# Patient Record
Sex: FEMALE | Race: ASIAN | NOT HISPANIC OR LATINO | URBAN - METROPOLITAN AREA
[De-identification: names, ages, dates, MRNs, and addresses within clinical notes are randomized per-mention and may not be internally consistent; named-entity substitution may affect disease eponyms.]

---

## 2019-05-02 ENCOUNTER — INPATIENT (INPATIENT)
Facility: HOSPITAL | Age: 69
LOS: 1 days | Discharge: HOME | End: 2019-05-04
Attending: INTERNAL MEDICINE | Admitting: INTERNAL MEDICINE
Payer: MEDICAID

## 2019-05-02 VITALS
OXYGEN SATURATION: 98 % | RESPIRATION RATE: 18 BRPM | HEART RATE: 94 BPM | DIASTOLIC BLOOD PRESSURE: 84 MMHG | SYSTOLIC BLOOD PRESSURE: 163 MMHG | TEMPERATURE: 98 F

## 2019-05-02 DIAGNOSIS — I10 ESSENTIAL (PRIMARY) HYPERTENSION: ICD-10-CM

## 2019-05-02 DIAGNOSIS — E11.9 TYPE 2 DIABETES MELLITUS WITHOUT COMPLICATIONS: ICD-10-CM

## 2019-05-02 DIAGNOSIS — R11.2 NAUSEA WITH VOMITING, UNSPECIFIED: ICD-10-CM

## 2019-05-02 DIAGNOSIS — E87.2 ACIDOSIS: ICD-10-CM

## 2019-05-02 DIAGNOSIS — Z29.9 ENCOUNTER FOR PROPHYLACTIC MEASURES, UNSPECIFIED: ICD-10-CM

## 2019-05-02 DIAGNOSIS — K76.9 LIVER DISEASE, UNSPECIFIED: ICD-10-CM

## 2019-05-02 DIAGNOSIS — R74.0 NONSPECIFIC ELEVATION OF LEVELS OF TRANSAMINASE AND LACTIC ACID DEHYDROGENASE [LDH]: ICD-10-CM

## 2019-05-02 DIAGNOSIS — R10.9 UNSPECIFIED ABDOMINAL PAIN: ICD-10-CM

## 2019-05-02 LAB
ALBUMIN SERPL ELPH-MCNC: 4.5 G/DL — SIGNIFICANT CHANGE UP (ref 3.3–5)
ALP SERPL-CCNC: 50 U/L — SIGNIFICANT CHANGE UP (ref 40–120)
ALT FLD-CCNC: 42 U/L — HIGH (ref 4–33)
ANION GAP SERPL CALC-SCNC: 15 MMO/L — HIGH (ref 7–14)
APPEARANCE UR: CLEAR — SIGNIFICANT CHANGE UP
AST SERPL-CCNC: 51 U/L — HIGH (ref 4–32)
BASE EXCESS BLDV CALC-SCNC: 3.7 MMOL/L — SIGNIFICANT CHANGE UP
BASE EXCESS BLDV CALC-SCNC: 7.6 MMOL/L — SIGNIFICANT CHANGE UP
BASOPHILS # BLD AUTO: 0.05 K/UL — SIGNIFICANT CHANGE UP (ref 0–0.2)
BASOPHILS NFR BLD AUTO: 0.5 % — SIGNIFICANT CHANGE UP (ref 0–2)
BILIRUB SERPL-MCNC: 0.6 MG/DL — SIGNIFICANT CHANGE UP (ref 0.2–1.2)
BILIRUB UR-MCNC: NEGATIVE — SIGNIFICANT CHANGE UP
BLOOD GAS VENOUS - CREATININE: 0.45 MG/DL — LOW (ref 0.5–1.3)
BLOOD GAS VENOUS - CREATININE: 0.49 MG/DL — LOW (ref 0.5–1.3)
BLOOD GAS VENOUS - FIO2: 21 — SIGNIFICANT CHANGE UP
BLOOD GAS VENOUS - FIO2: 21 — SIGNIFICANT CHANGE UP
BLOOD UR QL VISUAL: NEGATIVE — SIGNIFICANT CHANGE UP
BUN SERPL-MCNC: 11 MG/DL — SIGNIFICANT CHANGE UP (ref 7–23)
CALCIUM SERPL-MCNC: 9.9 MG/DL — SIGNIFICANT CHANGE UP (ref 8.4–10.5)
CHLORIDE BLDV-SCNC: 102 MMOL/L — SIGNIFICANT CHANGE UP (ref 96–108)
CHLORIDE BLDV-SCNC: 103 MMOL/L — SIGNIFICANT CHANGE UP (ref 96–108)
CHLORIDE SERPL-SCNC: 100 MMOL/L — SIGNIFICANT CHANGE UP (ref 98–107)
CO2 SERPL-SCNC: 26 MMOL/L — SIGNIFICANT CHANGE UP (ref 22–31)
COLOR SPEC: SIGNIFICANT CHANGE UP
CREAT SERPL-MCNC: 0.53 MG/DL — SIGNIFICANT CHANGE UP (ref 0.5–1.3)
EOSINOPHIL # BLD AUTO: 0.1 K/UL — SIGNIFICANT CHANGE UP (ref 0–0.5)
EOSINOPHIL NFR BLD AUTO: 0.9 % — SIGNIFICANT CHANGE UP (ref 0–6)
GAS PNL BLDV: 134 MMOL/L — LOW (ref 136–146)
GAS PNL BLDV: 138 MMOL/L — SIGNIFICANT CHANGE UP (ref 136–146)
GLUCOSE BLDV-MCNC: 135 MG/DL — HIGH (ref 70–99)
GLUCOSE BLDV-MCNC: 99 MG/DL — SIGNIFICANT CHANGE UP (ref 70–99)
GLUCOSE SERPL-MCNC: 143 MG/DL — HIGH (ref 70–99)
GLUCOSE UR-MCNC: NEGATIVE — SIGNIFICANT CHANGE UP
HCO3 BLDV-SCNC: 27 MMOL/L — SIGNIFICANT CHANGE UP (ref 20–27)
HCO3 BLDV-SCNC: 29 MMOL/L — HIGH (ref 20–27)
HCT VFR BLD CALC: 39 % — SIGNIFICANT CHANGE UP (ref 34.5–45)
HCT VFR BLDV CALC: 37.8 % — SIGNIFICANT CHANGE UP (ref 34.5–45)
HCT VFR BLDV CALC: 38.6 % — SIGNIFICANT CHANGE UP (ref 34.5–45)
HGB BLD-MCNC: 12.4 G/DL — SIGNIFICANT CHANGE UP (ref 11.5–15.5)
HGB BLDV-MCNC: 12.3 G/DL — SIGNIFICANT CHANGE UP (ref 11.5–15.5)
HGB BLDV-MCNC: 12.5 G/DL — SIGNIFICANT CHANGE UP (ref 11.5–15.5)
IMM GRANULOCYTES NFR BLD AUTO: 0.4 % — SIGNIFICANT CHANGE UP (ref 0–1.5)
KETONES UR-MCNC: NEGATIVE — SIGNIFICANT CHANGE UP
LACTATE BLDV-MCNC: 3.7 MMOL/L — HIGH (ref 0.5–2)
LACTATE BLDV-MCNC: 5.5 MMOL/L — CRITICAL HIGH (ref 0.5–2)
LEUKOCYTE ESTERASE UR-ACNC: NEGATIVE — SIGNIFICANT CHANGE UP
LIDOCAIN IGE QN: 98.9 U/L — HIGH (ref 7–60)
LYMPHOCYTES # BLD AUTO: 2.49 K/UL — SIGNIFICANT CHANGE UP (ref 1–3.3)
LYMPHOCYTES # BLD AUTO: 23.3 % — SIGNIFICANT CHANGE UP (ref 13–44)
MAGNESIUM SERPL-MCNC: 1.5 MG/DL — LOW (ref 1.6–2.6)
MCHC RBC-ENTMCNC: 24.7 PG — LOW (ref 27–34)
MCHC RBC-ENTMCNC: 31.8 % — LOW (ref 32–36)
MCV RBC AUTO: 77.5 FL — LOW (ref 80–100)
MONOCYTES # BLD AUTO: 0.58 K/UL — SIGNIFICANT CHANGE UP (ref 0–0.9)
MONOCYTES NFR BLD AUTO: 5.4 % — SIGNIFICANT CHANGE UP (ref 2–14)
NEUTROPHILS # BLD AUTO: 7.44 K/UL — HIGH (ref 1.8–7.4)
NEUTROPHILS NFR BLD AUTO: 69.5 % — SIGNIFICANT CHANGE UP (ref 43–77)
NITRITE UR-MCNC: NEGATIVE — SIGNIFICANT CHANGE UP
NRBC # FLD: 0.02 K/UL — SIGNIFICANT CHANGE UP (ref 0–0)
PCO2 BLDV: 45 MMHG — SIGNIFICANT CHANGE UP (ref 41–51)
PCO2 BLDV: 55 MMHG — HIGH (ref 41–51)
PH BLDV: 7.39 PH — SIGNIFICANT CHANGE UP (ref 7.32–7.43)
PH BLDV: 7.41 PH — SIGNIFICANT CHANGE UP (ref 7.32–7.43)
PH UR: 7 — SIGNIFICANT CHANGE UP (ref 5–8)
PLATELET # BLD AUTO: 275 K/UL — SIGNIFICANT CHANGE UP (ref 150–400)
PMV BLD: 10.2 FL — SIGNIFICANT CHANGE UP (ref 7–13)
PO2 BLDV: 31 MMHG — LOW (ref 35–40)
PO2 BLDV: 90 MMHG — HIGH (ref 35–40)
POTASSIUM BLDV-SCNC: 4.6 MMOL/L — HIGH (ref 3.4–4.5)
POTASSIUM BLDV-SCNC: 5.9 MMOL/L — HIGH (ref 3.4–4.5)
POTASSIUM SERPL-MCNC: 4.2 MMOL/L — SIGNIFICANT CHANGE UP (ref 3.5–5.3)
POTASSIUM SERPL-SCNC: 4.2 MMOL/L — SIGNIFICANT CHANGE UP (ref 3.5–5.3)
PROT SERPL-MCNC: 7.2 G/DL — SIGNIFICANT CHANGE UP (ref 6–8.3)
PROT UR-MCNC: NEGATIVE — SIGNIFICANT CHANGE UP
RBC # BLD: 5.03 M/UL — SIGNIFICANT CHANGE UP (ref 3.8–5.2)
RBC # FLD: 15.1 % — HIGH (ref 10.3–14.5)
SAO2 % BLDV: 52.9 % — LOW (ref 60–85)
SAO2 % BLDV: 96.9 % — HIGH (ref 60–85)
SODIUM SERPL-SCNC: 141 MMOL/L — SIGNIFICANT CHANGE UP (ref 135–145)
SP GR SPEC: 1.01 — SIGNIFICANT CHANGE UP (ref 1–1.04)
TROPONIN T, HIGH SENSITIVITY: < 6 NG/L — SIGNIFICANT CHANGE UP (ref ?–14)
UROBILINOGEN FLD QL: NORMAL — SIGNIFICANT CHANGE UP
WBC # BLD: 10.7 K/UL — HIGH (ref 3.8–10.5)
WBC # FLD AUTO: 10.7 K/UL — HIGH (ref 3.8–10.5)

## 2019-05-02 PROCEDURE — 99223 1ST HOSP IP/OBS HIGH 75: CPT | Mod: GC

## 2019-05-02 PROCEDURE — 74178 CT ABD&PLV WO CNTR FLWD CNTR: CPT | Mod: 26

## 2019-05-02 PROCEDURE — 71045 X-RAY EXAM CHEST 1 VIEW: CPT | Mod: 26

## 2019-05-02 RX ORDER — GLUCAGON INJECTION, SOLUTION 0.5 MG/.1ML
1 INJECTION, SOLUTION SUBCUTANEOUS ONCE
Qty: 0 | Refills: 0 | Status: DISCONTINUED | OUTPATIENT
Start: 2019-05-02 | End: 2019-05-04

## 2019-05-02 RX ORDER — INSULIN LISPRO 100/ML
VIAL (ML) SUBCUTANEOUS
Qty: 0 | Refills: 0 | Status: DISCONTINUED | OUTPATIENT
Start: 2019-05-02 | End: 2019-05-04

## 2019-05-02 RX ORDER — METOPROLOL TARTRATE 50 MG
50 TABLET ORAL THREE TIMES A DAY
Qty: 0 | Refills: 0 | Status: DISCONTINUED | OUTPATIENT
Start: 2019-05-02 | End: 2019-05-04

## 2019-05-02 RX ORDER — MAGNESIUM SULFATE 500 MG/ML
1 VIAL (ML) INJECTION ONCE
Qty: 0 | Refills: 0 | Status: COMPLETED | OUTPATIENT
Start: 2019-05-02 | End: 2019-05-02

## 2019-05-02 RX ORDER — DEXTROSE 50 % IN WATER 50 %
25 SYRINGE (ML) INTRAVENOUS ONCE
Qty: 0 | Refills: 0 | Status: DISCONTINUED | OUTPATIENT
Start: 2019-05-02 | End: 2019-05-04

## 2019-05-02 RX ORDER — SODIUM CHLORIDE 9 MG/ML
1000 INJECTION, SOLUTION INTRAVENOUS
Qty: 0 | Refills: 0 | Status: DISCONTINUED | OUTPATIENT
Start: 2019-05-02 | End: 2019-05-04

## 2019-05-02 RX ORDER — ASPIRIN/CALCIUM CARB/MAGNESIUM 324 MG
81 TABLET ORAL DAILY
Qty: 0 | Refills: 0 | Status: DISCONTINUED | OUTPATIENT
Start: 2019-05-02 | End: 2019-05-04

## 2019-05-02 RX ORDER — FAMOTIDINE 10 MG/ML
20 INJECTION INTRAVENOUS ONCE
Qty: 0 | Refills: 0 | Status: COMPLETED | OUTPATIENT
Start: 2019-05-02 | End: 2019-05-02

## 2019-05-02 RX ORDER — DEXTROSE 50 % IN WATER 50 %
12.5 SYRINGE (ML) INTRAVENOUS ONCE
Qty: 0 | Refills: 0 | Status: DISCONTINUED | OUTPATIENT
Start: 2019-05-02 | End: 2019-05-04

## 2019-05-02 RX ORDER — INSULIN LISPRO 100/ML
VIAL (ML) SUBCUTANEOUS AT BEDTIME
Qty: 0 | Refills: 0 | Status: DISCONTINUED | OUTPATIENT
Start: 2019-05-02 | End: 2019-05-04

## 2019-05-02 RX ORDER — SODIUM CHLORIDE 9 MG/ML
1000 INJECTION, SOLUTION INTRAVENOUS ONCE
Qty: 0 | Refills: 0 | Status: COMPLETED | OUTPATIENT
Start: 2019-05-02 | End: 2019-05-02

## 2019-05-02 RX ORDER — METOPROLOL TARTRATE 50 MG
50 TABLET ORAL THREE TIMES A DAY
Qty: 0 | Refills: 0 | Status: DISCONTINUED | OUTPATIENT
Start: 2019-05-02 | End: 2019-05-02

## 2019-05-02 RX ORDER — FAMOTIDINE 10 MG/ML
40 INJECTION INTRAVENOUS AT BEDTIME
Qty: 0 | Refills: 0 | Status: DISCONTINUED | OUTPATIENT
Start: 2019-05-02 | End: 2019-05-04

## 2019-05-02 RX ORDER — METOCLOPRAMIDE HCL 10 MG
5 TABLET ORAL THREE TIMES A DAY
Qty: 0 | Refills: 0 | Status: DISCONTINUED | OUTPATIENT
Start: 2019-05-02 | End: 2019-05-02

## 2019-05-02 RX ORDER — DEXTROSE 50 % IN WATER 50 %
15 SYRINGE (ML) INTRAVENOUS ONCE
Qty: 0 | Refills: 0 | Status: DISCONTINUED | OUTPATIENT
Start: 2019-05-02 | End: 2019-05-04

## 2019-05-02 RX ORDER — DILTIAZEM HCL 120 MG
30 CAPSULE, EXT RELEASE 24 HR ORAL DAILY
Qty: 0 | Refills: 0 | Status: DISCONTINUED | OUTPATIENT
Start: 2019-05-03 | End: 2019-05-04

## 2019-05-02 RX ORDER — LIDOCAINE 4 G/100G
10 CREAM TOPICAL ONCE
Qty: 0 | Refills: 0 | Status: COMPLETED | OUTPATIENT
Start: 2019-05-02 | End: 2019-05-02

## 2019-05-02 RX ORDER — SIMVASTATIN 20 MG/1
40 TABLET, FILM COATED ORAL AT BEDTIME
Qty: 0 | Refills: 0 | Status: DISCONTINUED | OUTPATIENT
Start: 2019-05-02 | End: 2019-05-04

## 2019-05-02 RX ORDER — SODIUM CHLORIDE 9 MG/ML
1000 INJECTION INTRAMUSCULAR; INTRAVENOUS; SUBCUTANEOUS
Qty: 0 | Refills: 0 | Status: DISCONTINUED | OUTPATIENT
Start: 2019-05-02 | End: 2019-05-04

## 2019-05-02 RX ORDER — PANTOPRAZOLE SODIUM 20 MG/1
40 TABLET, DELAYED RELEASE ORAL
Qty: 0 | Refills: 0 | Status: DISCONTINUED | OUTPATIENT
Start: 2019-05-02 | End: 2019-05-04

## 2019-05-02 RX ADMIN — SODIUM CHLORIDE 1000 MILLILITER(S): 9 INJECTION, SOLUTION INTRAVENOUS at 15:39

## 2019-05-02 RX ADMIN — Medication 30 MILLILITER(S): at 13:54

## 2019-05-02 RX ADMIN — SODIUM CHLORIDE 1000 MILLILITER(S): 9 INJECTION, SOLUTION INTRAVENOUS at 13:54

## 2019-05-02 RX ADMIN — SODIUM CHLORIDE 100 MILLILITER(S): 9 INJECTION INTRAMUSCULAR; INTRAVENOUS; SUBCUTANEOUS at 22:37

## 2019-05-02 RX ADMIN — Medication 100 GRAM(S): at 23:54

## 2019-05-02 RX ADMIN — LIDOCAINE 10 MILLILITER(S): 4 CREAM TOPICAL at 14:08

## 2019-05-02 RX ADMIN — FAMOTIDINE 40 MILLIGRAM(S): 10 INJECTION INTRAVENOUS at 22:28

## 2019-05-02 RX ADMIN — FAMOTIDINE 20 MILLIGRAM(S): 10 INJECTION INTRAVENOUS at 13:54

## 2019-05-02 NOTE — H&P ADULT - HISTORY OF PRESENT ILLNESS
69 yo F, accompanied by son, w/ PMH of DM, HTN, GERD, HLD, presenting from home w/ chief complaint of abdominal pain, nausea, vomiting, decreased appetite, decreased PO intake x 8 days. Patient was apparently in normal state of health until ~10 days ago, when she had mechanical trip and fall. Went to OSH for assessment after fall, and although sustained no injuries, found to incidentally have UTI and prescribed nitrofurantoin. Two days later, began experiencing symptoms of nausea, vomiting, decreased appetite, and abdominal pain, which is worse with meals. Patient also endorses intermittent chest pain. Denies other complaints. No HA, no further falls.  Has been dealing with chronic abd pain x 12 years, but the assoc sx are new.  Has seen a GI in past and had a colonoscopy 1 yr ago. 69 yo F, accompanied by son, w/ PMH of DM, HTN, GERD, HLD, presenting from home w/ acute on chronic abn pain. Reports worsening diffuse abdominal pain, nausea, NBNB vomiting, (unable to keep food down) decreased appetite, decreased PO intake x 8 days. Patient was apparently in normal state of health until ~10 days ago, when she had fall. Reported feeling dizzy before fall. Went to OSH for assessment after fall, BP was elevated, sustained no injuries, found to incidentally have UTI and prescribed nitrofurantoin. Two days after fall, began experiencing symptoms of nausea, vomiting, decreased appetite, and abdominal pain. Started nitrofurantoin today (several days after symptoms started). No fever, dizziness, CP, SOB, diarrhea, constipation, dysphagia, odynophagia, weight loss. No recent travel or sick contacts.     Has been dealing with abn pain for past 12 years per son. Was previously on am med that helped with pain but son not sure why it was stopped. Never had colonoscopy. Had EGD 3/21/18 with mild esophagitis, gastritis, gastric nodule at fundus which was biopsied. Son believes she has allergy to nausea med (unsure which one). Reaction was hives, no anaphlyaxis, SOB, tongue swelling. Patient has meloxicam listed on meds but not taking it currently. Patient also took her evening home meds (enalapril, diabetic meds). 67 yo F, accompanied by son, w/ PMH of DM, HTN, GERD, HLD, presenting from home w/ acute on chronic abn pain. Reports worsening diffuse abdominal pain, nausea, NBNB vomiting, (unable to keep food down) decreased appetite, decreased PO intake x 8 days. Patient was apparently in normal state of health until ~10 days ago, when she had fall. Reported feeling dizzy before fall. Went to OSH for assessment after fall, BP was elevated, sustained no injuries, found to incidentally have UTI and prescribed nitrofurantoin. Two days after fall, began experiencing symptoms of nausea, vomiting, decreased appetite, and abdominal pain. Started nitrofurantoin today (several days after symptoms started). No fever, dizziness, CP, SOB, diarrhea, constipation, dysphagia, odynophagia, weight loss. No recent travel or sick contacts.     Has been dealing with abn pain for past 12 years per son. Was previously on am med that helped with pain but son not sure why it was stopped. Never had colonoscopy. Had EGD 3/21/18 with mild esophagitis, gastritis, gastric nodule at fundus which was biopsied. Son believes she has allergy to nausea med (unsure which one). Reaction was hives, no anaphlyaxis, SOB, tongue swelling. Patient has meloxicam listed on meds but not taking it currently. Patient also took her evening home meds (enalapril, diabetic meds).

## 2019-05-02 NOTE — H&P ADULT - PROBLEM SELECTOR PLAN 5
-SSI with FS qac and hs   -c/w statin -SSI with FS qac and hs   -c/w statin  -check a1c -c/w home meds: metoprolol, enalapril, cardizem

## 2019-05-02 NOTE — ED ADULT NURSE NOTE - OBJECTIVE STATEMENT
break coverage RN: 68yof A&ox4 amb primarily Italian speaking, requesting son at the bedside to translate c/o generalized abdominal pain w/ n/v for a "long time." pt son reports this has been ongoing issue, no new/worsening symptoms today, reports going to several other hospitals for similar issue w/ negative workup. pt reports going to GI dr but was told she was "too weak" at the time for endoscopy. pt reports she vomits only after she eats. pt also c/o chronic leg pain. no trauma/injury reported by patient. no obvious deformity noted. pt breathing even/.unlabored. abdomen soft, nontender, nondistended. skin warm, dry, and intact. 18g iv lock placed to R ac. labs sent. medicated per orders. will continue to monitor.

## 2019-05-02 NOTE — ED ADULT TRIAGE NOTE - CHIEF COMPLAINT QUOTE
As per son, for 1 week patient has not felt like eating and has been vomiting with c/o abdominal pain.

## 2019-05-02 NOTE — H&P ADULT - NSHPREVIEWOFSYSTEMS_GEN_ALL_CORE
REVIEW OF SYSTEMS:    CONSTITUTIONAL: No weakness, fevers or chills  EYES/ENT: No visual changes;  no throat pain   NECK: No pain or stiffness  RESPIRATORY: No cough, wheezing, hemoptysis; No shortness of breath  CARDIOVASCULAR: (+) CP  GASTROINTESTINAL: (+)abn pain, N/V. No constipation. No melena or hematochezia.  GENITOURINARY: No dysuria, change in frequency or hematuria  NEUROLOGICAL: No numbness or weakness  SKIN: No itching, burning, rashes, or lesions   All other review of systems is negative unless indicated above. REVIEW OF SYSTEMS:    CONSTITUTIONAL: No weakness, fevers or chills  EYES/ENT: No visual changes;  no throat pain   NECK: No pain or stiffness  RESPIRATORY: No cough, wheezing, hemoptysis; No shortness of breath  CARDIOVASCULAR: no CP, palpitations  GASTROINTESTINAL: (+)abn pain, N/V. No constipation. No melena or hematochezia.  GENITOURINARY: No dysuria, change in frequency or hematuria  NEUROLOGICAL: No numbness or weakness  SKIN: No itching, burning, rashes, or lesions   All other review of systems is negative unless indicated above. Constitutional Symptoms: No weakness, fevers, chills, weight loss  Eyes: No visual changes, headache, eye pain, double vision  Ears, Nose, Mouth, Throat: No runny nose, sinus pain, ear pain, tinnitus, sore throat, dysphagia, odynophagia  Cardiovascular: No chest pain, palpitations, edema  Respiratory: No cough, wheezing, hemoptysis, shortness of breath  Gastrointestinal: +abdominal pain, nausea/vomiting, no diarrhea/constipation, hematemesis, BRBPR, melena  Genitourinary: No dysuria, frequency, hematuria  Musculoskeletal: No joint pain, joint swelling, decreased ROM  Skin: No pruritus, rashes, lesions, wounds  Neurologic:  No seizures, headache, paraesthesia, numbness, limb weakness    Positives and pertinent negatives noted and all other systems negative.

## 2019-05-02 NOTE — ED PROVIDER NOTE - ATTENDING CONTRIBUTION TO CARE
Attending Attestation: Dr. Swift  I have personally performed a history and physical examination of the patient and discussed management with the resident as well as the patient.  I reviewed the resident's note and agree with the documented findings and plan of care.  I have authored and modified critical sections of the Provider Note, including but not limited to HPI, Physical Exam and MDM. 67 yo F w/ PMH of HTN, DM, presenting w/ 8 days of abdominal pain worse with meals, n/v, decreased PO intake/appetite, as well as intermittent chest pain. Denies other complaints. Abdomen diffusely tender on exam, worst in the periumbilical area. Will obtain ekg, troponin, cxr, CTA ab/pelv, cbc, cmp, ua and culture, lipase, vbg. Treat pain, nausea, provide fluids. Reassess.

## 2019-05-02 NOTE — H&P ADULT - ASSESSMENT
67 yo F, accompanied by son, w/ PMH of DM, HTN, GERD, HLD, presenting from home w/ chief complaint of abdominal pain, nausea, vomiting, decreased appetite, decreased PO intake x 8 days. 67 yo F, accompanied by son, w/ PMH of DM, HTN, GERD, HLD, presenting from home w/ chief complaint of abdominal pain, nausea, vomiting, decreased appetite, decreased PO intake x 8 days, a/f diffuse abn pain w/u and dec PO intake, possibly 2/2 gastroparesis, vs gastric ulcer in setting of meloxicam use 69 yo F, accompanied by son, w/ PMH of DM, HTN, GERD, HLD, presenting from home w/ chief complaint of abdominal pain, nausea, vomiting, decreased appetite, decreased PO intake x 8 days, a/f diffuse abn pain w/u and dec PO intake, possibly 2/2 gastritis/GERD, gastroparesis:

## 2019-05-02 NOTE — H&P ADULT - PROBLEM SELECTOR PLAN 1
labs and imaging not c/w pancreatitis, mesenteric ischemia. no acute pathology on CT A/P   -will c/w zofran prn for nausea and advance diet as tolerated  -check RUQ given transaminitis  -c/w famotidine for GERD labs and imaging not c/w pancreatitis, mesenteric ischemia. no acute pathology on CT A/P   -will c/w zofran prn for nausea, monitor for and advance diet as tolerated  -check RUQ given transaminitis, low suspicion for cholecystitis given nl gallbladder on CT A/P  -c/w famotidine for GERD, start PPI  -trial of reglan for gastroparesis  -if improved and tolerating diet, can f/u with outpt GI  -advised to avoid NSAID given ulcer risk and can cause abn pain  -low suspicion for ACS given trop <6 and EKG NSR with no ischemic changes -labs and imaging not c/w pancreatitis, mesenteric ischemia. no acute pathology on CT A/P   -will c/w zofran prn for nausea, monitor for and advance diet as tolerated  -check RUQ given transaminitis, low suspicion for cholecystitis given nl gallbladder on CT A/P  -c/w famotidine for GERD, start PPI  -trial of reglan for gastroparesis if needed, but will attempt gastric emptying study   -if improved and tolerating diet, can f/u with outpt GI  -advised to avoid NSAID given ulcer risk and can cause abn pain  -low suspicion for ACS given trop <6 and EKG NSR with no ischemic changes

## 2019-05-02 NOTE — ED PROVIDER NOTE - CLINICAL SUMMARY MEDICAL DECISION MAKING FREE TEXT BOX
69 yo F w/ PMH of HTN, DM, presenting w/ 8 days of abdominal pain worse with meals, n/v, decreased PO intake/appetite, as well as intermittent chest pain. Denies other complaints. Abdomen diffusely tender on exam, worst in the periumbilical area. Will obtain ekg, troponin, cxr, CTA ab/pelv, cbc, cmp, ua and culture, lipase, vbg. Treat pain, nausea, provide fluids. Reassess.

## 2019-05-02 NOTE — ED PROVIDER NOTE - CARE PLAN
Principal Discharge DX:	Nausea and vomiting  Secondary Diagnosis:	Abdominal pain  Secondary Diagnosis:	Elevated lactic acid level

## 2019-05-02 NOTE — H&P ADULT - NSHPPHYSICALEXAM_GEN_ALL_CORE
ICU Vital Signs Last 24 Hrs  T(C): 36.7 (05-02-19 @ 20:43), Max: 36.9 (05-02-19 @ 14:11)  T(F): 98.1 (05-02-19 @ 20:43), Max: 98.5 (05-02-19 @ 14:11)  HR: 98 (05-02-19 @ 20:43) (77 - 98)  BP: 156/85 (05-02-19 @ 20:43) (156/85 - 163/84)  BP(mean): --  ABP: --  ABP(mean): --  RR: 18 (05-02-19 @ 20:43) (16 - 18)  SpO2: 100% (05-02-19 @ 20:43) (98% - 100%)    GENERAL: NAD  HEENT: EOMI, MMM, no oropharyngeal lesions or erythema appreciated  Pulm: normal work of breathing, CTABL  CV: RRR, S1&S2+, no m/r/g appreciated  ABDOMEN: soft, nt, nd, no hepatosplenomegaly  MSK: nl ROM  EXTREMITIES:  no appreciable edema in b/l LE  Neuro: A&Ox3, no focal deficits  SKIN: warm and dry, no visible rash ICU Vital Signs Last 24 Hrs  T(C): 36.7 (05-02-19 @ 20:43), Max: 36.9 (05-02-19 @ 14:11)  T(F): 98.1 (05-02-19 @ 20:43), Max: 98.5 (05-02-19 @ 14:11)  HR: 98 (05-02-19 @ 20:43) (77 - 98)  BP: 156/85 (05-02-19 @ 20:43) (156/85 - 163/84)  BP(mean): --  ABP: --  ABP(mean): --  RR: 18 (05-02-19 @ 20:43) (16 - 18)  SpO2: 100% (05-02-19 @ 20:43) (98% - 100%)    GENERAL: NAD  HEENT: EOMI, MMM, no oropharyngeal lesions or erythema appreciated  Pulm: normal work of breathing, CTABL  CV: RRR, S1&S2+, no m/r/g appreciated  ABDOMEN: soft, mild epigastric tenderness, nd,   MSK: nl ROM  EXTREMITIES:  no appreciable edema in b/l LE  Neuro: A&Ox3, no focal deficits  SKIN: warm and dry, no visible rash ICU Vital Signs Last 24 Hrs  T(C): 36.7 (05-02-19 @ 20:43), Max: 36.9 (05-02-19 @ 14:11)  T(F): 98.1 (05-02-19 @ 20:43), Max: 98.5 (05-02-19 @ 14:11)  HR: 98 (05-02-19 @ 20:43) (77 - 98)  BP: 156/85 (05-02-19 @ 20:43) (156/85 - 163/84)  BP(mean): --  ABP: --  ABP(mean): --  RR: 18 (05-02-19 @ 20:43) (16 - 18)  SpO2: 100% (05-02-19 @ 20:43) (98% - 100%)    Constitutional: NAD, well-developed, well-nourished  Ears, Nose, Mouth, and Throat: normal external ears and nose, normal hearing, moist oral mucosa  Eyes: normal conjunctiva, EOMI, PERRL  Neck: supple, no JVD  Respiratory: Clear to auscultation bilaterally. No wheezes, rales or rhonchi. Normal respiratory effort  Cardiovascular: RRR, no M/R/G, no edema, 2+ Peripheral Pulses  Gastrointestinal: +mild epigastric tenderness, soft, nondistended, +BS  Skin: warm, dry, no rash  Neurologic: sensation grossly intact, CN grossly intact, non-focal exam  Musculoskeletal: no clubbing, no cyanosis, no joint swelling  Psychiatric: AOX3, appropriate mood, affect

## 2019-05-02 NOTE — H&P ADULT - NSHPLABSRESULTS_GEN_ALL_CORE
12.4   1070 )-----------( 275      ( 02 May 2019 13:43 )             39.0     05-    141  |  100  |  11  ----------------------------<  143<H>  4.2   |  26  |  0.53    Ca    9.9      02 May 2019 13:43  Mg     1.5     -    TPro  7.2  /  Alb  4.5  /  TBili  0.6  /  DBili  x   /  AST  51<H>  /  ALT  42<H>  /  AlkPhos  50  05-        Urinalysis Basic - ( 02 May 2019 14:30 )    Color: LIGHT YELLOW / Appearance: CLEAR / S.008 / pH: 7.0  Gluc: NEGATIVE / Ketone: NEGATIVE  / Bili: NEGATIVE / Urobili: NORMAL   Blood: NEGATIVE / Protein: NEGATIVE / Nitrite: NEGATIVE   Leuk Esterase: NEGATIVE / RBC: x / WBC x   Sq Epi: x / Non Sq Epi: x / Bacteria: x    CAPILLARY BLOOD GLUCOSE  POCT Blood Glucose.: 144 mg/dL (02 May 2019 12:33)    Cultures:  Radiology:  EKG: 12.4   10.70 )-----------( 275      ( 02 May 2019 13:43 )             39.0         141  |  100  |  11  ----------------------------<  143<H>  4.2   |  26  |  0.53    Ca    9.9      02 May 2019 13:43  Mg     1.5         TPro  7.2  /  Alb  4.5  /  TBili  0.6  /  DBili  x   /  AST  51<H>  /  ALT  42<H>  /  AlkPhos  50          Urinalysis Basic - ( 02 May 2019 14:30 )    Color: LIGHT YELLOW / Appearance: CLEAR / S.008 / pH: 7.0  Gluc: NEGATIVE / Ketone: NEGATIVE  / Bili: NEGATIVE / Urobili: NORMAL   Blood: NEGATIVE / Protein: NEGATIVE / Nitrite: NEGATIVE   Leuk Esterase: NEGATIVE / RBC: x / WBC x   Sq Epi: x / Non Sq Epi: x / Bacteria: x    CAPILLARY BLOOD GLUCOSE  POCT Blood Glucose.: 144 mg/dL (02 May 2019 12:33)    Cultures: UA wnl   Radiology: CXR wnl   < from: Xray Chest 1 View- PORTABLE-Urgent (19 @ 14:48) >    INTERPRETATION:  CLINICAL INDICATION: chest pain    EXAM:  Portable frontal chest from 2019 at 1448. No prior chest x-ray   available at this institution for comparison.    IMPRESSION:  Clear lungs. No pleural effusions or pneumothorax.    Cardiac and mediastinal silhouettes within normal limits.    Trachea midline.    Unremarkable osseous structures.    < end of copied text >    < from: CT Abdomen and Pelvis w/ Oral Cont and w/wo IV Cont (19 @ 17:58) >      FINDINGS:    LOWER CHEST: Minimal right basilar atelectasis.    LIVER: There is a 1.9 cm indeterminant hypodense lesion within the left   hepatic lobe.  BILE DUCTS: Normal caliber.  GALLBLADDER: Within normal limits.  SPLEEN: Within normal limits.  PANCREAS: Within normal limits.  ADRENALS: Within normal limits.  KIDNEYS/URETERS: Within normal limits.    BLADDER: Markedly distended.  REPRODUCTIVE ORGANS: Uterus and adnexa within normal limits.    BOWEL: No bowel obstruction. No evidence of bowel wall thickening or   inflammation. Appendix is normal.  PERITONEUM: No ascites.  VESSELS:  The abdominal aorta is normal in caliber with no evidence of   aneurysm. The celiac artery, SMA, renal arteries, and WAI are patent. The   common iliac and internal and external iliac arteries are patent and   normal in caliber. There is mild atherosclerosis of the abdominal aorta   and bilateral iliac arteries. The portal, splenic and the mesenteric   veins are patent.  RETROPERITONEUM: No lymphadenopathy.    ABDOMINAL WALL: Tiny fat-containing umbilical hernia.  BONES: Within normal limits.    IMPRESSION:     No evidence of mesenteric ischemia. Normal appearance of the bowel.  Indeterminate left hepatic lesion for which further evaluation with   contrast-enhanced MRI is advised on outpatient basis.    < end of copied text >      EKG: 12.4   10.70 )-----------( 275      ( 02 May 2019 13:43 )             39.0         141  |  100  |  11  ----------------------------<  143<H>  4.2   |  26  |  0.53    Ca    9.9      02 May 2019 13:43  Mg     1.5         TPro  7.2  /  Alb  4.5  /  TBili  0.6  /  DBili  x   /  AST  51<H>  /  ALT  42<H>  /  AlkPhos  50          Urinalysis Basic - ( 02 May 2019 14:30 )    Color: LIGHT YELLOW / Appearance: CLEAR / S.008 / pH: 7.0  Gluc: NEGATIVE / Ketone: NEGATIVE  / Bili: NEGATIVE / Urobili: NORMAL   Blood: NEGATIVE / Protein: NEGATIVE / Nitrite: NEGATIVE   Leuk Esterase: NEGATIVE / RBC: x / WBC x   Sq Epi: x / Non Sq Epi: x / Bacteria: x    CAPILLARY BLOOD GLUCOSE  POCT Blood Glucose.: 144 mg/dL (02 May 2019 12:33)    Cultures: UA wnl   Radiology: CXR wnl   < from: Xray Chest 1 View- PORTABLE-Urgent (19 @ 14:48) >    INTERPRETATION:  CLINICAL INDICATION: chest pain    EXAM:  Portable frontal chest from 2019 at 1448. No prior chest x-ray   available at this institution for comparison.    IMPRESSION:  Clear lungs. No pleural effusions or pneumothorax.    Cardiac and mediastinal silhouettes within normal limits.    Trachea midline.    Unremarkable osseous structures.    < end of copied text >    < from: CT Abdomen and Pelvis w/ Oral Cont and w/wo IV Cont (19 @ 17:58) >      FINDINGS:    LOWER CHEST: Minimal right basilar atelectasis.    LIVER: There is a 1.9 cm indeterminant hypodense lesion within the left   hepatic lobe.  BILE DUCTS: Normal caliber.  GALLBLADDER: Within normal limits.  SPLEEN: Within normal limits.  PANCREAS: Within normal limits.  ADRENALS: Within normal limits.  KIDNEYS/URETERS: Within normal limits.    BLADDER: Markedly distended.  REPRODUCTIVE ORGANS: Uterus and adnexa within normal limits.    BOWEL: No bowel obstruction. No evidence of bowel wall thickening or   inflammation. Appendix is normal.  PERITONEUM: No ascites.  VESSELS:  The abdominal aorta is normal in caliber with no evidence of   aneurysm. The celiac artery, SMA, renal arteries, and WAI are patent. The   common iliac and internal and external iliac arteries are patent and   normal in caliber. There is mild atherosclerosis of the abdominal aorta   and bilateral iliac arteries. The portal, splenic and the mesenteric   veins are patent.  RETROPERITONEUM: No lymphadenopathy.    ABDOMINAL WALL: Tiny fat-containing umbilical hernia.  BONES: Within normal limits.    IMPRESSION:     No evidence of mesenteric ischemia. Normal appearance of the bowel.  Indeterminate left hepatic lesion for which further evaluation with   contrast-enhanced MRI is advised on outpatient basis.    < end of copied text >      EKG: NSR, no ischemic changes, QTc 443 12.4   10.70 )-----------( 275      ( 02 May 2019 13:43 )             39.0         141  |  100  |  11  ----------------------------<  143<H>  4.2   |  26  |  0.53    Ca    9.9      02 May 2019 13:43  Mg     1.5         TPro  7.2  /  Alb  4.5  /  TBili  0.6  /  DBili  x   /  AST  51<H>  /  ALT  42<H>  /  AlkPhos  50          Urinalysis Basic - ( 02 May 2019 14:30 )    Color: LIGHT YELLOW / Appearance: CLEAR / S.008 / pH: 7.0  Gluc: NEGATIVE / Ketone: NEGATIVE  / Bili: NEGATIVE / Urobili: NORMAL   Blood: NEGATIVE / Protein: NEGATIVE / Nitrite: NEGATIVE   Leuk Esterase: NEGATIVE / RBC: x / WBC x   Sq Epi: x / Non Sq Epi: x / Bacteria: x    CAPILLARY BLOOD GLUCOSE  POCT Blood Glucose.: 144 mg/dL (02 May 2019 12:33)    Cultures: UA wnl   Radiology: CXR wnl   < from: Xray Chest 1 View- PORTABLE-Urgent (19 @ 14:48) >    INTERPRETATION:  CLINICAL INDICATION: chest pain    EXAM:  Portable frontal chest from 2019 at 1448. No prior chest x-ray   available at this institution for comparison.    IMPRESSION:  Clear lungs. No pleural effusions or pneumothorax.    Cardiac and mediastinal silhouettes within normal limits.    Trachea midline.    Unremarkable osseous structures.    < end of copied text >    < from: CT Abdomen and Pelvis w/ Oral Cont and w/wo IV Cont (19 @ 17:58) >      FINDINGS:    LOWER CHEST: Minimal right basilar atelectasis.    LIVER: There is a 1.9 cm indeterminant hypodense lesion within the left   hepatic lobe.  BILE DUCTS: Normal caliber.  GALLBLADDER: Within normal limits.  SPLEEN: Within normal limits.  PANCREAS: Within normal limits.  ADRENALS: Within normal limits.  KIDNEYS/URETERS: Within normal limits.    BLADDER: Markedly distended.  REPRODUCTIVE ORGANS: Uterus and adnexa within normal limits.    BOWEL: No bowel obstruction. No evidence of bowel wall thickening or   inflammation. Appendix is normal.  PERITONEUM: No ascites.  VESSELS:  The abdominal aorta is normal in caliber with no evidence of   aneurysm. The celiac artery, SMA, renal arteries, and WAI are patent. The   common iliac and internal and external iliac arteries are patent and   normal in caliber. There is mild atherosclerosis of the abdominal aorta   and bilateral iliac arteries. The portal, splenic and the mesenteric   veins are patent.  RETROPERITONEUM: No lymphadenopathy.    ABDOMINAL WALL: Tiny fat-containing umbilical hernia.  BONES: Within normal limits.    IMPRESSION:     No evidence of mesenteric ischemia. Normal appearance of the bowel.  Indeterminate left hepatic lesion for which further evaluation with   contrast-enhanced MRI is advised on outpatient basis.    < end of copied text >      EKG personally reviewed, NSR, no ischemic changes, QTc 443

## 2019-05-02 NOTE — H&P ADULT - PROBLEM SELECTOR PLAN 3
-also with inc AG, 2/2 dec PO intake, lactate trend down after IVF  -c/w IVF -also with inc AG, 2/2 dec PO intake, lactate trend down after IVF. vessels patent on CT A/P and doubt mesenteric ischemia   -c/w IVF -also with inc AG, 2/2 dec PO intake, on metformin, received contrast w/ CT, lactate trend down after IVF. vessels patent on CT A/P and doubt mesenteric ischemia   -c/w IVF

## 2019-05-02 NOTE — ED ADULT NURSE REASSESSMENT NOTE - NS ED NURSE REASSESS COMMENT FT1
Pt. is alert and oriented x 4, no c/o pain no nausea or vomiting. Admitted to hospital report given to [facility]

## 2019-05-02 NOTE — ED PROVIDER NOTE - OBJECTIVE STATEMENT
69 yo F, accompanied by son, w/ PMH of DM, HTN, GERD, HLD, presenting from home w/ chief complaint of abdominal pain, nausea, vomiting, decreased appetite, decreased PO intake x 8 days. Patient was apparently in normal state of health until ~10 days ago, when she had mechanical trip and fall. Went to OSH for assessment after fall, and although sustained no injuries, found to incidentally have UTI and prescribed nitrofurantoin. Two days later, began experiencing symptoms of nausea, vomiting, decreased appetite, and abdominal pain, which is worse with meals. Patient also endorses intermittent chest pain. Denies other complaints.     PMD: Dr. Kolby Hernandez  Card: Dr. Echevarria 69 yo F, accompanied by son, w/ PMH of DM, HTN, GERD, HLD, presenting from home w/ chief complaint of abdominal pain, nausea, vomiting, decreased appetite, decreased PO intake x 8 days. Patient was apparently in normal state of health until ~10 days ago, when she had mechanical trip and fall. Went to OSH for assessment after fall, and although sustained no injuries, found to incidentally have UTI and prescribed nitrofurantoin. Two days later, began experiencing symptoms of nausea, vomiting, decreased appetite, and abdominal pain, which is worse with meals. Patient also endorses intermittent chest pain. Denies other complaints. No HA, no further falls.  Has been dealing with chronic abd pain x 12 years, but the assoc sx are new.  Has seen a GI in past and had a colonoscopy 1 yr ago.    PMD: Dr. Kolby Hernandez  Card: Dr. Echevarria

## 2019-05-03 DIAGNOSIS — E11.9 TYPE 2 DIABETES MELLITUS WITHOUT COMPLICATIONS: ICD-10-CM

## 2019-05-03 DIAGNOSIS — R10.13 EPIGASTRIC PAIN: ICD-10-CM

## 2019-05-03 LAB
BACTERIA UR CULT: SIGNIFICANT CHANGE UP
HBA1C BLD-MCNC: 7.9 % — HIGH (ref 4–5.6)
HCV AB S/CO SERPL IA: 0.04 S/CO — SIGNIFICANT CHANGE UP (ref 0–0.99)
HCV AB SERPL-IMP: SIGNIFICANT CHANGE UP
SPECIMEN SOURCE: SIGNIFICANT CHANGE UP

## 2019-05-03 PROCEDURE — 99233 SBSQ HOSP IP/OBS HIGH 50: CPT

## 2019-05-03 PROCEDURE — 78264 GASTRIC EMPTYING IMG STUDY: CPT | Mod: 26

## 2019-05-03 RX ADMIN — Medication 10 MILLIGRAM(S): at 21:28

## 2019-05-03 RX ADMIN — FAMOTIDINE 40 MILLIGRAM(S): 10 INJECTION INTRAVENOUS at 21:28

## 2019-05-03 RX ADMIN — Medication 50 MILLIGRAM(S): at 21:28

## 2019-05-03 RX ADMIN — Medication 81 MILLIGRAM(S): at 18:07

## 2019-05-03 RX ADMIN — PANTOPRAZOLE SODIUM 40 MILLIGRAM(S): 20 TABLET, DELAYED RELEASE ORAL at 05:26

## 2019-05-03 RX ADMIN — Medication 30 MILLIGRAM(S): at 05:26

## 2019-05-03 RX ADMIN — SIMVASTATIN 40 MILLIGRAM(S): 20 TABLET, FILM COATED ORAL at 21:28

## 2019-05-03 RX ADMIN — Medication 50 MILLIGRAM(S): at 05:25

## 2019-05-03 NOTE — PROGRESS NOTE ADULT - PROBLEM SELECTOR PLAN 3
-also with inc AG, 2/2 dec PO intake, on metformin, received contrast w/ CT, lactate trend down after IVF. vessels patent on CT A/P and doubt mesenteric ischemia   -c/w IVF  -f/u repeat lactate today

## 2019-05-03 NOTE — PROGRESS NOTE ADULT - PROBLEM SELECTOR PLAN 1
-labs and imaging not c/w pancreatitis, mesenteric ischemia. no acute pathology on CT A/P   -will c/w zofran prn for nausea, monitor for and advance diet as tolerated  -check RUQ given transaminitis, low suspicion for cholecystitis given nl gallbladder on CT A/P  -c/w famotidine for GERD, start PPI  -trial of reglan for gastroparesis if needed, but will attempt gastric emptying study   -if improved and tolerating diet, can f/u with outpt GI  -advised to avoid NSAID given ulcer risk and can cause abn pain  -low suspicion for ACS given trop <6 and EKG NSR with no ischemic changes

## 2019-05-04 ENCOUNTER — TRANSCRIPTION ENCOUNTER (OUTPATIENT)
Age: 69
End: 2019-05-04

## 2019-05-04 VITALS
HEART RATE: 77 BPM | OXYGEN SATURATION: 100 % | SYSTOLIC BLOOD PRESSURE: 141 MMHG | DIASTOLIC BLOOD PRESSURE: 76 MMHG | TEMPERATURE: 98 F | RESPIRATION RATE: 17 BRPM

## 2019-05-04 LAB
ALBUMIN SERPL ELPH-MCNC: 4.2 G/DL — SIGNIFICANT CHANGE UP (ref 3.3–5)
ALP SERPL-CCNC: 47 U/L — SIGNIFICANT CHANGE UP (ref 40–120)
ALT FLD-CCNC: 35 U/L — HIGH (ref 4–33)
ANION GAP SERPL CALC-SCNC: 13 MMO/L — SIGNIFICANT CHANGE UP (ref 7–14)
AST SERPL-CCNC: 41 U/L — HIGH (ref 4–32)
BASOPHILS # BLD AUTO: 0.04 K/UL — SIGNIFICANT CHANGE UP (ref 0–0.2)
BASOPHILS NFR BLD AUTO: 0.5 % — SIGNIFICANT CHANGE UP (ref 0–2)
BILIRUB SERPL-MCNC: 0.8 MG/DL — SIGNIFICANT CHANGE UP (ref 0.2–1.2)
BUN SERPL-MCNC: 10 MG/DL — SIGNIFICANT CHANGE UP (ref 7–23)
CALCIUM SERPL-MCNC: 9.5 MG/DL — SIGNIFICANT CHANGE UP (ref 8.4–10.5)
CHLORIDE SERPL-SCNC: 104 MMOL/L — SIGNIFICANT CHANGE UP (ref 98–107)
CO2 SERPL-SCNC: 26 MMOL/L — SIGNIFICANT CHANGE UP (ref 22–31)
CREAT SERPL-MCNC: 0.54 MG/DL — SIGNIFICANT CHANGE UP (ref 0.5–1.3)
EOSINOPHIL # BLD AUTO: 0.22 K/UL — SIGNIFICANT CHANGE UP (ref 0–0.5)
EOSINOPHIL NFR BLD AUTO: 2.8 % — SIGNIFICANT CHANGE UP (ref 0–6)
GLUCOSE SERPL-MCNC: 173 MG/DL — HIGH (ref 70–99)
HCT VFR BLD CALC: 39.5 % — SIGNIFICANT CHANGE UP (ref 34.5–45)
HGB BLD-MCNC: 12.5 G/DL — SIGNIFICANT CHANGE UP (ref 11.5–15.5)
IMM GRANULOCYTES NFR BLD AUTO: 0.4 % — SIGNIFICANT CHANGE UP (ref 0–1.5)
LYMPHOCYTES # BLD AUTO: 2.56 K/UL — SIGNIFICANT CHANGE UP (ref 1–3.3)
LYMPHOCYTES # BLD AUTO: 32.9 % — SIGNIFICANT CHANGE UP (ref 13–44)
MAGNESIUM SERPL-MCNC: 1.7 MG/DL — SIGNIFICANT CHANGE UP (ref 1.6–2.6)
MCHC RBC-ENTMCNC: 25 PG — LOW (ref 27–34)
MCHC RBC-ENTMCNC: 31.6 % — LOW (ref 32–36)
MCV RBC AUTO: 78.8 FL — LOW (ref 80–100)
MONOCYTES # BLD AUTO: 0.51 K/UL — SIGNIFICANT CHANGE UP (ref 0–0.9)
MONOCYTES NFR BLD AUTO: 6.6 % — SIGNIFICANT CHANGE UP (ref 2–14)
NEUTROPHILS # BLD AUTO: 4.41 K/UL — SIGNIFICANT CHANGE UP (ref 1.8–7.4)
NEUTROPHILS NFR BLD AUTO: 56.8 % — SIGNIFICANT CHANGE UP (ref 43–77)
NRBC # FLD: 0 K/UL — SIGNIFICANT CHANGE UP (ref 0–0)
PHOSPHATE SERPL-MCNC: 3.5 MG/DL — SIGNIFICANT CHANGE UP (ref 2.5–4.5)
PLATELET # BLD AUTO: 284 K/UL — SIGNIFICANT CHANGE UP (ref 150–400)
PMV BLD: 10.7 FL — SIGNIFICANT CHANGE UP (ref 7–13)
POTASSIUM SERPL-MCNC: 4.1 MMOL/L — SIGNIFICANT CHANGE UP (ref 3.5–5.3)
POTASSIUM SERPL-SCNC: 4.1 MMOL/L — SIGNIFICANT CHANGE UP (ref 3.5–5.3)
PROT SERPL-MCNC: 6.7 G/DL — SIGNIFICANT CHANGE UP (ref 6–8.3)
RBC # BLD: 5.01 M/UL — SIGNIFICANT CHANGE UP (ref 3.8–5.2)
RBC # FLD: 15 % — HIGH (ref 10.3–14.5)
SODIUM SERPL-SCNC: 143 MMOL/L — SIGNIFICANT CHANGE UP (ref 135–145)
WBC # BLD: 7.77 K/UL — SIGNIFICANT CHANGE UP (ref 3.8–10.5)
WBC # FLD AUTO: 7.77 K/UL — SIGNIFICANT CHANGE UP (ref 3.8–10.5)

## 2019-05-04 PROCEDURE — 99239 HOSP IP/OBS DSCHRG MGMT >30: CPT

## 2019-05-04 RX ORDER — MELOXICAM 15 MG/1
1 TABLET ORAL
Qty: 0 | Refills: 0 | COMMUNITY

## 2019-05-04 RX ORDER — METFORMIN HYDROCHLORIDE 850 MG/1
1 TABLET ORAL
Qty: 0 | Refills: 0 | COMMUNITY

## 2019-05-04 RX ORDER — DILTIAZEM HCL 120 MG
1 CAPSULE, EXT RELEASE 24 HR ORAL
Qty: 0 | Refills: 0 | COMMUNITY

## 2019-05-04 RX ORDER — METOPROLOL TARTRATE 50 MG
1 TABLET ORAL
Qty: 0 | Refills: 0 | COMMUNITY

## 2019-05-04 RX ORDER — METOPROLOL TARTRATE 50 MG
3 TABLET ORAL
Qty: 90 | Refills: 0
Start: 2019-05-04

## 2019-05-04 RX ORDER — ACARBOSE 25 MG/1
1 TABLET ORAL
Qty: 0 | Refills: 0 | COMMUNITY

## 2019-05-04 RX ORDER — AMLODIPINE BESYLATE 2.5 MG/1
5 TABLET ORAL DAILY
Qty: 0 | Refills: 0 | Status: DISCONTINUED | OUTPATIENT
Start: 2019-05-04 | End: 2019-05-04

## 2019-05-04 RX ORDER — FENOFIBRATE,MICRONIZED 130 MG
1 CAPSULE ORAL
Qty: 0 | Refills: 0 | COMMUNITY

## 2019-05-04 RX ORDER — METFORMIN HYDROCHLORIDE 850 MG/1
1 TABLET ORAL
Qty: 60 | Refills: 0
Start: 2019-05-04

## 2019-05-04 RX ORDER — FERROUS SULFATE 325(65) MG
1 TABLET ORAL
Qty: 0 | Refills: 0 | COMMUNITY

## 2019-05-04 RX ORDER — AMLODIPINE BESYLATE 2.5 MG/1
1 TABLET ORAL
Qty: 30 | Refills: 0
Start: 2019-05-04

## 2019-05-04 RX ORDER — PANTOPRAZOLE SODIUM 20 MG/1
1 TABLET, DELAYED RELEASE ORAL
Qty: 30 | Refills: 0
Start: 2019-05-04

## 2019-05-04 RX ADMIN — Medication 3: at 12:42

## 2019-05-04 RX ADMIN — Medication 81 MILLIGRAM(S): at 11:13

## 2019-05-04 RX ADMIN — Medication 50 MILLIGRAM(S): at 06:35

## 2019-05-04 RX ADMIN — Medication 50 MILLIGRAM(S): at 13:34

## 2019-05-04 RX ADMIN — Medication 30 MILLIGRAM(S): at 06:35

## 2019-05-04 RX ADMIN — AMLODIPINE BESYLATE 5 MILLIGRAM(S): 2.5 TABLET ORAL at 11:13

## 2019-05-04 RX ADMIN — PANTOPRAZOLE SODIUM 40 MILLIGRAM(S): 20 TABLET, DELAYED RELEASE ORAL at 06:35

## 2019-05-04 RX ADMIN — Medication 2: at 17:32

## 2019-05-04 RX ADMIN — Medication 2: at 08:58

## 2019-05-04 NOTE — DISCHARGE NOTE PROVIDER - PROVIDER TOKENS
FREE:[LAST:[Mray],FIRST:[],PHONE:[(   )    -],FAX:[(   )    -],ADDRESS:[PCP]] FREE:[LAST:[Mary],FIRST:[],PHONE:[(   )    -],FAX:[(   )    -],ADDRESS:[PCP]],FREE:[LAST:[[facility]],PHONE:[phone]],FAX:[(   )    -]]

## 2019-05-04 NOTE — DISCHARGE NOTE NURSING/CASE MANAGEMENT/SOCIAL WORK - NSDCFUADDAPPT_GEN_ALL_CORE_FT
Follow-up as outpatient for MRI for liver lesion  Follow-up with gastroenterology as outpatient for colonoscopy within 1 month

## 2019-05-04 NOTE — PROGRESS NOTE ADULT - ASSESSMENT
67 yo F, accompanied by son, w/ PMH of DM, HTN, GERD, HLD, presenting from home w/ chief complaint of abdominal pain, nausea, vomiting, decreased appetite, decreased PO intake x 8 days, a/f diffuse abn pain w/u and dec PO intake, possibly 2/2 gastritis/GERD.
69 yo F, accompanied by son, w/ PMH of DM, HTN, GERD, HLD, presenting from home w/ chief complaint of abdominal pain, nausea, vomiting, decreased appetite, decreased PO intake x 8 days, a/f diffuse abn pain w/u and dec PO intake, possibly 2/2 gastritis/GERD.     Problem/Plan - 1:  ·  Problem: Epigastric pain.  Plan:  - suspect medication induced - patient has been taking meloxicam (NSAID), nitrofurantoin, ferrous sulfate  - avoid NSAIDs, nitrofurantoin stopped as urine culture negative  - advised to stop ferrous sulfate for 1 month as hemoglobin is normal, and this medication can cause N/V, abdominal discomfort  - advance to solid food today - if can tolerate, can be discharged  - needs outpatient MRI liver, outpatient colonoscopy - refer to [facility]    Problem/Plan - 2:  ·  Problem: Transaminitis.  Plan: -Mild  - 1.9 cm indeterminate lesions on CT - contrast-enhanced MRI is advised on outpatient basis    Problem/Plan - 3:  ·  Problem: Acidosis, lactic.  Plan: -also with inc AG, 2/2 dec PO intake, on metformin, received contrast w/ CT, lactate trend down after IVF    Problem/Plan - 4:  ·  Problem: Diabetes mellitus type 2, noninsulin dependent.  Plan: -SSI with FS qac and hs   - c/w statin  - A1c 7.9  - continue glipizide and Januvia on discharge  - change metformin to 1000 mg BID    Problem/Plan - 5:  ·  Problem: Essential hypertension  - BP above goal  - stop diltiazem, start amlodipine 5 mg daily today - prescribe on DC  - continue losartan 100 mg daily  - change metoprolol from 50 mg TID to Toprol  mg daily      Problem/Plan - 7:  ·  Problem: Need for prophylactic measure.  Plan: -improve score 1, no need for DVT ppx.     If able to tolerate solid food without vomiting, medically stable for DC home today  - outpatient f/u with PCP for management of diabetes, hypertension  - needs outpatient liver MRI as noted above  - refer to [facility] for outpatient colonoscopy    Planned explained to patient with son and  at bedside    50 min discharge time

## 2019-05-04 NOTE — DISCHARGE NOTE PROVIDER - CARE PROVIDER_API CALL
Dr. Mary  PCP  Phone: (   )    -  Fax: (   )    -  Follow Up Time: Dr. Mary  PCP  Phone: (   )    -  Fax: (   )    -  Follow Up Time:     [facility],   Phone: [phone]  Fax: (   )    -  Follow Up Time:

## 2019-05-04 NOTE — DISCHARGE NOTE NURSING/CASE MANAGEMENT/SOCIAL WORK - NSDCDPATPORTLINK_GEN_ALL_CORE
You can access the [facility] Patient Portal, offered by [facility], by registering with the following website: [email or web address]

## 2019-05-04 NOTE — DISCHARGE NOTE PROVIDER - NSDCCPCAREPLAN_GEN_ALL_CORE_FT
PRINCIPAL DISCHARGE DIAGNOSIS  Diagnosis: Nausea and vomiting  Assessment and Plan of Treatment: Improved and you may advance your diet as tolerated.  Gastric emptying study reveals ______ ***  Continue your medications as directed and please follow-up as an outpatient with your primary care provider for further care and recommendations.      SECONDARY DISCHARGE DIAGNOSES  Diagnosis: Elevated lactic acid level  Assessment and Plan of Treatment: Improved. Continue with oral hydration.  Metformin ______ **    Diagnosis: Abdominal pain  Assessment and Plan of Treatment: If persistent pain and not able to tolerate an oral diet return to the emergency room. No disease noted on CT scan PRINCIPAL DISCHARGE DIAGNOSIS  Diagnosis: Nausea and vomiting  Assessment and Plan of Treatment: Improved and you may advance your diet as tolerated.  Gastric emptying study did not reveal cause.  Continue your medications as directed and please follow-up as an outpatient with your primary care provider for further care and recommendations.  Call a gastroenterologist with [facility] according to your insurance.      SECONDARY DISCHARGE DIAGNOSES  Diagnosis: Elevated lactic acid level  Assessment and Plan of Treatment: Improved. Continue with oral hydration.    Diagnosis: Abdominal pain  Assessment and Plan of Treatment: If persistent pain and not able to tolerate an oral diet return to the emergency room. No disease noted on CT scan  Avoid medications like Meloxicam, Motrin, Advil and take Tylenol as needed for pain

## 2019-05-04 NOTE — DISCHARGE NOTE PROVIDER - NSDCFUADDAPPT_GEN_ALL_CORE_FT
Follow-up as outpatient for MRI for liver lesion Follow-up as outpatient for MRI for liver lesion  Follow-up with gastroenterology as outpatient for colonoscopy within 1 month

## 2019-05-04 NOTE — PROGRESS NOTE ADULT - SUBJECTIVE AND OBJECTIVE BOX
CHIEF COMPLAINT: Patient is a 68y old  female who presents with a chief complaint of abn pain, N/V (02 May 2019 20:44)      SUBJECTIVE / OVERNIGHT EVENTS:    Abdominal pain improved today. Mild central abdominal pain. Denies N/V. Reports being hungry.    MEDICATIONS  (STANDING):  aspirin  chewable 81 milliGRAM(s) Oral daily  dextrose 5%. 1000 milliLiter(s) (50 mL/Hr) IV Continuous <Continuous>  dextrose 50% Injectable 12.5 Gram(s) IV Push once  dextrose 50% Injectable 25 Gram(s) IV Push once  dextrose 50% Injectable 25 Gram(s) IV Push once  diltiazem    Tablet 30 milliGRAM(s) Oral daily  enalapril 10 milliGRAM(s) Oral at bedtime  famotidine    Tablet 40 milliGRAM(s) Oral at bedtime  insulin lispro (HumaLOG) corrective regimen sliding scale   SubCutaneous three times a day before meals  insulin lispro (HumaLOG) corrective regimen sliding scale   SubCutaneous at bedtime  metoprolol tartrate 50 milliGRAM(s) Oral three times a day  pantoprazole    Tablet 40 milliGRAM(s) Oral before breakfast  simvastatin 40 milliGRAM(s) Oral at bedtime  sodium chloride 0.9%. 1000 milliLiter(s) (100 mL/Hr) IV Continuous <Continuous>    MEDICATIONS  (PRN):  dextrose 40% Gel 15 Gram(s) Oral once PRN Blood Glucose LESS THAN 70 milliGRAM(s)/deciliter  glucagon  Injectable 1 milliGRAM(s) IntraMuscular once PRN Glucose LESS THAN 70 milligrams/deciliter      VITALS:  T(F): 97.7 (19 @ 05:23), Max: 98.5 (19 @ 14:11)  HR: 85 (19 @ 05:23) (77 - 98)  BP: 149/76 (19 @ 05:23) (148/65 - 163/84)  RR: 19 (19 @ 05:23) (16 - 19)  SpO2: 99% (19 @ 05:23)  Height (cm): 154.94 (04:36)  Weight (kg): 52.3 (04:36)  BMI (kg/m2): 21.8 (04:36)    CAPILLARY BLOOD GLUCOSE    Output   Height (cm): 154.94 (04:36)  Weight (kg): 52.3 (04:36)  BMI (kg/m2): 21.8 (04:36)  I&O's Summary  T(F): 97.7 (19 @ 05:23), Max: 98.5 (19 @ 14:11)  HR: 85 (19 @ 05:23) (77 - 98)  BP: 149/76 (19 @ 05:23) (148/65 - 163/84)  RR: 19 (19 @ 05:23) (16 - 19)  SpO2: 99% (19 @ 05:23)    PHYSICAL EXAM:  GENERAL: NAD, well-developed  HEAD:  Atraumatic, Normocephalic  EYES: EOMI  NECK: Supple, No JVD  CHEST/LUNG: nonlabored breathing  HEART: nl S1/S2  ABDOMEN: nondistended, soft, minimal tenderness to palpation in mid-abdomen  EXTREMITIES:  no LE edema  PSYCH: alert, answering questions appropriately  NEUROLOGY: non-focal  SKIN: No rashes noted    LABS:              12.4                 141  | 26   | 11           10.70 >-----------< 275     ------------------------< 143                   39.0                 4.2  | 100  | 0.53                                         Ca 9.9   Mg 1.5   Ph x           TPro  7.2  /  Alb  4.5      TBili  0.6  /  DBili  x         AST  51  /  ALT  42            AlkPhos  50            Urinalysis Basic - ( 02 May 2019 14:30 )    Color: LIGHT YELLOW / Appearance: CLEAR / S.008 / pH: 7.0  Gluc: NEGATIVE / Ketone: NEGATIVE  / Bili: NEGATIVE / Urobili: NORMAL   Blood: NEGATIVE / Protein: NEGATIVE / Nitrite: NEGATIVE   Leuk Esterase: NEGATIVE / RBC: x / WBC x   Sq Epi: x / Non Sq Epi: x / Bacteria: x        VB-02 @ 18:06  7.39/55/31/29/52.9/7.6    VB-02 @ 13:43  7.41/45/90/27/96.9/3.7        MICROBIOLOGY:    Culture - Urine (collected 02 May 2019 15:01)  Source: URINE MIDSTREAM  Preliminary Report (03 May 2019 08:21):    NO GROWTH TO DATE          RADIOLOGY & ADDITIONAL TESTS:    Imaging Personally Reviewed:    < from: CT Abdomen and Pelvis w/ Oral Cont and w/wo IV Cont (19 @ 17:58) >  IMPRESSION:     No evidence of mesenteric ischemia. Normal appearance of the bowel.  Indeterminate left hepatic lesion for which further evaluation with   contrast-enhanced MRI is advised on outpatient basis.    < end of copied text >        [x] Care Discussed with Consultants/Other Providers:      Alton Van M.D.  Hospitalist  Pager [phone]
CHIEF COMPLAINT: Patient is a 68y old  female who presents with a chief complaint of abn pain, N/V (04 May 2019 11:17)      SUBJECTIVE / OVERNIGHT EVENTS:    Abdominal pain improved. Denies N/V - tolerating full liquids. Discussed will advance diet to full.    Per son, patient had colonoscopy last 10 years ago. Advised patient to repeat colonoscopy after DC.    Discussed nonspecific liver lesion with son, patient, . Discussed need for outpatient liver MRI. Patient in agreement.    Patient was taking nitrofurantoin, ferrous sulfate tablets. Advised to stop taking these, because they can cause nausea.    MEDICATIONS  (STANDING):  amLODIPine   Tablet 5 milliGRAM(s) Oral daily  aspirin  chewable 81 milliGRAM(s) Oral daily  dextrose 5%. 1000 milliLiter(s) (50 mL/Hr) IV Continuous <Continuous>  dextrose 50% Injectable 12.5 Gram(s) IV Push once  dextrose 50% Injectable 25 Gram(s) IV Push once  dextrose 50% Injectable 25 Gram(s) IV Push once  enalapril 10 milliGRAM(s) Oral at bedtime  famotidine    Tablet 40 milliGRAM(s) Oral at bedtime  insulin lispro (HumaLOG) corrective regimen sliding scale   SubCutaneous three times a day before meals  insulin lispro (HumaLOG) corrective regimen sliding scale   SubCutaneous at bedtime  metoprolol tartrate 50 milliGRAM(s) Oral three times a day  pantoprazole    Tablet 40 milliGRAM(s) Oral before breakfast  simvastatin 40 milliGRAM(s) Oral at bedtime  sodium chloride 0.9%. 1000 milliLiter(s) (100 mL/Hr) IV Continuous <Continuous>    MEDICATIONS  (PRN):  dextrose 40% Gel 15 Gram(s) Oral once PRN Blood Glucose LESS THAN 70 milliGRAM(s)/deciliter  glucagon  Injectable 1 milliGRAM(s) IntraMuscular once PRN Glucose LESS THAN 70 milligrams/deciliter      VITALS:  T(F): 97.8 (19 @ 06:32), Max: 98 (19 @ 21:23)  HR: 63 (19 @ 11:12) (63 - 82)  BP: 155/87 (19 @ 11:12) (146/80 - 174/84)  RR: 16 (19 @ 06:32) (16 - 18)  SpO2: 100% (19 @ 06:32)      CAPILLARY BLOOD GLUCOSE    Output     I&O's Summary  T(F): 97.8 (19 @ 06:32), Max: 98 (19 @ 21:23)  HR: 63 (19 @ 11:12) (63 - 82)  BP: 155/87 (19 @ 11:12) (146/80 - 174/84)  RR: 16 (19 @ 06:32) (16 - 18)  SpO2: 100% (19 @ 06:32)    PHYSICAL EXAM:  GENERAL: NAD, well-developed  HEAD:  Atraumatic, Normocephalic  EYES: EOMI  NECK: Supple, No JVD  CHEST/LUNG: nonlabored breathing  HEART: nl S1/S2  ABDOMEN: nondistended, soft, no RUQ tenderness, minimal tenderness to palpation in mid-lower abdomen  EXTREMITIES:  no LE edema  PSYCH: alert, answering questions appropriately  NEUROLOGY: non-focal  SKIN: No rashes noted    LABS:              12.5                 143  | 26   | 10           7.77  >-----------< 284     ------------------------< 173                   39.5                 4.1  | 104  | 0.54                                         Ca 9.5   Mg 1.7   Ph 3.5         TPro  6.7  /  Alb  4.2      TBili  0.8  /  DBili  x         AST  41  /  ALT  35            AlkPhos  47            Urinalysis Basic - ( 02 May 2019 14:30 )    Color: LIGHT YELLOW / Appearance: CLEAR / S.008 / pH: 7.0  Gluc: NEGATIVE / Ketone: NEGATIVE  / Bili: NEGATIVE / Urobili: NORMAL   Blood: NEGATIVE / Protein: NEGATIVE / Nitrite: NEGATIVE   Leuk Esterase: NEGATIVE / RBC: x / WBC x   Sq Epi: x / Non Sq Epi: x / Bacteria: x        VB-02 @ 18:06  7.39/55/31/29/52.9/7.6    VB-02 @ 13:43  7.41/45/90/27/96.9/3.7        MICROBIOLOGY:    Culture - Urine (collected 02 May 2019 15:01)  Source: URINE MIDSTREAM  Final Report (03 May 2019 14:58):    NO GROWTH AT 24 HOURS          RADIOLOGY & ADDITIONAL TESTS:    Imaging Personally Reviewed:      < from: CT Abdomen and Pelvis w/ Oral Cont and w/wo IV Cont (19 @ 17:58) >      < end of copied text >      [x] Care Discussed with Consultants/Other Providers:      Alton Van M.D.  Hospitalist  Pager [phone]

## 2019-05-04 NOTE — DISCHARGE NOTE PROVIDER - HOSPITAL COURSE
68 F PMH of DM, HTN, GERD, HLD p/w acute on chronic abdominal pain, nausea, NBNB vomiting, decreased appetite, decreased PO intake x 8 days possibly 2/2 gastritis/GERD, gastroparesis. Of note, pt had fall ~10 days ago, reports dizziness prior; went to OSH and noted with elevated BP and incidentally found with UTI and prescribed Nitrofurantoin.         Epigastric pain: CT A/P w/o acute pathology; Lipase mildly elevated; EKG w/o ischemic changes, HST <6; Zofran PRN; Famotidine, PPI; Avoid NSAIDs; UA negative, CXR negative; Gastric empty study performed         Transaminitis: Mild; Monitor levels        Elevated lactate: Likely multifactorial in setting of decreased PO intake, Metformin, contrast with CT scan; Downtrending w/ IVF; CT A/P with patent vessels, unlikely mesenteric ischemia         Diabetes mellitus type 2: On Metformin at home being held while hospitalized (Note patient with elevated lactate and transaminitis); A1c 7.9%; Sugars stable on ISS        Essential hypertension: BB, Enalapril, Cardizem         Hyperlipidemia: Statin        Liver lesion: 1.9 cm indeterminant hypodense L hepatic lesion noted on CT A/P; F/U outpatient MRI needed.        Dispo - Home         EXAM:  CT ABDOMEN AND PELVIS [facility]      PROCEDURE DATE:  May  2 2019         FINDINGS:    LOWER CHEST: Minimal right basilar atelectasis.    LIVER: There is a 1.9 cm indeterminant hypodense lesion within the left hepatic lobe.    BILE DUCTS: Normal caliber.    GALLBLADDER: Within normal limits.    SPLEEN: Within normal limits.    PANCREAS: Within normal limits.    ADRENALS: Within normal limits.    KIDNEYS/URETERS: Within normal limits.    BLADDER: Markedly distended.    REPRODUCTIVE ORGANS: Uterus and adnexa within normal limits.    BOWEL: No bowel obstruction. No evidence of bowel wall thickening or inflammation. Appendix is normal.    PERITONEUM: No ascites.    VESSELS:  The abdominal aorta is normal in caliber with no evidence of aneurysm. The celiac artery, SMA, renal arteries, and WAI are patent. The common iliac and internal and external iliac arteries are patent and normal in caliber. There is mild atherosclerosis of the abdominal aorta and bilateral iliac arteries. The portal, splenic and the mesenteric veins are patent.    RETROPERITONEUM: No lymphadenopathy.      ABDOMINAL WALL: Tiny fat-containing umbilical hernia.    BONES: Within normal limits.        IMPRESSION: No evidence of mesenteric ischemia. Normal appearance of the bowel. Indeterminate left hepatic lesion for which further evaluation with contrast-enhanced MRI is advised on outpatient basis.

## 2019-08-12 ENCOUNTER — EMERGENCY (EMERGENCY)
Facility: HOSPITAL | Age: 69
LOS: 1 days | Discharge: HOME | End: 2019-08-12
Attending: EMERGENCY MEDICINE | Admitting: EMERGENCY MEDICINE
Payer: MEDICAID

## 2019-08-12 VITALS
HEART RATE: 78 BPM | SYSTOLIC BLOOD PRESSURE: 154 MMHG | DIASTOLIC BLOOD PRESSURE: 77 MMHG | TEMPERATURE: 98 F | OXYGEN SATURATION: 99 % | RESPIRATION RATE: 18 BRPM

## 2019-08-12 VITALS
DIASTOLIC BLOOD PRESSURE: 68 MMHG | HEART RATE: 83 BPM | SYSTOLIC BLOOD PRESSURE: 130 MMHG | TEMPERATURE: 98 F | OXYGEN SATURATION: 98 % | RESPIRATION RATE: 16 BRPM

## 2019-08-12 PROBLEM — I10 ESSENTIAL (PRIMARY) HYPERTENSION: Chronic | Status: ACTIVE | Noted: 2019-05-02

## 2019-08-12 PROBLEM — E11.9 TYPE 2 DIABETES MELLITUS WITHOUT COMPLICATIONS: Chronic | Status: ACTIVE | Noted: 2019-05-02

## 2019-08-12 LAB
ALBUMIN SERPL ELPH-MCNC: 4.4 G/DL — SIGNIFICANT CHANGE UP (ref 3.3–5)
ALP SERPL-CCNC: 59 U/L — SIGNIFICANT CHANGE UP (ref 40–120)
ALT FLD-CCNC: 36 U/L — HIGH (ref 4–33)
ANION GAP SERPL CALC-SCNC: 19 MMO/L — HIGH (ref 7–14)
AST SERPL-CCNC: 56 U/L — HIGH (ref 4–32)
BASE EXCESS BLDV CALC-SCNC: 3.7 MMOL/L — SIGNIFICANT CHANGE UP
BASE EXCESS BLDV CALC-SCNC: 5.5 MMOL/L — SIGNIFICANT CHANGE UP
BASOPHILS # BLD AUTO: 0.06 K/UL — SIGNIFICANT CHANGE UP (ref 0–0.2)
BASOPHILS NFR BLD AUTO: 0.6 % — SIGNIFICANT CHANGE UP (ref 0–2)
BILIRUB SERPL-MCNC: 0.5 MG/DL — SIGNIFICANT CHANGE UP (ref 0.2–1.2)
BLOOD GAS VENOUS - CREATININE: 0.67 MG/DL — SIGNIFICANT CHANGE UP (ref 0.5–1.3)
BLOOD GAS VENOUS - CREATININE: 0.67 MG/DL — SIGNIFICANT CHANGE UP (ref 0.5–1.3)
BLOOD GAS VENOUS - FIO2: 21 — SIGNIFICANT CHANGE UP
BUN SERPL-MCNC: 14 MG/DL — SIGNIFICANT CHANGE UP (ref 7–23)
CALCIUM SERPL-MCNC: 10.7 MG/DL — HIGH (ref 8.4–10.5)
CHLORIDE BLDV-SCNC: 107 MMOL/L — SIGNIFICANT CHANGE UP (ref 96–108)
CHLORIDE BLDV-SCNC: 108 MMOL/L — SIGNIFICANT CHANGE UP (ref 96–108)
CHLORIDE SERPL-SCNC: 97 MMOL/L — LOW (ref 98–107)
CO2 SERPL-SCNC: 25 MMOL/L — SIGNIFICANT CHANGE UP (ref 22–31)
CREAT SERPL-MCNC: 0.67 MG/DL — SIGNIFICANT CHANGE UP (ref 0.5–1.3)
EOSINOPHIL # BLD AUTO: 0.24 K/UL — SIGNIFICANT CHANGE UP (ref 0–0.5)
EOSINOPHIL NFR BLD AUTO: 2.5 % — SIGNIFICANT CHANGE UP (ref 0–6)
GAS PNL BLDV: 136 MMOL/L — SIGNIFICANT CHANGE UP (ref 136–146)
GAS PNL BLDV: 136 MMOL/L — SIGNIFICANT CHANGE UP (ref 136–146)
GLUCOSE BLDV-MCNC: 116 MG/DL — HIGH (ref 70–99)
GLUCOSE BLDV-MCNC: 117 MG/DL — HIGH (ref 70–99)
GLUCOSE SERPL-MCNC: 117 MG/DL — HIGH (ref 70–99)
HCO3 BLDV-SCNC: 26 MMOL/L — SIGNIFICANT CHANGE UP (ref 20–27)
HCO3 BLDV-SCNC: 28 MMOL/L — HIGH (ref 20–27)
HCT VFR BLD CALC: 38.1 % — SIGNIFICANT CHANGE UP (ref 34.5–45)
HCT VFR BLDV CALC: 35.3 % — SIGNIFICANT CHANGE UP (ref 34.5–45)
HCT VFR BLDV CALC: 40.2 % — SIGNIFICANT CHANGE UP (ref 34.5–45)
HGB BLD-MCNC: 12.2 G/DL — SIGNIFICANT CHANGE UP (ref 11.5–15.5)
HGB BLDV-MCNC: 11.4 G/DL — LOW (ref 11.5–15.5)
HGB BLDV-MCNC: 13.1 G/DL — SIGNIFICANT CHANGE UP (ref 11.5–15.5)
IMM GRANULOCYTES NFR BLD AUTO: 0.3 % — SIGNIFICANT CHANGE UP (ref 0–1.5)
LACTATE BLDV-MCNC: 3.7 MMOL/L — HIGH (ref 0.5–2)
LACTATE BLDV-MCNC: 4.8 MMOL/L — CRITICAL HIGH (ref 0.5–2)
LIDOCAIN IGE QN: 70.4 U/L — HIGH (ref 7–60)
LYMPHOCYTES # BLD AUTO: 2.67 K/UL — SIGNIFICANT CHANGE UP (ref 1–3.3)
LYMPHOCYTES # BLD AUTO: 28.2 % — SIGNIFICANT CHANGE UP (ref 13–44)
MCHC RBC-ENTMCNC: 24.8 PG — LOW (ref 27–34)
MCHC RBC-ENTMCNC: 32 % — SIGNIFICANT CHANGE UP (ref 32–36)
MCV RBC AUTO: 77.4 FL — LOW (ref 80–100)
MONOCYTES # BLD AUTO: 0.71 K/UL — SIGNIFICANT CHANGE UP (ref 0–0.9)
MONOCYTES NFR BLD AUTO: 7.5 % — SIGNIFICANT CHANGE UP (ref 2–14)
NEUTROPHILS # BLD AUTO: 5.76 K/UL — SIGNIFICANT CHANGE UP (ref 1.8–7.4)
NEUTROPHILS NFR BLD AUTO: 60.9 % — SIGNIFICANT CHANGE UP (ref 43–77)
NRBC # FLD: 0 K/UL — SIGNIFICANT CHANGE UP (ref 0–0)
PCO2 BLDV: 50 MMHG — SIGNIFICANT CHANGE UP (ref 41–51)
PCO2 BLDV: 53 MMHG — HIGH (ref 41–51)
PH BLDV: 7.36 PH — SIGNIFICANT CHANGE UP (ref 7.32–7.43)
PH BLDV: 7.4 PH — SIGNIFICANT CHANGE UP (ref 7.32–7.43)
PLATELET # BLD AUTO: 374 K/UL — SIGNIFICANT CHANGE UP (ref 150–400)
PMV BLD: 10.1 FL — SIGNIFICANT CHANGE UP (ref 7–13)
PO2 BLDV: 30 MMHG — LOW (ref 35–40)
PO2 BLDV: 41 MMHG — HIGH (ref 35–40)
POTASSIUM BLDV-SCNC: 3.8 MMOL/L — SIGNIFICANT CHANGE UP (ref 3.4–4.5)
POTASSIUM BLDV-SCNC: 3.8 MMOL/L — SIGNIFICANT CHANGE UP (ref 3.4–4.5)
POTASSIUM SERPL-MCNC: 4.2 MMOL/L — SIGNIFICANT CHANGE UP (ref 3.5–5.3)
POTASSIUM SERPL-SCNC: 4.2 MMOL/L — SIGNIFICANT CHANGE UP (ref 3.5–5.3)
PROT SERPL-MCNC: 7.5 G/DL — SIGNIFICANT CHANGE UP (ref 6–8.3)
RBC # BLD: 4.92 M/UL — SIGNIFICANT CHANGE UP (ref 3.8–5.2)
RBC # FLD: 13.9 % — SIGNIFICANT CHANGE UP (ref 10.3–14.5)
SAO2 % BLDV: 47.2 % — LOW (ref 60–85)
SAO2 % BLDV: 69.3 % — SIGNIFICANT CHANGE UP (ref 60–85)
SODIUM SERPL-SCNC: 141 MMOL/L — SIGNIFICANT CHANGE UP (ref 135–145)
TROPONIN T, HIGH SENSITIVITY: 10 NG/L — SIGNIFICANT CHANGE UP (ref ?–14)
TROPONIN T, HIGH SENSITIVITY: < 6 NG/L — SIGNIFICANT CHANGE UP (ref ?–14)
WBC # BLD: 9.47 K/UL — SIGNIFICANT CHANGE UP (ref 3.8–10.5)
WBC # FLD AUTO: 9.47 K/UL — SIGNIFICANT CHANGE UP (ref 3.8–10.5)

## 2019-08-12 PROCEDURE — 99284 EMERGENCY DEPT VISIT MOD MDM: CPT

## 2019-08-12 PROCEDURE — 71046 X-RAY EXAM CHEST 2 VIEWS: CPT | Mod: 26

## 2019-08-12 RX ORDER — SODIUM CHLORIDE 9 MG/ML
1000 INJECTION INTRAMUSCULAR; INTRAVENOUS; SUBCUTANEOUS ONCE
Refills: 0 | Status: COMPLETED | OUTPATIENT
Start: 2019-08-12 | End: 2019-08-12

## 2019-08-12 RX ADMIN — SODIUM CHLORIDE 1000 MILLILITER(S): 9 INJECTION INTRAMUSCULAR; INTRAVENOUS; SUBCUTANEOUS at 17:22

## 2019-08-12 NOTE — ED ADULT NURSE NOTE - CHIEF COMPLAINT QUOTE
Pt c/o cough and CP/abd pain with high blood pressure and elevated blood glucose x15 days.  Saw MD 1 week ago and was given phenergan and an antibiotic with no relief.  Went to [facility] 8/3/19.  denies fever

## 2019-08-12 NOTE — ED PROVIDER NOTE - CARE PLAN
Principal Discharge DX:	Cough  Goal:	Please follow up with your primary care doctor after discharge.

## 2019-08-12 NOTE — ED ADULT NURSE REASSESSMENT NOTE - NS ED NURSE REASSESS COMMENT FT1
pt back from xray. resting comfortably. respirations even and unlabored. repeat lactate drawn and sent. will monitor.

## 2019-08-12 NOTE — ED PROVIDER NOTE - PHYSICAL EXAMINATION
GENERAL: no acute distress, non-toxic appearing  HEAD: normocephalic, atraumatic  HEENT: normal conjunctiva, oral mucosa moist, neck supple without lymphadenopathy, no oropharyngeal erythema  CARDIAC: regular rate and rhythm, normal S1 and S2,  no appreciable murmurs  PULM: clear to ascultation bilaterally, no crackles, rales, rhonchi, or wheezing appreciated  GI: abdomen distended at baseline,     soft, nontender, no guarding or rebound tenderness  NEURO: alert and oriented x 3, normal speech, PERRLA, EOMI, no focal motor or sensory deficits, nonantalgic gait  MSK: no visible deformities, no peripheral edema, calf tenderness/redness/swelling  SKIN: no visible rashes, dry, well-perfused  PSYCH: appropriate mood and affect GENERAL: no acute distress, non-toxic appearing  HEAD: normocephalic, atraumatic  HEENT: normal conjunctiva, oral mucosa moist, neck supple without lymphadenopathy, no oropharyngeal erythema  CARDIAC: regular rate and rhythm, normal S1 and S2,  no appreciable murmurs  PULM: clear to ascultation bilaterally, no crackles, rales, rhonchi, or wheezing appreciated  GI: abdomen distended at baseline, soft, nontender, no guarding or rebound tenderness  NEURO: alert and oriented x 3, normal speech, EOMI, 5/5 strength in all four extremities, nonantalgic gait  MSK: no visible deformities, no peripheral edema, calf tenderness/redness/swelling  SKIN: no visible rashes, dry, well-perfused  PSYCH: appropriate mood and affect

## 2019-08-12 NOTE — ED ADULT NURSE NOTE - OBJECTIVE STATEMENT
pt received spot 7. pt A+Ox3, Amharic speaking, translation done by daughter at bedside as per pt request. pt c/o productive cough x 2 weeks. was treated with course of antibiotics with no improvement. also c/o lower abd pain. respirations even and unlabored. abd soft nondistended. denies fever/chills/n/v. labs sent IVSl in place.  awaiting MD mcneil. will monitor.

## 2019-08-12 NOTE — ED PROVIDER NOTE - OBJECTIVE STATEMENT
Patient is a 69 year old female with PMH of HLD, HTN, type 2 DM presenting to the ED with a cough that began 2 weeks ago. She states that the cough is productive of white mucus and anterior wall/sternal chest pain only when she coughs. She also states she's been coughing so much that her abdomen feels sore. Patient went to doctor 1 week ago and received a 10 day course of ciprofloxacin for which she is currently on the 7th day as well as promethizine which she states helps her cough a little. She states that she's had something like this before 2 years ago but this is worse than that. Patient does endorse seeing a cardiologist a year ago for some exertional chest pain, however she notes that this chest pain she is experiencing is not the same in character and only occurs when she coughs. No recent travel, no sick contacts, never smoked. Denies fevers, chills, pleuritic chest pain, exertional chest pain, palpitations, lightheadedness, diaphoresis, nausea, SOB, n/v/d, abdominal distention, leg swelling.

## 2019-08-12 NOTE — ED PROVIDER NOTE - NS ED ROS FT
GENERAL: no fever, chills  HEENT: + cough productive of white sputum,      congestion, odynophagia, dysphagia  CARDIAC: no chest pain, palpitations, lightheadedness  PULM: no dyspnea, wheezing   GI: no abdominal pain, nausea, vomiting, diarrhea, constipation, melena, hematochezia  : no urinary dysuria, frequency, incontinence, hematuria  NEURO: no headache, motor weakness, sensory changes  MSK: no joint or muscle pain  SKIN: no rashes, hives  HEME: no active bleeding, bruising GENERAL: no fever, chills  HEENT: no congestion, odynophagia, dysphagia  CARDIAC: + chest pain only when she coughs, no palpitations, lightheadedness  PULM: no dyspnea, wheezing, or hemoptysis   GI: + abdominal soreness after coughing, no nausea, vomiting, diarrhea, constipation, melena, hematochezia  : no urinary dysuria, frequency, incontinence, hematuria  NEURO: no headache, motor weakness, sensory changes  MSK: no joint or muscle pain or leg swelling  SKIN: no rashes, hives  HEME: no active bleeding, bruising

## 2019-08-12 NOTE — ED ADULT TRIAGE NOTE - CHIEF COMPLAINT QUOTE
Pt c/o cough and CP/abd pain with high blood pressure and elevated blood glucose x15 days.  Saw MD 1 week ago and was given phenergan and an antibiotic with no relief.  Went to [facility] 8/3/19.  denies fever

## 2019-08-12 NOTE — ED ADULT NURSE NOTE - NSIMPLEMENTINTERV_GEN_ALL_ED
Implemented All Universal Safety Interventions:  [place] to call system. Call bell, personal items and telephone within reach. Instruct patient to call for assistance. Room bathroom lighting operational. Non-slip footwear when patient is off stretcher. Physically safe environment: no spills, clutter or unnecessary equipment. Stretcher in lowest position, wheels locked, appropriate side rails in place.

## 2019-08-12 NOTE — ED PROVIDER NOTE - PROGRESS NOTE DETAILS
CIANO- labs showing elevated lactate but down trending on repeat after ivf. clinically well appearing and not septic and no abd pain. was admitted in past and had w/u for elevated lactate which also dowtrended and was d/c-ed with lactate >2

## 2019-08-12 NOTE — ED ADULT NURSE REASSESSMENT NOTE - NS ED NURSE REASSESS COMMENT FT1
report recd from RN Yumiko patient resting in stretcher placed on cardiac monitor reading NSR, states pain has resolved, resps even and unlabored will ctm

## 2019-08-12 NOTE — ED PROVIDER NOTE - NSFOLLOWUPINSTRUCTIONS_ED_ALL_ED_FT
Please follow up with your primary care doctor within a week. Please ask about possibly changing your enalapril medication, as this can possibly be the cause of your cough. Please also follow up regarding your elevated lactate, as this has been seen on prior hospital visits, and metformin medication can also cause this lab abnormality.     Please return to the emergency room if worsening symptoms, such as shortness of breath, fevers or chills.

## 2019-08-12 NOTE — ED PROVIDER NOTE - ATTENDING CONTRIBUTION TO CARE
68yo F with h/o HTN on ACEI, DM, HLD, p/w 1-2 weeks cough with phlegm and chest pain only with coughing, not exertional, no sob or rhinnorhea  or sore throat, no fevers. no sick contacts or recent travel outside [place].  Was placed on Cipro now on day 7/10 ?for cough, but no improvement so came to ED. No abd pain or n/v/d.     General: Patient in no apparent distress, AAO x 3  Skin: Dry and intact  HEENT: Oral mucosa moist. No pharyngeal exudates or tonsillar enlargement  Eyes: Conjunctiva normal  Cardiac: Regular rate and rhythm  Respiratory: Lungs clear b/l and symmetric. No respiratory distress  Gastrointestinal: Abdomen soft, nondistended, nontender  Musculoskeletal: Moves all extremities spontaneously  Neurological: alert and oriented to personal, place and time  Psychiatric: Cooperative    a/p  cough  nontoxic appearing  ?viral syndrome vs pna vs ACEI cough  labs, cxr

## 2019-08-12 NOTE — ED PROVIDER NOTE - CLINICAL SUMMARY MEDICAL DECISION MAKING FREE TEXT BOX
69F PMH HLD, HTN, DM2 presents with cough of 2 weeks productive of white sputum with chest pain only when she coughs and abdominal soreness she attributes to the coughing. Was given ?ciprofloxacin 10 day course currently on day 7 that hasn't helped and promethezine which has helped a bit. Denies fevers, exertional chest pain, nausea, SOB, diaphoresis, leg swelling. Vitals on presentations show hypertensive to 177/84 otherwise reassuring. Exam shows non-erythematous oropharynx, clear lungs, no abdominal tenderness, no unilateral leg swelling or calf tenderness, with equal pulses in all extremities. Likely bronchitis viral vs bacterial vs post-nasal drip. Basics, CXR, lactate, fluids. Reassess. 69F PMH HLD, HTN, DM2 presents with cough of 2 weeks productive of white sputum with chest pain only when she coughs and abdominal soreness she attributes to the coughing. Was given ?ciprofloxacin 10 day course currently on day 7 that hasn't helped and promethezine which has helped a bit. Denies fevers, exertional chest pain, nausea, SOB, diaphoresis, leg swelling. Vitals on presentations show hypertensive to 177/84 otherwise reassuring. Exam shows non-erythematous oropharynx, clear lungs, no abdominal tenderness, no unilateral leg swelling or calf tenderness, with equal pulses in all extremities. Likely bronchitis viral vs bacterial vs post-nasal drip. Basics, CXR, lactate, fluids. Reassess.   ?ACEI associated cough? patient started on enalapril 3 months ago.

## 2019-08-13 NOTE — PRE-OP CHECKLIST - RESPIRATORY RATE (BREATHS/MIN)
Preceptor Attestation:   Patient seen, evaluated and discussed with the resident. I have verified the content of the note, which accurately reflects my assessment of the patient and the plan of care.   Supervising Physician:  Jorge Harry MD   19

## 2020-10-16 ENCOUNTER — INPATIENT (INPATIENT)
Facility: HOSPITAL | Age: 70
LOS: 4 days | Discharge: HOME HEALTH | End: 2020-10-21
Attending: HOSPITALIST | Admitting: HOSPITALIST
Payer: MEDICAID

## 2020-10-16 VITALS
HEIGHT: 61 IN | TEMPERATURE: 98 F | DIASTOLIC BLOOD PRESSURE: 68 MMHG | HEART RATE: 71 BPM | SYSTOLIC BLOOD PRESSURE: 131 MMHG | OXYGEN SATURATION: 100 % | RESPIRATION RATE: 18 BRPM

## 2020-10-16 LAB
ALBUMIN SERPL ELPH-MCNC: 4.9 G/DL — SIGNIFICANT CHANGE UP (ref 3.3–5)
ALP SERPL-CCNC: 71 U/L — SIGNIFICANT CHANGE UP (ref 40–120)
ALT FLD-CCNC: 59 U/L — HIGH (ref 4–33)
ANION GAP SERPL CALC-SCNC: 16 MMO/L — HIGH (ref 7–14)
APPEARANCE UR: CLEAR — SIGNIFICANT CHANGE UP
APTT BLD: 30.1 SEC — SIGNIFICANT CHANGE UP (ref 27–36.3)
AST SERPL-CCNC: 78 U/L — HIGH (ref 4–32)
BASE EXCESS BLDV CALC-SCNC: 2.3 MMOL/L — SIGNIFICANT CHANGE UP
BASOPHILS # BLD AUTO: 0.06 K/UL — SIGNIFICANT CHANGE UP (ref 0–0.2)
BASOPHILS NFR BLD AUTO: 0.6 % — SIGNIFICANT CHANGE UP (ref 0–2)
BILIRUB SERPL-MCNC: 0.5 MG/DL — SIGNIFICANT CHANGE UP (ref 0.2–1.2)
BILIRUB UR-MCNC: NEGATIVE — SIGNIFICANT CHANGE UP
BLOOD GAS VENOUS - CREATININE: 0.58 MG/DL — SIGNIFICANT CHANGE UP (ref 0.5–1.3)
BLOOD GAS VENOUS - FIO2: 21 — SIGNIFICANT CHANGE UP
BLOOD UR QL VISUAL: NEGATIVE — SIGNIFICANT CHANGE UP
BUN SERPL-MCNC: 10 MG/DL — SIGNIFICANT CHANGE UP (ref 7–23)
CALCIUM SERPL-MCNC: 9.7 MG/DL — SIGNIFICANT CHANGE UP (ref 8.4–10.5)
CHLORIDE BLDV-SCNC: 106 MMOL/L — SIGNIFICANT CHANGE UP (ref 96–108)
CHLORIDE SERPL-SCNC: 96 MMOL/L — LOW (ref 98–107)
CO2 SERPL-SCNC: 25 MMOL/L — SIGNIFICANT CHANGE UP (ref 22–31)
COLOR SPEC: COLORLESS — SIGNIFICANT CHANGE UP
CREAT SERPL-MCNC: 0.53 MG/DL — SIGNIFICANT CHANGE UP (ref 0.5–1.3)
EOSINOPHIL # BLD AUTO: 0.22 K/UL — SIGNIFICANT CHANGE UP (ref 0–0.5)
EOSINOPHIL NFR BLD AUTO: 2.3 % — SIGNIFICANT CHANGE UP (ref 0–6)
GAS PNL BLDV: 130 MMOL/L — LOW (ref 136–146)
GLUCOSE BLDV-MCNC: 385 MG/DL — HIGH (ref 70–99)
GLUCOSE SERPL-MCNC: 393 MG/DL — HIGH (ref 70–99)
GLUCOSE UR-MCNC: 30 — SIGNIFICANT CHANGE UP
HCO3 BLDV-SCNC: 26 MMOL/L — SIGNIFICANT CHANGE UP (ref 20–27)
HCT VFR BLD CALC: 37.9 % — SIGNIFICANT CHANGE UP (ref 34.5–45)
HCT VFR BLDV CALC: 40.1 % — SIGNIFICANT CHANGE UP (ref 34.5–45)
HGB BLD-MCNC: 12.5 G/DL — SIGNIFICANT CHANGE UP (ref 11.5–15.5)
HGB BLDV-MCNC: 13 G/DL — SIGNIFICANT CHANGE UP (ref 11.5–15.5)
IMM GRANULOCYTES NFR BLD AUTO: 0.2 % — SIGNIFICANT CHANGE UP (ref 0–1.5)
INR BLD: 1.05 — SIGNIFICANT CHANGE UP (ref 0.88–1.16)
KETONES UR-MCNC: NEGATIVE — SIGNIFICANT CHANGE UP
LACTATE BLDV-MCNC: 4.6 MMOL/L — CRITICAL HIGH (ref 0.5–2)
LACTATE SERPL-SCNC: 5.2 MMOL/L — CRITICAL HIGH (ref 0.5–2)
LEUKOCYTE ESTERASE UR-ACNC: NEGATIVE — SIGNIFICANT CHANGE UP
LIDOCAIN IGE QN: 92.7 U/L — HIGH (ref 7–60)
LYMPHOCYTES # BLD AUTO: 2.34 K/UL — SIGNIFICANT CHANGE UP (ref 1–3.3)
LYMPHOCYTES # BLD AUTO: 24.1 % — SIGNIFICANT CHANGE UP (ref 13–44)
MCHC RBC-ENTMCNC: 24.6 PG — LOW (ref 27–34)
MCHC RBC-ENTMCNC: 33 % — SIGNIFICANT CHANGE UP (ref 32–36)
MCV RBC AUTO: 74.5 FL — LOW (ref 80–100)
MONOCYTES # BLD AUTO: 0.62 K/UL — SIGNIFICANT CHANGE UP (ref 0–0.9)
MONOCYTES NFR BLD AUTO: 6.4 % — SIGNIFICANT CHANGE UP (ref 2–14)
NEUTROPHILS # BLD AUTO: 6.44 K/UL — SIGNIFICANT CHANGE UP (ref 1.8–7.4)
NEUTROPHILS NFR BLD AUTO: 66.4 % — SIGNIFICANT CHANGE UP (ref 43–77)
NITRITE UR-MCNC: NEGATIVE — SIGNIFICANT CHANGE UP
NRBC # FLD: 0 K/UL — SIGNIFICANT CHANGE UP (ref 0–0)
PCO2 BLDV: 45 MMHG — SIGNIFICANT CHANGE UP (ref 41–51)
PH BLDV: 7.39 PH — SIGNIFICANT CHANGE UP (ref 7.32–7.43)
PH UR: 7.5 — SIGNIFICANT CHANGE UP (ref 5–8)
PLATELET # BLD AUTO: 256 K/UL — SIGNIFICANT CHANGE UP (ref 150–400)
PMV BLD: 11.4 FL — SIGNIFICANT CHANGE UP (ref 7–13)
PO2 BLDV: 42 MMHG — HIGH (ref 35–40)
POTASSIUM BLDV-SCNC: 3.3 MMOL/L — LOW (ref 3.4–4.5)
POTASSIUM SERPL-MCNC: 3.6 MMOL/L — SIGNIFICANT CHANGE UP (ref 3.5–5.3)
POTASSIUM SERPL-SCNC: 3.6 MMOL/L — SIGNIFICANT CHANGE UP (ref 3.5–5.3)
PROT SERPL-MCNC: 7.8 G/DL — SIGNIFICANT CHANGE UP (ref 6–8.3)
PROT UR-MCNC: 10 — SIGNIFICANT CHANGE UP
PROTHROM AB SERPL-ACNC: 12.1 SEC — SIGNIFICANT CHANGE UP (ref 10.6–13.6)
RBC # BLD: 5.09 M/UL — SIGNIFICANT CHANGE UP (ref 3.8–5.2)
RBC # FLD: 14.6 % — HIGH (ref 10.3–14.5)
SAO2 % BLDV: 72.1 % — SIGNIFICANT CHANGE UP (ref 60–85)
SARS-COV-2 RNA SPEC QL NAA+PROBE: SIGNIFICANT CHANGE UP
SODIUM SERPL-SCNC: 137 MMOL/L — SIGNIFICANT CHANGE UP (ref 135–145)
SP GR SPEC: 1.03 — SIGNIFICANT CHANGE UP (ref 1–1.04)
TROPONIN T, HIGH SENSITIVITY: 6 NG/L — SIGNIFICANT CHANGE UP (ref ?–14)
TROPONIN T, HIGH SENSITIVITY: 7 NG/L — SIGNIFICANT CHANGE UP (ref ?–14)
UROBILINOGEN FLD QL: NORMAL — SIGNIFICANT CHANGE UP
WBC # BLD: 9.7 K/UL — SIGNIFICANT CHANGE UP (ref 3.8–10.5)
WBC # FLD AUTO: 9.7 K/UL — SIGNIFICANT CHANGE UP (ref 3.8–10.5)

## 2020-10-16 PROCEDURE — 71046 X-RAY EXAM CHEST 2 VIEWS: CPT | Mod: 26

## 2020-10-16 PROCEDURE — 93010 ELECTROCARDIOGRAM REPORT: CPT

## 2020-10-16 PROCEDURE — 74177 CT ABD & PELVIS W/CONTRAST: CPT | Mod: 26

## 2020-10-16 PROCEDURE — 99285 EMERGENCY DEPT VISIT HI MDM: CPT

## 2020-10-16 RX ORDER — SODIUM CHLORIDE 9 MG/ML
1000 INJECTION INTRAMUSCULAR; INTRAVENOUS; SUBCUTANEOUS ONCE
Refills: 0 | Status: COMPLETED | OUTPATIENT
Start: 2020-10-16 | End: 2020-10-16

## 2020-10-16 RX ORDER — ONDANSETRON 8 MG/1
4 TABLET, FILM COATED ORAL ONCE
Refills: 0 | Status: COMPLETED | OUTPATIENT
Start: 2020-10-16 | End: 2020-10-16

## 2020-10-16 RX ADMIN — SODIUM CHLORIDE 1000 MILLILITER(S): 9 INJECTION INTRAMUSCULAR; INTRAVENOUS; SUBCUTANEOUS at 19:58

## 2020-10-16 RX ADMIN — SODIUM CHLORIDE 1000 MILLILITER(S): 9 INJECTION INTRAMUSCULAR; INTRAVENOUS; SUBCUTANEOUS at 13:14

## 2020-10-16 NOTE — ED PROVIDER NOTE - PROGRESS NOTE DETAILS
Lisset Garcia, resident MD: pt has persistently elevated lactate with recent episodes of vomiting, minimally tender abdomen on exam but will obtain CT a/p to evaluate. negative delta trop. discussed with pt admission for further cardiac evaluation and pt agrees. Lisset Garcia, resident MD: pt has persistently elevated lactate with recent episodes of vomiting, minimally tender abdomen on exam but will obtain CT a/p to evaluate. negative delta trop. discussed admission with pt and pt's daughter (Doni Moura) for further cardiac evaluation and pt agrees.   ID#: [id number] Lisset Garcia, resident MD: pt has persistently elevated lactate with recent episodes of vomiting, minimally tender abdomen on exam but will obtain CT a/p to evaluate. negative delta trop. discussed admission with pt and pt's daughter (Doni Moura) for further evaluation and pt agrees.   ID#: [id number] PGY 1 Sahil Irving: ED work up completed. Attempting to admit pt, but she is refusing admission until she can speak with her son when he gets off of work at 1 am. Attending MD Myers.  Pt signed out to me in stable condition pending TBA for ACS.  Pt is a 69 yo fem with CP, SOB, nausea, vomiting and non-specific sxs, lactate increasing, nausea persistent, CTAP non-actionable, planned ACS r/o, pt wants to discuss with son and decide if she is willing to stay. PGY 1 Sahil Irving: Spoke w/ pt and daughter. Pt agreeable to admission. Hospitalist paged. Attending MD Myers.  Pt amenable to admission after speaking to her daughter.  Continues to endorse nausea.  Lactate improved.  Stable for admission to medicine for ACS concern.

## 2020-10-16 NOTE — ED PROVIDER NOTE - PHYSICAL EXAMINATION
General: well-appearing elderly woman in no acute distress  Head: normocephalic, atraumatic  Eyes: PERRL, EOMI, no nystagmus  Mouth: moist mucous membranes  Neck: supple neck  CV: normal rate and rhythm, no LE edema or tenderness to palpation, peripheral pulses 2+ bilaterally  Respiratory: clear to auscultation bilaterally  Abdomen: soft but mildly distended but nontender to palpation  : no suprapubic tenderness, no CVAT  Neuro: alert and oriented x3, CN III-XII intact, strength 5/5 UE and LE bilaterally  Skin: no rash noted  Extremities: no joint deformities

## 2020-10-16 NOTE — ED PROVIDER NOTE - NS ED ROS FT
Gen: Denies fever  HEENT: Denies headache and congestion.  CV: + cp. Denies lightheadedness, and LE swelling.  Skin: Denies rash.   Resp: +cough and SOB.  GI: + abd pain, and vomiting. no diarrhea. Denies melena and hematochezia.  Msk: Denies recent trauma and extremity pain.  : Denies dysuria and hematuria.  Neuro: +dizziness. Denies LOC, and new numbness/tingling or new focal weakness  Psych: Denies SI

## 2020-10-16 NOTE — ED ADULT TRIAGE NOTE - CHIEF COMPLAINT QUOTE
Pt brought in by son for c/o chest pain, SOB, dizziness, and vomiting x 2 weeks. Greenlandic-speaking. Son Luis Felipe [phone] (additional contacts on chart)

## 2020-10-16 NOTE — ED PROVIDER NOTE - ATTENDING CONTRIBUTION TO CARE
agree with resident note    " 71yo woman PMH HTN, HLD, DM presents with midchest pain and SOB with n/v x 2-3 weeks and intermittent episodes of dizziness, spinning sensation, but no dizziness at this time. Also has a nonproductive cough with no fever. She also complains of diffuse abdominal pain with no focal area of pain. No dysuria."    PE: well appearing; VSS; CTAB/L; s1 s2 no m/r/g abd soft/NT/ND ext: no edema Neuro: CNs intact; no nystagmus; FTN wnl; RAMIRO wnl; 5/5 motor UE and LE; sensation intact    Imp: chest pain in elderly lady with HTN, HLD, DM; EKG non ischemic; labs, CXR, reassess

## 2020-10-16 NOTE — ED PROVIDER NOTE - OBJECTIVE STATEMENT
71yo woman PMH HTN, HLD, DM presents with midchest pain and SOB with n/v x 2-3 weeks and intermittent episodes of dizziness, spinning sensation, but no dizziness at this time. Also has a nonproductive cough with no fever. She also complains of diffuse abdominal pain with no focal area of pain. No dysuria.

## 2020-10-16 NOTE — ED PROVIDER NOTE - CLINICAL SUMMARY MEDICAL DECISION MAKING FREE TEXT BOX
71yo woman PMH HTN, HLD, DM presents with chest pain, SOB, cough, and multiple episodes of vomiting x 2 weeks with abdominal discomfort and intermittent episodes of dizziness but appears well on exam with no respiratory distress or abdominal tenderness and no neurologic deficits. Concern for cardiac etiology of symptoms vs respiratory/infectious etiology. Low suspicion for intra-abdominal pathology with nontender abdomen on exam. Will obtain blood work, ekg, cxr, give fluids and anti-emetics and reassess. Will likely require admission for high risk chest pain evaluation.

## 2020-10-16 NOTE — ED ADULT NURSE NOTE - OBJECTIVE STATEMENT
pt with hx of DM HTN presents with multiple c/o of non productive cough, chest pain and vomiting x 3 weeks. pt appears well in NAD abd round and distended but non tender. denies urinary s/o. IV 20 g R forearm labs sent TQ removed CCM in place. no current vomiting.

## 2020-10-16 NOTE — ED ADULT NURSE NOTE - CHIEF COMPLAINT QUOTE
Pt brought in by son for c/o chest pain, SOB, dizziness, and vomiting x 2 weeks. Irish-speaking. Son Luis Felipe [phone] (additional contacts on chart)

## 2020-10-17 DIAGNOSIS — Z29.9 ENCOUNTER FOR PROPHYLACTIC MEASURES, UNSPECIFIED: ICD-10-CM

## 2020-10-17 DIAGNOSIS — E78.5 HYPERLIPIDEMIA, UNSPECIFIED: ICD-10-CM

## 2020-10-17 DIAGNOSIS — R07.9 CHEST PAIN, UNSPECIFIED: ICD-10-CM

## 2020-10-17 DIAGNOSIS — R33.9 RETENTION OF URINE, UNSPECIFIED: ICD-10-CM

## 2020-10-17 DIAGNOSIS — I10 ESSENTIAL (PRIMARY) HYPERTENSION: ICD-10-CM

## 2020-10-17 DIAGNOSIS — E78.49 OTHER HYPERLIPIDEMIA: ICD-10-CM

## 2020-10-17 DIAGNOSIS — E11.9 TYPE 2 DIABETES MELLITUS WITHOUT COMPLICATIONS: ICD-10-CM

## 2020-10-17 DIAGNOSIS — R10.9 UNSPECIFIED ABDOMINAL PAIN: ICD-10-CM

## 2020-10-17 DIAGNOSIS — E11.65 TYPE 2 DIABETES MELLITUS WITH HYPERGLYCEMIA: ICD-10-CM

## 2020-10-17 LAB
ALBUMIN SERPL ELPH-MCNC: 4.7 G/DL — SIGNIFICANT CHANGE UP (ref 3.3–5)
ALP SERPL-CCNC: 68 U/L — SIGNIFICANT CHANGE UP (ref 40–120)
ALT FLD-CCNC: 59 U/L — HIGH (ref 4–33)
AMYLASE P1 CFR SERPL: 64 U/L — SIGNIFICANT CHANGE UP (ref 25–125)
ANION GAP SERPL CALC-SCNC: 16 MMO/L — HIGH (ref 7–14)
AST SERPL-CCNC: 75 U/L — HIGH (ref 4–32)
B-OH-BUTYR SERPL-SCNC: 0.9 MMOL/L — HIGH (ref 0–0.4)
BASE EXCESS BLDV CALC-SCNC: 3.3 MMOL/L — SIGNIFICANT CHANGE UP
BASOPHILS # BLD AUTO: 0.05 K/UL — SIGNIFICANT CHANGE UP (ref 0–0.2)
BASOPHILS NFR BLD AUTO: 0.6 % — SIGNIFICANT CHANGE UP (ref 0–2)
BILIRUB SERPL-MCNC: 0.8 MG/DL — SIGNIFICANT CHANGE UP (ref 0.2–1.2)
BLOOD GAS VENOUS - CREATININE: 0.5 MG/DL — SIGNIFICANT CHANGE UP (ref 0.5–1.3)
BUN SERPL-MCNC: 7 MG/DL — SIGNIFICANT CHANGE UP (ref 7–23)
CALCIUM SERPL-MCNC: 9.2 MG/DL — SIGNIFICANT CHANGE UP (ref 8.4–10.5)
CHLORIDE BLDV-SCNC: 104 MMOL/L — SIGNIFICANT CHANGE UP (ref 96–108)
CHLORIDE SERPL-SCNC: 98 MMOL/L — SIGNIFICANT CHANGE UP (ref 98–107)
CHOLEST SERPL-MCNC: 131 MG/DL — SIGNIFICANT CHANGE UP (ref 120–199)
CO2 SERPL-SCNC: 26 MMOL/L — SIGNIFICANT CHANGE UP (ref 22–31)
CREAT SERPL-MCNC: 0.41 MG/DL — LOW (ref 0.5–1.3)
EOSINOPHIL # BLD AUTO: 0.16 K/UL — SIGNIFICANT CHANGE UP (ref 0–0.5)
EOSINOPHIL NFR BLD AUTO: 1.9 % — SIGNIFICANT CHANGE UP (ref 0–6)
GAS PNL BLDV: 140 MMOL/L — SIGNIFICANT CHANGE UP (ref 136–146)
GGT SERPL-CCNC: 35 U/L — SIGNIFICANT CHANGE UP (ref 5–36)
GLUCOSE BLDV-MCNC: 274 MG/DL — HIGH (ref 70–99)
GLUCOSE SERPL-MCNC: 262 MG/DL — HIGH (ref 70–99)
HBA1C BLD-MCNC: 9.3 % — HIGH (ref 4–5.6)
HCO3 BLDV-SCNC: 27 MMOL/L — SIGNIFICANT CHANGE UP (ref 20–27)
HCT VFR BLD CALC: 40.2 % — SIGNIFICANT CHANGE UP (ref 34.5–45)
HCT VFR BLDV CALC: 39.6 % — SIGNIFICANT CHANGE UP (ref 34.5–45)
HDLC SERPL-MCNC: 53 MG/DL — SIGNIFICANT CHANGE UP (ref 45–65)
HGB BLD-MCNC: 12.6 G/DL — SIGNIFICANT CHANGE UP (ref 11.5–15.5)
HGB BLDV-MCNC: 12.9 G/DL — SIGNIFICANT CHANGE UP (ref 11.5–15.5)
IMM GRANULOCYTES NFR BLD AUTO: 0.2 % — SIGNIFICANT CHANGE UP (ref 0–1.5)
LACTATE BLDV-MCNC: 2.7 MMOL/L — HIGH (ref 0.5–2)
LACTATE SERPL-SCNC: 2.5 MMOL/L — HIGH (ref 0.5–2)
LIDOCAIN IGE QN: 81.4 U/L — HIGH (ref 7–60)
LIPID PNL WITH DIRECT LDL SERPL: 66 MG/DL — SIGNIFICANT CHANGE UP
LYMPHOCYTES # BLD AUTO: 2.2 K/UL — SIGNIFICANT CHANGE UP (ref 1–3.3)
LYMPHOCYTES # BLD AUTO: 25.5 % — SIGNIFICANT CHANGE UP (ref 13–44)
MAGNESIUM SERPL-MCNC: 1.3 MG/DL — LOW (ref 1.6–2.6)
MCHC RBC-ENTMCNC: 24 PG — LOW (ref 27–34)
MCHC RBC-ENTMCNC: 31.3 % — LOW (ref 32–36)
MCV RBC AUTO: 76.4 FL — LOW (ref 80–100)
MONOCYTES # BLD AUTO: 0.47 K/UL — SIGNIFICANT CHANGE UP (ref 0–0.9)
MONOCYTES NFR BLD AUTO: 5.4 % — SIGNIFICANT CHANGE UP (ref 2–14)
NEUTROPHILS # BLD AUTO: 5.73 K/UL — SIGNIFICANT CHANGE UP (ref 1.8–7.4)
NEUTROPHILS NFR BLD AUTO: 66.4 % — SIGNIFICANT CHANGE UP (ref 43–77)
NRBC # FLD: 0 K/UL — SIGNIFICANT CHANGE UP (ref 0–0)
PCO2 BLDV: 45 MMHG — SIGNIFICANT CHANGE UP (ref 41–51)
PH BLDV: 7.41 PH — SIGNIFICANT CHANGE UP (ref 7.32–7.43)
PHOSPHATE SERPL-MCNC: 3.4 MG/DL — SIGNIFICANT CHANGE UP (ref 2.5–4.5)
PLATELET # BLD AUTO: 257 K/UL — SIGNIFICANT CHANGE UP (ref 150–400)
PMV BLD: 12.2 FL — SIGNIFICANT CHANGE UP (ref 7–13)
PO2 BLDV: 58 MMHG — HIGH (ref 35–40)
POTASSIUM BLDV-SCNC: 3 MMOL/L — LOW (ref 3.4–4.5)
POTASSIUM SERPL-MCNC: 3.3 MMOL/L — LOW (ref 3.5–5.3)
POTASSIUM SERPL-SCNC: 3.3 MMOL/L — LOW (ref 3.5–5.3)
PROT SERPL-MCNC: 7.7 G/DL — SIGNIFICANT CHANGE UP (ref 6–8.3)
RBC # BLD: 5.26 M/UL — HIGH (ref 3.8–5.2)
RBC # FLD: 14.6 % — HIGH (ref 10.3–14.5)
SAO2 % BLDV: 87.5 % — HIGH (ref 60–85)
SODIUM SERPL-SCNC: 140 MMOL/L — SIGNIFICANT CHANGE UP (ref 135–145)
TRIGL SERPL-MCNC: 138 MG/DL — SIGNIFICANT CHANGE UP (ref 10–149)
TSH SERPL-MCNC: 3.86 UIU/ML — SIGNIFICANT CHANGE UP (ref 0.27–4.2)
WBC # BLD: 8.63 K/UL — SIGNIFICANT CHANGE UP (ref 3.8–10.5)
WBC # FLD AUTO: 8.63 K/UL — SIGNIFICANT CHANGE UP (ref 3.8–10.5)

## 2020-10-17 PROCEDURE — 93010 ELECTROCARDIOGRAM REPORT: CPT

## 2020-10-17 PROCEDURE — 99222 1ST HOSP IP/OBS MODERATE 55: CPT | Mod: GC

## 2020-10-17 PROCEDURE — 99223 1ST HOSP IP/OBS HIGH 75: CPT

## 2020-10-17 PROCEDURE — 99254 IP/OBS CNSLTJ NEW/EST MOD 60: CPT

## 2020-10-17 RX ORDER — DEXTROSE 50 % IN WATER 50 %
25 SYRINGE (ML) INTRAVENOUS ONCE
Refills: 0 | Status: DISCONTINUED | OUTPATIENT
Start: 2020-10-17 | End: 2020-10-21

## 2020-10-17 RX ORDER — MAGNESIUM SULFATE 500 MG/ML
1 VIAL (ML) INJECTION ONCE
Refills: 0 | Status: COMPLETED | OUTPATIENT
Start: 2020-10-17 | End: 2020-10-17

## 2020-10-17 RX ORDER — INSULIN LISPRO 100/ML
VIAL (ML) SUBCUTANEOUS
Refills: 0 | Status: DISCONTINUED | OUTPATIENT
Start: 2020-10-17 | End: 2020-10-17

## 2020-10-17 RX ORDER — SODIUM CHLORIDE 9 MG/ML
1000 INJECTION, SOLUTION INTRAVENOUS
Refills: 0 | Status: DISCONTINUED | OUTPATIENT
Start: 2020-10-17 | End: 2020-10-21

## 2020-10-17 RX ORDER — POTASSIUM CHLORIDE 20 MEQ
40 PACKET (EA) ORAL EVERY 4 HOURS
Refills: 0 | Status: COMPLETED | OUTPATIENT
Start: 2020-10-17 | End: 2020-10-17

## 2020-10-17 RX ORDER — ASPIRIN/CALCIUM CARB/MAGNESIUM 324 MG
81 TABLET ORAL DAILY
Refills: 0 | Status: DISCONTINUED | OUTPATIENT
Start: 2020-10-17 | End: 2020-10-21

## 2020-10-17 RX ORDER — INSULIN LISPRO 100/ML
VIAL (ML) SUBCUTANEOUS AT BEDTIME
Refills: 0 | Status: DISCONTINUED | OUTPATIENT
Start: 2020-10-17 | End: 2020-10-17

## 2020-10-17 RX ORDER — MAGNESIUM SULFATE 500 MG/ML
2 VIAL (ML) INJECTION ONCE
Refills: 0 | Status: COMPLETED | OUTPATIENT
Start: 2020-10-17 | End: 2020-10-17

## 2020-10-17 RX ORDER — DEXTROSE 50 % IN WATER 50 %
15 SYRINGE (ML) INTRAVENOUS ONCE
Refills: 0 | Status: DISCONTINUED | OUTPATIENT
Start: 2020-10-17 | End: 2020-10-21

## 2020-10-17 RX ORDER — INSULIN LISPRO 100/ML
6 VIAL (ML) SUBCUTANEOUS
Refills: 0 | Status: DISCONTINUED | OUTPATIENT
Start: 2020-10-17 | End: 2020-10-18

## 2020-10-17 RX ORDER — ENOXAPARIN SODIUM 100 MG/ML
40 INJECTION SUBCUTANEOUS DAILY
Refills: 0 | Status: DISCONTINUED | OUTPATIENT
Start: 2020-10-17 | End: 2020-10-21

## 2020-10-17 RX ORDER — INSULIN GLARGINE 100 [IU]/ML
18 INJECTION, SOLUTION SUBCUTANEOUS ONCE
Refills: 0 | Status: COMPLETED | OUTPATIENT
Start: 2020-10-17 | End: 2020-10-17

## 2020-10-17 RX ORDER — AMLODIPINE BESYLATE 2.5 MG/1
10 TABLET ORAL DAILY
Refills: 0 | Status: DISCONTINUED | OUTPATIENT
Start: 2020-10-17 | End: 2020-10-21

## 2020-10-17 RX ORDER — PANTOPRAZOLE SODIUM 20 MG/1
40 TABLET, DELAYED RELEASE ORAL
Refills: 0 | Status: DISCONTINUED | OUTPATIENT
Start: 2020-10-17 | End: 2020-10-21

## 2020-10-17 RX ORDER — INSULIN GLARGINE 100 [IU]/ML
12 INJECTION, SOLUTION SUBCUTANEOUS ONCE
Refills: 0 | Status: DISCONTINUED | OUTPATIENT
Start: 2020-10-17 | End: 2020-10-17

## 2020-10-17 RX ORDER — POTASSIUM CHLORIDE 20 MEQ
40 PACKET (EA) ORAL EVERY 4 HOURS
Refills: 0 | Status: DISCONTINUED | OUTPATIENT
Start: 2020-10-17 | End: 2020-10-17

## 2020-10-17 RX ORDER — MAGNESIUM SULFATE 500 MG/ML
4 VIAL (ML) INJECTION ONCE
Refills: 0 | Status: DISCONTINUED | OUTPATIENT
Start: 2020-10-17 | End: 2020-10-17

## 2020-10-17 RX ORDER — PREGABALIN 225 MG/1
1000 CAPSULE ORAL DAILY
Refills: 0 | Status: DISCONTINUED | OUTPATIENT
Start: 2020-10-17 | End: 2020-10-21

## 2020-10-17 RX ORDER — INSULIN LISPRO 100/ML
VIAL (ML) SUBCUTANEOUS
Refills: 0 | Status: DISCONTINUED | OUTPATIENT
Start: 2020-10-17 | End: 2020-10-21

## 2020-10-17 RX ORDER — INSULIN LISPRO 100/ML
6 VIAL (ML) SUBCUTANEOUS ONCE
Refills: 0 | Status: COMPLETED | OUTPATIENT
Start: 2020-10-17 | End: 2020-10-17

## 2020-10-17 RX ORDER — METOPROLOL TARTRATE 50 MG
50 TABLET ORAL THREE TIMES A DAY
Refills: 0 | Status: DISCONTINUED | OUTPATIENT
Start: 2020-10-17 | End: 2020-10-21

## 2020-10-17 RX ORDER — INSULIN LISPRO 100/ML
VIAL (ML) SUBCUTANEOUS AT BEDTIME
Refills: 0 | Status: DISCONTINUED | OUTPATIENT
Start: 2020-10-17 | End: 2020-10-21

## 2020-10-17 RX ORDER — DEXTROSE 50 % IN WATER 50 %
12.5 SYRINGE (ML) INTRAVENOUS ONCE
Refills: 0 | Status: DISCONTINUED | OUTPATIENT
Start: 2020-10-17 | End: 2020-10-21

## 2020-10-17 RX ORDER — MAGNESIUM SULFATE 500 MG/ML
2 VIAL (ML) INJECTION ONCE
Refills: 0 | Status: DISCONTINUED | OUTPATIENT
Start: 2020-10-17 | End: 2020-10-17

## 2020-10-17 RX ORDER — GLUCAGON INJECTION, SOLUTION 0.5 MG/.1ML
1 INJECTION, SOLUTION SUBCUTANEOUS ONCE
Refills: 0 | Status: DISCONTINUED | OUTPATIENT
Start: 2020-10-17 | End: 2020-10-21

## 2020-10-17 RX ORDER — ACETAMINOPHEN 500 MG
650 TABLET ORAL EVERY 6 HOURS
Refills: 0 | Status: DISCONTINUED | OUTPATIENT
Start: 2020-10-17 | End: 2020-10-21

## 2020-10-17 RX ORDER — SIMVASTATIN 20 MG/1
40 TABLET, FILM COATED ORAL AT BEDTIME
Refills: 0 | Status: DISCONTINUED | OUTPATIENT
Start: 2020-10-17 | End: 2020-10-20

## 2020-10-17 RX ADMIN — ENOXAPARIN SODIUM 40 MILLIGRAM(S): 100 INJECTION SUBCUTANEOUS at 13:04

## 2020-10-17 RX ADMIN — Medication 81 MILLIGRAM(S): at 13:04

## 2020-10-17 RX ADMIN — Medication 6: at 12:29

## 2020-10-17 RX ADMIN — Medication 4: at 18:13

## 2020-10-17 RX ADMIN — Medication 40 MILLIEQUIVALENT(S): at 11:05

## 2020-10-17 RX ADMIN — Medication 50 MILLIGRAM(S): at 15:18

## 2020-10-17 RX ADMIN — Medication 50 GRAM(S): at 12:30

## 2020-10-17 RX ADMIN — AMLODIPINE BESYLATE 10 MILLIGRAM(S): 2.5 TABLET ORAL at 09:40

## 2020-10-17 RX ADMIN — INSULIN GLARGINE 18 UNIT(S): 100 INJECTION, SOLUTION SUBCUTANEOUS at 16:35

## 2020-10-17 RX ADMIN — Medication 2: at 09:35

## 2020-10-17 RX ADMIN — PREGABALIN 1000 MICROGRAM(S): 225 CAPSULE ORAL at 13:04

## 2020-10-17 RX ADMIN — Medication 40 MILLIEQUIVALENT(S): at 15:19

## 2020-10-17 RX ADMIN — Medication 6 UNIT(S): at 13:03

## 2020-10-17 RX ADMIN — Medication 6 UNIT(S): at 18:14

## 2020-10-17 RX ADMIN — SIMVASTATIN 40 MILLIGRAM(S): 20 TABLET, FILM COATED ORAL at 22:15

## 2020-10-17 RX ADMIN — Medication 50 MILLIGRAM(S): at 22:15

## 2020-10-17 RX ADMIN — PANTOPRAZOLE SODIUM 40 MILLIGRAM(S): 20 TABLET, DELAYED RELEASE ORAL at 11:05

## 2020-10-17 NOTE — H&P ADULT - ATTENDING COMMENTS
Patient seen at bedside on 5N, [facility]  #[id number]. Pt reports mild abdominal discomfort, but otherwise denies chest pain or sob at this time.   Briefly, 70F, w/HTN, HLD, T2DM, p/w chest pain, abd pain and sob.   #R/O ACS: cardiac troponin negative x2, TTE pending, low suspicion for cardiac disease, will monitor on telemetry for now.   #AGMA: likely 2/2 underlying lactic acidosis (now downtrending), poss 2/2 Metformin use (son reports pt taking Metformin 1000mg TID per PMD for uncontrolled DMII) vs mild DKA, although pH on VBG 7.4 and no ketones in urine   #Uncontrolled DMII: A1c 9.3, BHB .9, takes Januvia, Glipizide and Metformin increased to 1000mg TID per PMD.   -Endocrine consulted, son states pt will be amenable to insulin if needed.   #Abdominal discomfort: intermittent abdominal pain, has had normal gastric emptying study in 2019  -CTAP w/rectal wall thickening r/o proctitis and an adenomyomatosis or fundal gallbladder wall calcification r/o porcelain gallbladder  -Pt reports normal C-scope 10 years ago and normal EGD 5 years ago  -RUQ US ordered to assess gall bladder further   -GI consulted for possible inpt c-scope, f/u recs  #Indeterminate hepatic lesion: seen on CT in 2019, recommended for outpt MRI, has not followed up. GI consulted, f/u recs   #Hypokalemia: replete K >4  #Hypomagnesemia: replete Mg >2    Son, Johny, updated with plan on 10/17

## 2020-10-17 NOTE — CONSULT NOTE ADULT - SUBJECTIVE AND OBJECTIVE BOX
Patient is a 70y old  Female who presents with a chief complaint of Chest Pain      HPI:  69 y/o Estonian speaking female, with a PmHx of HTN, HLD, DM, presented to the [facility] ED c/o chest pain, abd pain and sob. Pt states she has been having stomach pain for about 2-3 years and comes and goes. For the past week she states she has been having episodes of dizziness, emesis, SOB and a left sided, non-radiating 5-6/10, dull pressure like sensation in her chest. She states her symptoms have been intermittent but she wasn't getting any better so she came to the [facility] ED for an evaluation. She denies any fever, cough, chills, HA, blurred vision, recent travel. She states her  has been having a cough for the past week as well but has no fever. Pt denies any episodes of constipation or diarrhea and the pain in her abdomen is " above and below her stomach" as per the . In the ED, she had a CT abd/pelvis that was done that showed a rectal wall thickening r/o proctitis and an adenomyomatosis or fundal gallbladder wall calcification r/o porcelain gallbladder. She was also found to have an elevated Lipase and Lactate levels. She appears comfortable at this time and states she doesn't have any pain at this time. She is now being admitted to telemetry for r/o acs and further management of her abdominal issues.    Patient reports that she had a colonoscopy 10 years ago and an EGD 5 years ago      PAST MEDICAL & SURGICAL HISTORY:  HLD (hyperlipidemia)  DM (diabetes mellitus)  HTN (hypertension)  No significant past surgical history      Allergies  No Known Allergies    MEDICATIONS  (STANDING):  amLODIPine   Tablet 10 milliGRAM(s) Oral daily  aspirin enteric coated 81 milliGRAM(s) Oral daily  cyanocobalamin 1000 MICROGram(s) Oral daily  dextrose 5%. 1000 milliLiter(s) (50 mL/Hr) IV Continuous <Continuous>  dextrose 50% Injectable 12.5 Gram(s) IV Push once  dextrose 50% Injectable 25 Gram(s) IV Push once  dextrose 50% Injectable 25 Gram(s) IV Push once  enoxaparin Injectable 40 milliGRAM(s) SubCutaneous daily  insulin lispro (HumaLOG) corrective regimen sliding scale   SubCutaneous three times a day before meals  insulin lispro (HumaLOG) corrective regimen sliding scale   SubCutaneous at bedtime  insulin lispro Injectable (HumaLOG) 6 Unit(s) SubCutaneous three times a day before meals  magnesium sulfate  IVPB 1 Gram(s) IV Intermittent once  metoprolol tartrate 50 milliGRAM(s) Oral three times a day  pantoprazole    Tablet 40 milliGRAM(s) Oral before breakfast  simvastatin 40 milliGRAM(s) Oral at bedtime    MEDICATIONS  (PRN):  acetaminophen   Tablet .. 650 milliGRAM(s) Oral every 6 hours PRN Temp greater or equal to 38C (100.4F), Mild Pain (1 - 3), Moderate Pain (4 - 6)  dextrose 40% Gel 15 Gram(s) Oral once PRN Blood Glucose LESS THAN 70 milliGRAM(s)/deciliter  glucagon  Injectable 1 milliGRAM(s) IntraMuscular once PRN Glucose LESS THAN 70 milligrams/deciliter      FAMILY HISTORY:  Family history of hypertension (Father)      Social History:  Marital Status:   Occupation: Homemaker  Tobacco Use: denies  ETOH Use: denies      Review of Systems:  Pertinent ROS as per HPI, otherwise negative  General:  No wt loss, fevers, chills, night sweats, fatigue,   CV:  No pain, palpitatioins, hypo/hypertension  Resp:  No dyspnea, cough, tachypnea, wheezing  GI:  as per HPI  :  No pain, bleeding, incontinence, nocturia  Muscle:  No pain, weakness  Neuro:  No weakness, tingling, memory problems  Psych:  No fatigue, insomnia, mood problems, depression  Endocrine:  No polyuria, cold/heat intolerance  Heme:  No petechiae, ecchymosis, easy bruisability  Skin:  No rash, tattoos, scars, edema      Vital Signs Last 24 Hrs  T(C): 36.7 (17 Oct 2020 13:14), Max: 36.7 (17 Oct 2020 02:32)  T(F): 98.1 (17 Oct 2020 13:14), Max: 98.1 (17 Oct 2020 02:32)  HR: 87 (17 Oct 2020 15:21) (72 - 88)  BP: 135/87 (17 Oct 2020 15:21) (130/81 - 155/89)  BP(mean): --  RR: 18 (17 Oct 2020 15:21) (17 - 20)  SpO2: 98% (17 Oct 2020 15:21) (97% - 99%)      PHYSICAL EXAM:  Constitutional: NAD, well-developed  HEENT: anicteric, EOMI  Neck: No LAD, supple  Cardiovascular: S1 and S2, RRR, no M  Respiratory: CTA and P  Gastrointestinal: BS+, soft, NT/ND, neg HSM,  Extremities: No peripheral edema, neg clubing, cyanosis  Vascular: 2+ peripheral pulses  Neurological: A/O x 3, no focal deficits  Psychiatric: Normal mood, normal affect  Skin: No rashes, normal turgor      LABS:                        12.6   8.63  )-----------( 257      ( 17 Oct 2020 08:27 )             40.2     10-    140  |  98  |  7   ----------------------------<  262<H>  3.3<L>   |  26  |  0.41<L>    Ca    9.2      17 Oct 2020 08:27  Phos  3.4     10-  Mg     1.3     10-    TPro  7.7  /  Alb  4.7  /  TBili  0.8  /  DBili  x   /  AST  75<H>  /  ALT  59<H>  /  AlkPhos  68  10-17    PT/INR - ( 16 Oct 2020 12:10 )   PT: 12.1 SEC;   INR: 1.05          PTT - ( 16 Oct 2020 12:10 )  PTT:30.1 SEC  Urinalysis Basic - ( 16 Oct 2020 22:22 )    Color: COLORLESS / Appearance: CLEAR / S.027 / pH: 7.5  Gluc: 30 / Ketone: NEGATIVE  / Bili: NEGATIVE / Urobili: NORMAL   Blood: NEGATIVE / Protein: 10 / Nitrite: NEGATIVE   Leuk Esterase: NEGATIVE / RBC: x / WBC x   Sq Epi: x / Non Sq Epi: x / Bacteria: x      LIVER FUNCTIONS - ( 17 Oct 2020 08:27 )  Alb: 4.7 g/dL / Pro: 7.7 g/dL / ALK PHOS: 68 u/L / ALT: 59 u/L / AST: 75 u/L / GGT: 35 u/L         RADIOLOGY & ADDITIONAL TESTS:

## 2020-10-17 NOTE — H&P ADULT - PROBLEM SELECTOR PLAN 3
monitor fs, insulin ssc, hgba1-c, hold po meds for now [place] Catheter placed by ED, consider doing a trial void

## 2020-10-17 NOTE — H&P ADULT - NSHPPHYSICALEXAM_GEN_ALL_CORE
Vital Signs Last 24 Hrs  T(C): 36.7 (17 Oct 2020 06:25), Max: 36.8 (16 Oct 2020 11:42)  T(F): 98 (17 Oct 2020 06:25), Max: 98.3 (16 Oct 2020 11:42)  HR: 87 (17 Oct 2020 06:25) (71 - 87)  BP: 132/82 (17 Oct 2020 06:25) (131/60 - 155/89)  BP(mean): --  RR: 18 (17 Oct 2020 06:25) (18 - 27)  SpO2: 99% (17 Oct 2020 06:25) (97% - 100%)    EKG: NSR @ 71, septal infarct

## 2020-10-17 NOTE — H&P ADULT - PROBLEM SELECTOR PLAN 2
CT abd/pelvis was done, [facility] c/s e-mailed for an evaluation, RUQ abd US ordered for further eval of the gallbladder, monitor LFT's and Lipase, also found to have an elevated lactate level, monitor

## 2020-10-17 NOTE — H&P ADULT - NEGATIVE OPHTHALMOLOGIC SYMPTOMS
no loss of vision L/no loss of vision R/no diplopia/no photophobia/no blurred vision L/no blurred vision R

## 2020-10-17 NOTE — PATIENT PROFILE ADULT - NSPRESCRALCFREQ_GEN_A_NUR
"Dialysis Note:        PCP:  Tiffani Cortes DO    Subjective:      Nick Kemp seen on dialysis    Nick Kemp feels well and denies any nausea, vomiting, shortness of breath or chest pain. Appetite is good. No cramping. BP reviewed on HD  Platelets lower today, no bleeding so far. Objective:  General:  Alert and in no acute distress. Visit Vitals  /56 (BP Location: LUE - Left upper extremity, Patient Position: Semi-Medina's)   Pulse 61   Temp 97.5 Â°F (36.4 Â°C) (Oral)   Resp 16   Ht 5' 1"" (1.549 m)   Wt 56.2 kg   SpO2 100%   BMI 23.41 kg/mÂ²     HEENT:  Mucous membranes moist, good dental hygiene. Lungs:  B/L good air entry is present. There are no crackles or wheeze appreciated. CVS:  S1 S2 present no murmur rubs appreciated. Abdomen:  Soft, nontender. Peripheral edema:  no edema present   Access Right AVf, in use    Assessment and Plan:      ESRD on Hemodialysis:     Pt. Stable no changes. Monitor for bleeding at AVF site post dialysis.       " Never

## 2020-10-17 NOTE — H&P ADULT - ASSESSMENT
71 y/o Yakut speaking female, with a PmHx of HTN, HLD, DM, presented to the [facility] ED c/o chest pain, abd pain and sob. Admitted to telemetry for r/o acs and for a rectal wall thickening r/o proctitis and an adenomyomatosis or fundal gallbladder wall calcification r/o porcelain gallbladder. She was also found to have an elevated Lipase and Lactate levels.

## 2020-10-17 NOTE — CONSULT NOTE ADULT - PROBLEM SELECTOR RECOMMENDATION 9
-Blood glucose target is 100 to 180  -Will start Lantus 18 units daily and Humalog 6 units QAC with moderate correction scale  -Encouraged carbohydrate consistent diet  -Discussed that we made need to discharge on insulin especially given elevated lipase and GI symptoms, we may need to avoid GLP-1 analogs and DDP-IV inhibitors. Patient is agreeable to insulin if needed  -Recommend outpatient follow up at our endocrine clinic in [place] tel [phone]

## 2020-10-17 NOTE — CONSULT NOTE ADULT - ASSESSMENT
70 y.o. female with h/o type 2 DM uncontrolled, HTN , hyperlipidemia presents with SOB, CP and abdominal pain. This is a high complexity patient with high level decision making.

## 2020-10-17 NOTE — H&P ADULT - HISTORY OF PRESENT ILLNESS
71 y/o Serbian speaking female, with a PmHx of HTN, HLD, DM, presented to the [facility] ED c/o chest pain, abd pain and sob. Pt states she has been having stomach pain for about 2-3 years and comes and goes. For the past week she states she has been having episodes of dizziness, emesis, SOB and a left sided, non-radiating 5-6/10, dull pressure like sensation in her chest. She states her symptoms have been intermittent but she wasn't getting any better so she came to the [facility] ED for an evaluation. She denies any fever, cough, chills, HA, blurred vision, recent travel. She states her  has been having a cough for the past week as well but has no fever. Pt denies any episodes of constipation or diarrhea and the pain in her abdomen is " above and below her stomach" as per the . In the ED, she had a CT abd/pelvis that was done that showed a rectal wall thickening r/o proctitis and an adenomyomatosis or fundal gallbladder wall calcification r/o porcelain gallbladder. She was also found to have an elevated Lipase and Lactate levels. She appears comfortable at this time and states she doesn't have any pain at this time. She is now being admitted to telemetry for r/o acs and further management of her abdominal issues.

## 2020-10-17 NOTE — H&P ADULT - RS GEN PE MLT RESP DETAILS PC
no chest wall tenderness/airway patent/breath sounds equal/clear to auscultation bilaterally/good air movement/respirations non-labored

## 2020-10-17 NOTE — ED ADULT NURSE REASSESSMENT NOTE - NS ED NURSE REASSESS COMMENT FT1
14fr indwelling seaman placed utilizing aseptic technique, pt tolerated well. Urine output 400cc, clear and yellow to be observed. Awaiting further plan of care

## 2020-10-17 NOTE — CONSULT NOTE ADULT - SUBJECTIVE AND OBJECTIVE BOX
HPI:  69 y/o Divehi speaking female, with a PmHx of HTN, HLD, DM, presented to the [facility] ED c/o chest pain, abd pain and sob. Pt states she has been having stomach pain for about 2-3 years and comes and goes. For the past week she states she has been having episodes of dizziness, emesis, SOB and a left sided, non-radiating 5-6/10, dull pressure like sensation in her chest. She states her symptoms have been intermittent but she wasn't getting any better so she came to the [facility] ED for an evaluation. She denies any fever, cough, chills, HA, blurred vision, recent travel. She states her  has been having a cough for the past week as well but has no fever. Pt denies any episodes of constipation or diarrhea and the pain in her abdomen is " above and below her stomach" as per the . In the ED, she had a CT abd/pelvis that was done that showed a rectal wall thickening r/o proctitis and an adenomyomatosis or fundal gallbladder wall calcification r/o porcelain gallbladder. She was also found to have an elevated Lipase and Lactate levels. She appears comfortable at this time and states she doesn't have any pain at this time. She is now being admitted to telemetry for r/o acs and further management of her abdominal issues.     (17 Oct 2020 08:22)    Endocrine history: Obtained via  ID# [id number] since patient is Divehi speaking  70 y.o. female with h/o uncontrolled type 2 DM diagnosed about 5 to 6 years ago presents with SOB, CP and abdominal pain. Does report eye disease and sees ophthalmology. She c/o LLQ abdominal pain. Also report needing to see podiatry. Not aware of kidney disease. At home takes glipizide 10 mg daily, Januvia 100 mg daily and metformin 1,000 mg BID. She checks FS daily and typically are 100s to 200s. No polyuria and no polydipsia.     Diet:  breakfast: roti  lunch: rice  dinner: roti          PAST MEDICAL & SURGICAL HISTORY:  HLD (hyperlipidemia)    DM (diabetes mellitus) type 2 DM    HTN (hypertension)    No significant past surgical history        FAMILY HISTORY:  Family history of hypertension: father        Social History:  No smoking or ETOH use    Outpatient Medications:  amLODIPine 10 mg oral tablet: 1 tab(s) orally once a day (17 Oct 2020 08:13)  aspirin 81 mg oral tablet: 1 tab(s) orally once a day (17 Oct 2020 08:13)  glipiZIDE 10 mg oral tablet: 1 tab(s) orally once a day (17 Oct 2020 08:13)  Januvia 100 mg oral tablet: 1 tab(s) orally once a day (17 Oct 2020 08:13)  Metoprolol Tartrate 50 mg oral tablet: 1 tab(s) orally 3 times a day (17 Oct 2020 08:13)  simvastatin 40 mg oral tablet: 1 tab(s) orally once a day (at bedtime) (17 Oct 2020 08:13)  Vitamin B12 1000 mcg oral tablet: 1 tab(s) orally once a day (17 Oct 2020 08:13)    MEDICATIONS  (STANDING):  amLODIPine   Tablet 10 milliGRAM(s) Oral daily  aspirin enteric coated 81 milliGRAM(s) Oral daily  cyanocobalamin 1000 MICROGram(s) Oral daily  dextrose 5%. 1000 milliLiter(s) (50 mL/Hr) IV Continuous <Continuous>  dextrose 50% Injectable 12.5 Gram(s) IV Push once  dextrose 50% Injectable 25 Gram(s) IV Push once  dextrose 50% Injectable 25 Gram(s) IV Push once  enoxaparin Injectable 40 milliGRAM(s) SubCutaneous daily  insulin glargine Injectable (LANTUS) 12 Unit(s) SubCutaneous once  insulin lispro (HumaLOG) corrective regimen sliding scale   SubCutaneous three times a day before meals  insulin lispro (HumaLOG) corrective regimen sliding scale   SubCutaneous at bedtime  magnesium sulfate  IVPB 1 Gram(s) IV Intermittent once  metoprolol tartrate 50 milliGRAM(s) Oral three times a day  pantoprazole    Tablet 40 milliGRAM(s) Oral before breakfast  potassium chloride   Powder 40 milliEquivalent(s) Oral every 4 hours  simvastatin 40 milliGRAM(s) Oral at bedtime    MEDICATIONS  (PRN):  acetaminophen   Tablet .. 650 milliGRAM(s) Oral every 6 hours PRN Temp greater or equal to 38C (100.4F), Mild Pain (1 - 3), Moderate Pain (4 - 6)  dextrose 40% Gel 15 Gram(s) Oral once PRN Blood Glucose LESS THAN 70 milliGRAM(s)/deciliter  glucagon  Injectable 1 milliGRAM(s) IntraMuscular once PRN Glucose LESS THAN 70 milligrams/deciliter      Allergies    No Known Allergies    Intolerances      Review of Systems:  Constitutional: No fever  Eyes: reports blurry vision  Neuro: No tremors  HEENT: No pain  Cardiovascular: No chest pain, palpitations  Respiratory: No SOB, no cough  GI: No nausea, vomiting, reports abdominal pain  : No dysuria  Skin: no rash  Psych: no depression  Endocrine: no polyuria, polydipsia  Hem/lymph: no swelling    ALL OTHER SYSTEMS REVIEWED AND NEGATIVE      PHYSICAL EXAM:  VITALS: T(C): 36.7 (10-17-20 @ 13:14)  T(F): 98.1 (10-17-20 @ 13:14), Max: 98.1 (10-17-20 @ 02:32)  HR: 88 (10-17-20 @ 13:14) (72 - 88)  BP: 130/81 (10-17-20 @ 13:14) (130/81 - 155/89)  RR:  (17 - 27)  SpO2:  (97% - 99%)  Wt(kg): --  GENERAL: NAD, well-groomed, well-developed  EYES: No proptosis, no lid lag, anicteric  HEENT:  Atraumatic, Normocephalic, moist mucous membranes  THYROID: Normal size, no palpable nodules  RESPIRATORY: Clear to auscultation bilaterally; No rales, rhonchi, wheezing  CARDIOVASCULAR: Regular rate and rhythm; no peripheral edema  GI: Soft, tender in the epigastric and LLQ, non distended, normal bowel sounds  SKIN: Dry feet, intact, No rashes or lesions  MUSCULOSKELETAL: Full range of motion, normal strength  NEURO: extraocular movements intact, no tremor  PSYCH: Alert and oriented x 3, normal affect, normal mood  CUSHING'S SIGNS: no striae    POCT Blood Glucose.: 407 mg/dL (10-17-20 @ 12:29)  POCT Blood Glucose.: 246 mg/dL (10-17-20 @ 08:28)  POCT Blood Glucose.: 370 mg/dL (10-16-20 @ 11:47)                            12.6   8.63  )-----------( 257      ( 17 Oct 2020 08:27 )             40.2       10-17    140  |  98  |  7   ----------------------------<  262<H>  3.3<L>   |  26  |  0.41<L>    EGFR if : 121  EGFR if non : 104    Ca    9.2      10-17  Mg     1.3     10-17  Phos  3.4     10-17    TPro  7.7  /  Alb  4.7  /  TBili  0.8  /  DBili  x   /  AST  75<H>  /  ALT  59<H>  /  AlkPhos  68  10-17      Thyroid Function Tests:  10-17 @ 08:27 TSH 3.86 FreeT4 -- T3 -- Anti TPO -- Anti Thyroglobulin Ab -- TSI --          10-17 Chol 131 LDL 66 HDL 53 Trig 138    Radiology:

## 2020-10-17 NOTE — H&P ADULT - VASCULAR
Will recommend to discontinue metronidazole.  Clindamycin prescribed.  Follow-up if symptoms persist or worsen.    Dr Dow       Equal and normal pulses (carotid, femoral, dorsalis pedis)

## 2020-10-17 NOTE — PATIENT PROFILE ADULT - COMPLETE THE FOLLOWING IF THE PATIENT REFUSES THE INFLUENZA VACCINE:
Patient:   RANGEL CANO            MRN: [id number]            FIN: [id number]               Age:   54 years     Sex:  FEMALE     :  60   Associated Diagnoses:   Polyp of transverse colon   Author:   DONY SUTTON      Pre-Procedure   Procedure Date:  2017 .    Procedure Type:  Colonoscopy with removal of tumor(s), polyp(s), or other lesion(s) by cold biopsy.     Procedure provider:  Performed by DONY SUTTON.    Referred by:  JOHN WESTBROOK    Current History and Physical:  Documented on chart, Reviewed, Performed prior to procedure.    Family History:     MOTHER  CA - Cancer of colon  FATHER  CA - Cancer of colon: negative  GRANDMOTHER  CA - Cancer of colon: negative  GRANDFATHER  CA - Cancer of colon: negative  SISTER  CA - Cancer of colon: negative  BROTHER  CA - Cancer of colon: negative  .    Procedure History:     thumb surgery.  ovaries removed.  knee surgery..    Informed Consent:  After discussing the rationale, risks and benefits, and alternatives to this procedure, the patient provided signed consent for the procedure, After discussing the rationale, risks, and benefits, and alternatives to this procedure, the patient provided signed consent for this procedure. The procedure was explained to the patient and/or legal guardian including polyp miss rate and its potential complication ie, bleeding, perforation, possible need for blood transfusion and surgery. .    Preoperative Diagnosis / Indication     Surveillance: last colonoscopy was in , history of colon polyps.     Colorectal Neoplasm Risk Assessment:       High risk: 3 or more adenomas.    Medications:   (Selected)   Documented Medications  Documented  losartan oral 100 mg tablet: 100 mg, 1 tab, Oral, Daily  zolpidem oral 10 mg tablet: 10 mg, 1 tab, Oral, Q Bedtime, PRN: for sleep.    ASA Classification:  Class I.    Monitoring:  See anesthesia record.       Procedure   The procedure was  performed in the ambulatory surgery center.  The patient was not sedated.  Rectal exam was performed and was normal with no masses palpated, with no gross blood.  The patient was positioned starting in the left lateral decubitus position.  Endoscope type used was a pediatric-size, colonoscope, Olympus.  The endoscope was lubricated then introduced through the anus.  The scope was advanced to the terminal ileum over a period of 6 minutes, with the ileum intubated 12 cm, with photodocumentation of the terminal ileum, with photodocumentation of the ileocecal valve, with photodocumentation of the appendiceal orifice.  No difficulties encountered during the procedure.  The bowel was carefully examined as the scope was withdrawn 14 minutes after visualizing the cecum.  Rectal retroflexion was performed.  Bowel preparation quality was excellent, was adequate for the detection of polyps larger than 5mm, right colon BBPS was 3 - seen well; no staining stool, middle colon BBPS was 3 - seen well; no staining stool, left colon BBPS was 3 - seen well; no staining stool and total [place] Bowel Preparation Scale was 9 /9.  The patient tolerated the procedure well.     Findings   Internal hemorrhoids were identified.  The severity is described as moderate.  A single polyp 6 mm in size was noted.  The polyp was located in the transverse colon.  The polyp(s) is sessile (0-Is).  Intervention(s) included polypectomy with cold forceps.  Intervention was successful.     Images   Procedure images:         [id number]   [id number]   [id number]     [id number]   [id number].jpg   [id number].jpg     [id number].jpg   .       Post-Procedure   Complications during procedure:  None.    Estimated blood loss:  None.    Blood administered:  No.    Specimens:  To pathology.    Grafts/implants:  None.    Assistants:   None.       Impression and Plan   Colonoscopy:       Diagnosis: Polyp of transverse colon (VZP74-CX D12.3, Diagnosis, Hospitalized, Medical), K 64.8 Hemorrhoids.    Recommendations     Repeat colonoscopy in 5 years.     Follow up with your primary physician as usual.     Maintain a diet that is high in fiber.     Continue current medications.     Return to normal activities after 24 hours.     You received medications for sedation during this procedure: fentanyl, midazolam.     Education and Follow-up:       Counseled: Patient, Family.             Electronically Signed On 09/11/2017 08:13  __________________________________________________   DONY SUTTON    CHECK ONBASE FOR ATTACHMENT: GI Procedure Note    CHECK ONBASE FOR ATTACHMENT: GI Procedure Note    CHECK ONBASE FOR ATTACHMENT: GI Procedure Note    CHECK ONBASE FOR ATTACHMENT: GI Procedure Note    CHECK ONBASE FOR ATTACHMENT: GI Procedure Note    CHECK ONBASE FOR ATTACHMENT: GI Procedure Note    CHECK ONBASE FOR ATTACHMENT: GI Procedure Note     Risks/benefits discussed with patient/surrogate

## 2020-10-17 NOTE — CONSULT NOTE ADULT - ASSESSMENT
69 yo Kyrgyz-speaking female with DM, HTN, HLD presenting with chest pain and abdominal pain.    1.  Abnormal CT with rectal wall thickening.  May represent colitis (infectious, inflammatory), neoplasm, or underdistention of colon.  2.  Gallbladder adenomyomatosis vs fundal wall calcification.  3.  Acute on chronic abdominal pain.  Differential includes gastritis, functional abdominal pain.  NM Gastric emptying study and CT in May 2019 unremarkable.  EGD in March 2018 with esophagitis and gastritis.  4.  Diabetes mellitus (Hb A1C: 9.3).    Recs:  - Monitor CBC, CMP.  - Diet as tolerated.  - Continue PPI.  - Follow up abdominal US.  Consider surgery evaluation if calcification confirmed.  - Pending clinical course, can consider inpatient vs outpatient colonoscopy.

## 2020-10-17 NOTE — H&P ADULT - NEGATIVE CARDIOVASCULAR SYMPTOMS
no palpitations/no orthopnea/no paroxysmal nocturnal dyspnea/no dyspnea on exertion/no peripheral edema

## 2020-10-18 DIAGNOSIS — E11.65 TYPE 2 DIABETES MELLITUS WITH HYPERGLYCEMIA: ICD-10-CM

## 2020-10-18 LAB
ALBUMIN SERPL ELPH-MCNC: 4 G/DL — SIGNIFICANT CHANGE UP (ref 3.3–5)
ALP SERPL-CCNC: 58 U/L — SIGNIFICANT CHANGE UP (ref 40–120)
ALT FLD-CCNC: 46 U/L — HIGH (ref 4–33)
ANION GAP SERPL CALC-SCNC: 13 MMO/L — SIGNIFICANT CHANGE UP (ref 7–14)
AST SERPL-CCNC: 60 U/L — HIGH (ref 4–32)
BASOPHILS # BLD AUTO: 0.06 K/UL — SIGNIFICANT CHANGE UP (ref 0–0.2)
BASOPHILS NFR BLD AUTO: 0.8 % — SIGNIFICANT CHANGE UP (ref 0–2)
BILIRUB SERPL-MCNC: 0.6 MG/DL — SIGNIFICANT CHANGE UP (ref 0.2–1.2)
BUN SERPL-MCNC: 11 MG/DL — SIGNIFICANT CHANGE UP (ref 7–23)
CALCIUM SERPL-MCNC: 9 MG/DL — SIGNIFICANT CHANGE UP (ref 8.4–10.5)
CHLORIDE SERPL-SCNC: 103 MMOL/L — SIGNIFICANT CHANGE UP (ref 98–107)
CO2 SERPL-SCNC: 24 MMOL/L — SIGNIFICANT CHANGE UP (ref 22–31)
CREAT SERPL-MCNC: 0.44 MG/DL — LOW (ref 0.5–1.3)
EOSINOPHIL # BLD AUTO: 0.32 K/UL — SIGNIFICANT CHANGE UP (ref 0–0.5)
EOSINOPHIL NFR BLD AUTO: 4.5 % — SIGNIFICANT CHANGE UP (ref 0–6)
GLUCOSE SERPL-MCNC: 150 MG/DL — HIGH (ref 70–99)
HCT VFR BLD CALC: 37.4 % — SIGNIFICANT CHANGE UP (ref 34.5–45)
HGB BLD-MCNC: 11.7 G/DL — SIGNIFICANT CHANGE UP (ref 11.5–15.5)
IMM GRANULOCYTES NFR BLD AUTO: 0.1 % — SIGNIFICANT CHANGE UP (ref 0–1.5)
LACTATE SERPL-SCNC: 2.7 MMOL/L — HIGH (ref 0.5–2)
LYMPHOCYTES # BLD AUTO: 3.03 K/UL — SIGNIFICANT CHANGE UP (ref 1–3.3)
LYMPHOCYTES # BLD AUTO: 42.4 % — SIGNIFICANT CHANGE UP (ref 13–44)
MCHC RBC-ENTMCNC: 24.1 PG — LOW (ref 27–34)
MCHC RBC-ENTMCNC: 31.3 % — LOW (ref 32–36)
MCV RBC AUTO: 77 FL — LOW (ref 80–100)
MONOCYTES # BLD AUTO: 0.54 K/UL — SIGNIFICANT CHANGE UP (ref 0–0.9)
MONOCYTES NFR BLD AUTO: 7.6 % — SIGNIFICANT CHANGE UP (ref 2–14)
NEUTROPHILS # BLD AUTO: 3.18 K/UL — SIGNIFICANT CHANGE UP (ref 1.8–7.4)
NEUTROPHILS NFR BLD AUTO: 44.6 % — SIGNIFICANT CHANGE UP (ref 43–77)
NRBC # FLD: 0 K/UL — SIGNIFICANT CHANGE UP (ref 0–0)
PLATELET # BLD AUTO: 238 K/UL — SIGNIFICANT CHANGE UP (ref 150–400)
PMV BLD: 12.4 FL — SIGNIFICANT CHANGE UP (ref 7–13)
POTASSIUM SERPL-MCNC: 3.4 MMOL/L — LOW (ref 3.5–5.3)
POTASSIUM SERPL-SCNC: 3.4 MMOL/L — LOW (ref 3.5–5.3)
PROT SERPL-MCNC: 6.5 G/DL — SIGNIFICANT CHANGE UP (ref 6–8.3)
RBC # BLD: 4.86 M/UL — SIGNIFICANT CHANGE UP (ref 3.8–5.2)
RBC # FLD: 14.8 % — HIGH (ref 10.3–14.5)
SODIUM SERPL-SCNC: 140 MMOL/L — SIGNIFICANT CHANGE UP (ref 135–145)
WBC # BLD: 7.14 K/UL — SIGNIFICANT CHANGE UP (ref 3.8–10.5)
WBC # FLD AUTO: 7.14 K/UL — SIGNIFICANT CHANGE UP (ref 3.8–10.5)

## 2020-10-18 PROCEDURE — 93306 TTE W/DOPPLER COMPLETE: CPT | Mod: 26

## 2020-10-18 PROCEDURE — 76705 ECHO EXAM OF ABDOMEN: CPT | Mod: 26

## 2020-10-18 PROCEDURE — 99233 SBSQ HOSP IP/OBS HIGH 50: CPT

## 2020-10-18 PROCEDURE — 99232 SBSQ HOSP IP/OBS MODERATE 35: CPT

## 2020-10-18 RX ORDER — INSULIN GLARGINE 100 [IU]/ML
24 INJECTION, SOLUTION SUBCUTANEOUS AT BEDTIME
Refills: 0 | Status: DISCONTINUED | OUTPATIENT
Start: 2020-10-18 | End: 2020-10-18

## 2020-10-18 RX ORDER — INSULIN GLARGINE 100 [IU]/ML
18 INJECTION, SOLUTION SUBCUTANEOUS AT BEDTIME
Refills: 0 | Status: DISCONTINUED | OUTPATIENT
Start: 2020-10-18 | End: 2020-10-18

## 2020-10-18 RX ORDER — INSULIN GLARGINE 100 [IU]/ML
20 INJECTION, SOLUTION SUBCUTANEOUS
Refills: 0 | Status: DISCONTINUED | OUTPATIENT
Start: 2020-10-18 | End: 2020-10-19

## 2020-10-18 RX ORDER — INSULIN LISPRO 100/ML
9 VIAL (ML) SUBCUTANEOUS
Refills: 0 | Status: DISCONTINUED | OUTPATIENT
Start: 2020-10-18 | End: 2020-10-18

## 2020-10-18 RX ORDER — INSULIN GLARGINE 100 [IU]/ML
22 INJECTION, SOLUTION SUBCUTANEOUS AT BEDTIME
Refills: 0 | Status: DISCONTINUED | OUTPATIENT
Start: 2020-10-18 | End: 2020-10-18

## 2020-10-18 RX ORDER — POTASSIUM CHLORIDE 20 MEQ
40 PACKET (EA) ORAL ONCE
Refills: 0 | Status: COMPLETED | OUTPATIENT
Start: 2020-10-18 | End: 2020-10-18

## 2020-10-18 RX ORDER — SIMETHICONE 80 MG/1
80 TABLET, CHEWABLE ORAL ONCE
Refills: 0 | Status: COMPLETED | OUTPATIENT
Start: 2020-10-18 | End: 2020-10-18

## 2020-10-18 RX ORDER — INSULIN LISPRO 100/ML
9 VIAL (ML) SUBCUTANEOUS
Refills: 0 | Status: DISCONTINUED | OUTPATIENT
Start: 2020-10-18 | End: 2020-10-19

## 2020-10-18 RX ADMIN — PANTOPRAZOLE SODIUM 40 MILLIGRAM(S): 20 TABLET, DELAYED RELEASE ORAL at 05:27

## 2020-10-18 RX ADMIN — Medication 50 MILLIGRAM(S): at 20:40

## 2020-10-18 RX ADMIN — Medication 40 MILLIEQUIVALENT(S): at 12:51

## 2020-10-18 RX ADMIN — Medication 6 UNIT(S): at 09:45

## 2020-10-18 RX ADMIN — Medication 50 MILLIGRAM(S): at 12:51

## 2020-10-18 RX ADMIN — ENOXAPARIN SODIUM 40 MILLIGRAM(S): 100 INJECTION SUBCUTANEOUS at 12:51

## 2020-10-18 RX ADMIN — Medication 50 MILLIGRAM(S): at 05:27

## 2020-10-18 RX ADMIN — INSULIN GLARGINE 20 UNIT(S): 100 INJECTION, SOLUTION SUBCUTANEOUS at 18:10

## 2020-10-18 RX ADMIN — Medication 6: at 12:52

## 2020-10-18 RX ADMIN — Medication 6: at 18:09

## 2020-10-18 RX ADMIN — Medication 81 MILLIGRAM(S): at 12:51

## 2020-10-18 RX ADMIN — PREGABALIN 1000 MICROGRAM(S): 225 CAPSULE ORAL at 12:51

## 2020-10-18 RX ADMIN — SIMVASTATIN 40 MILLIGRAM(S): 20 TABLET, FILM COATED ORAL at 20:40

## 2020-10-18 RX ADMIN — Medication 6 UNIT(S): at 12:52

## 2020-10-18 RX ADMIN — AMLODIPINE BESYLATE 10 MILLIGRAM(S): 2.5 TABLET ORAL at 05:27

## 2020-10-18 RX ADMIN — SIMETHICONE 80 MILLIGRAM(S): 80 TABLET, CHEWABLE ORAL at 20:40

## 2020-10-18 RX ADMIN — Medication 4: at 09:44

## 2020-10-18 RX ADMIN — Medication 9 UNIT(S): at 18:09

## 2020-10-18 NOTE — PROGRESS NOTE ADULT - PROBLEM SELECTOR PLAN 3
A1c 9.3%  Endocrine following  Increase Lantus to 24u qHS, Humalog 9 units qac  Lactate level likely elevated in the setting of metformin 1000mg TID  Continue with FS qac and qHS  Moderate dose ISS  Holding home oral diabetic medications   Likely will need insulin upon discharge as DM is not controlled on 3 PO meds at home- patient amenable

## 2020-10-18 NOTE — PROGRESS NOTE ADULT - SUBJECTIVE AND OBJECTIVE BOX
Celia Decker  Division of Hospital Medicine  Pager #[phone]    Patient is a 70y old  Female who presents with a chief complaint of Chest Pain (17 Oct 2020 17:46)      SUBJECTIVE / OVERNIGHT EVENTS: Patient seen and examined at bedside. [place]  for [place] used. Patient denies CP or abdominal pain. Is amenable to using insulin at home if needed.    ADDITIONAL REVIEW OF SYSTEMS:    MEDICATIONS  (STANDING):  amLODIPine   Tablet 10 milliGRAM(s) Oral daily  aspirin enteric coated 81 milliGRAM(s) Oral daily  cyanocobalamin 1000 MICROGram(s) Oral daily  dextrose 5%. 1000 milliLiter(s) (50 mL/Hr) IV Continuous <Continuous>  dextrose 50% Injectable 12.5 Gram(s) IV Push once  dextrose 50% Injectable 25 Gram(s) IV Push once  dextrose 50% Injectable 25 Gram(s) IV Push once  enoxaparin Injectable 40 milliGRAM(s) SubCutaneous daily  insulin glargine Injectable (LANTUS) 24 Unit(s) SubCutaneous at bedtime  insulin lispro (HumaLOG) corrective regimen sliding scale   SubCutaneous three times a day before meals  insulin lispro (HumaLOG) corrective regimen sliding scale   SubCutaneous at bedtime  insulin lispro Injectable (HumaLOG) 9 Unit(s) SubCutaneous three times a day before meals  metoprolol tartrate 50 milliGRAM(s) Oral three times a day  pantoprazole    Tablet 40 milliGRAM(s) Oral before breakfast  simvastatin 40 milliGRAM(s) Oral at bedtime    MEDICATIONS  (PRN):  acetaminophen   Tablet .. 650 milliGRAM(s) Oral every 6 hours PRN Temp greater or equal to 38C (100.4F), Mild Pain (1 - 3), Moderate Pain (4 - 6)  dextrose 40% Gel 15 Gram(s) Oral once PRN Blood Glucose LESS THAN 70 milliGRAM(s)/deciliter  glucagon  Injectable 1 milliGRAM(s) IntraMuscular once PRN Glucose LESS THAN 70 milligrams/deciliter      CAPILLARY BLOOD GLUCOSE      POCT Blood Glucose.: 252 mg/dL (18 Oct 2020 12:21)  POCT Blood Glucose.: 214 mg/dL (18 Oct 2020 08:55)  POCT Blood Glucose.: 239 mg/dL (17 Oct 2020 21:50)  POCT Blood Glucose.: 239 mg/dL (17 Oct 2020 17:28)    I&O's Summary    17 Oct 2020 07:01  -  18 Oct 2020 07:00  --------------------------------------------------------  IN: 200 mL / OUT: 1350 mL / NET: -1150 mL        PHYSICAL EXAM:    Vital Signs Last 24 Hrs  T(C): 36.4 (18 Oct 2020 12:47), Max: 36.7 (17 Oct 2020 22:12)  T(F): 97.6 (18 Oct 2020 12:47), Max: 98 (17 Oct 2020 22:12)  HR: 69 (18 Oct 2020 12:47) (61 - 87)  BP: 112/68 (18 Oct 2020 12:47) (112/68 - 139/67)  BP(mean): --  RR: 17 (18 Oct 2020 12:47) (17 - 18)  SpO2: 98% (18 Oct 2020 12:47) (98% - 98%)    CONSTITUTIONAL: NAD, well-developed, well-groomed  EYES: Conjunctiva and sclera clear  RESPIRATORY: Normal respiratory effort; lungs are clear to auscultation bilaterally  CARDIOVASCULAR: Regular rate and rhythm, normal S1 and S2, no murmur/rub/gallop; no LE edema  ABDOMEN: Nontender to palpation, normoactive bowel sounds, no rebound/guarding  MUSCULOSKELETAL: No clubbing or cyanosis of digits  PSYCH: A+O to person, place, and time; affect appropriate  NEUROLOGY: No focal deficits  SKIN: No rashes; no palpable lesions    LABS:                        11.7   7.14  )-----------( 238      ( 18 Oct 2020 06:25 )             37.4     10-18    140  |  103  |  11  ----------------------------<  150<H>  3.4<L>   |  24  |  0.44<L>    Ca    9.0      18 Oct 2020 06:25  Phos  3.4     10-17  Mg     1.3     10-17    TPro  6.5  /  Alb  4.0  /  TBili  0.6  /  DBili  x   /  AST  60<H>  /  ALT  46<H>  /  AlkPhos  58  10-18      Urinalysis Basic - ( 16 Oct 2020 22:22 )    Color: COLORLESS / Appearance: CLEAR / S.027 / pH: 7.5  Gluc: 30 / Ketone: NEGATIVE  / Bili: NEGATIVE / Urobili: NORMAL   Blood: NEGATIVE / Protein: 10 / Nitrite: NEGATIVE   Leuk Esterase: NEGATIVE / RBC: x / WBC x   Sq Epi: x / Non Sq Epi: x / Bacteria: x      RADIOLOGY & ADDITIONAL TESTS:     < from: US Abdomen Limited (10.18.20 @ 11:18) >  FINDINGS:    Liver: Diffuse fatty infiltration.  Bile ducts: Normal caliber. Common bile duct measures 3 mm.  Gallbladder: Within normal limits.  Pancreas: Visualized portions are within normal limits.  Right kidney: 8.6 cm. No hydronephrosis.  Ascites: None.  IVC: Visualized portions are within normal limits.    IMPRESSION:    Diffuse fatty infiltration of the liver.    Normal sonographic appearance of the gallbladder.    TTE 10/18:    CONCLUSIONS:  1. Normal left ventricular internal dimensions and wall  thicknesses.  2. Normal left ventricular systolic function. No segmental  wall motion abnormalities.  3. Normal right ventricular size and function.  *** No previous Echo exam.    Results Reviewed: Yes  Imaging Personally Reviewed:  Electrocardiogram Personally Reviewed:    COORDINATION OF CARE:  Care Discussed with Consultants/Other Providers [Y/N]:  Prior or Outpatient Records Reviewed [Y/N]:

## 2020-10-18 NOTE — PROGRESS NOTE ADULT - ASSESSMENT
70 y.o. female with h/o type 2 DM uncontrolled, HTN , hyperlipidemia presents with SOB, CP and abdominal pain. Endocrine consulted for uncontrolled DM 2 (HbA1c 9.3%)    1. Uncontrolled type 2 diabetes mellitus with hyperglycemia  -HbA1c uncontrolled at 9.3%, patient is on oral agents only at home (Metformin, Glipizide, Januvia), also endorsing high carb diet. Goal A1c approximately 7%   -Inpatient BG target 100-180 mg/dl  -Note fasting glucose was stable on serum, then FS elevated in setting of eating prior. Total correction in last 24h = 14 units   -Lantus was increased by primary team to 24 units SQ qHS. Would recommend giving Lantus 20 units given serum glucose result of 150 mg/dl. Also Lantus was given at 4 PM yesterday - can give later, at 6 PM tonight, but would not wait till bedtime (patient with glucose in 400s yesterday without basal insulin)  -Agree with increase of Humalog to 9 units SQ TID before meals (Hold if not eating)   -Continue Humalog moderate dose correctional scale SQ TID before meals and Humalog moderate dose bedtime correctional scale SQ qHS   -Consistent carbohydrate diet, recommend RD consultation  -Check BG before meals and bedtime  -DM education: Starting insulin was discussed with patient. Patient will likely need at least basal insulin at discharge. Please start insulin pen and glucometer education. Will place provider to RN order    Discharge Plan:  -Basal/oral vs basal/bolus depending on inpatient requirements   -Given elevated lipase and GI symptoms, we may need to avoid GLP-1 analogs and DDP-IV inhibitors.   -Ensure patient has supplies including glucometer, glucose test strips, lancets, alcohol swabs and insulin PEN needles  -Patient with Medicaid insurance - recommend followup with [facility] at [place], Endocrine Clinic: [phone] [facility] [address], [phone]    2. Essential hypertension  -BP goal is <130/80  -Management as per primary team.     3. Other hyperlipidemia  -LDL target less than 70  -Currently on Simvastatin 40 mg daily. Recommend switching to Atorvastatin as patient is concurrently on Amlodipine for BP    Sharon Cole  Nurse Practitioner  Division of Endocrinology & Diabetes  In house pager #[phone]/long range pager #[phone]    If before 9AM or after 6PM, or on weekends/holidays, please call endocrine answering service for assistance ([phone]).  For nonurgent matters email [email or web address] for assistance. 70 y.o. female with h/o type 2 DM uncontrolled, HTN , hyperlipidemia presents with SOB, CP and abdominal pain. Endocrine consulted for uncontrolled DM 2 (HbA1c 9.3%)    1. Uncontrolled type 2 diabetes mellitus with hyperglycemia  -HbA1c uncontrolled at 9.3%, patient is on oral agents only at home (Metformin, Glipizide, Januvia), also endorsing high carb diet. Goal A1c approximately 7%   -Inpatient BG target 100-180 mg/dl  -Note fasting glucose was stable on serum, then FS elevated in setting of eating prior. Total correction in last 24h = 14 units   -Lantus was increased by primary team to 24 units SQ qHS. Would recommend only increasing slightly given serum glucose result of 150 mg/dl. Suggest Lantus 20 units. Also Lantus was given at 4 PM yesterday - can give later, at 6 PM tonight, but would not wait till bedtime (patient with glucose in 400s yesterday without basal insulin with slight elevation in AG and BHB)  -Agree with increase of Humalog to 9 units SQ TID before meals (Hold if not eating)   -Continue Humalog moderate dose correctional scale SQ TID before meals and Humalog moderate dose bedtime correctional scale SQ qHS   -Consistent carbohydrate diet, recommend RD consultation  -Check BG before meals and bedtime  -DM education: Starting insulin was discussed with patient. Patient will likely need at least basal insulin at discharge. Please start insulin pen and glucometer education. Will place provider to RN order    Discharge Plan:  -Basal/oral vs basal/bolus depending on inpatient requirements   -Given elevated lipase and GI symptoms, we may need to avoid GLP-1 analogs and DDP-IV inhibitors.   -Ensure patient has supplies including glucometer, glucose test strips, lancets, alcohol swabs and insulin PEN needles  -Patient with Medicaid insurance - recommend followup with [facility] at [place], Endocrine Clinic: [phone] [facility] [address], [phone]    2. Essential hypertension  -BP goal is <130/80  -Management as per primary team.     3. Other hyperlipidemia  -LDL target less than 70  -Currently on Simvastatin 40 mg daily. Recommend switching to Atorvastatin as patient is concurrently on Amlodipine for BP    Sharon Cole  Nurse Practitioner  Division of Endocrinology & Diabetes  In house pager #[phone]/long range pager #[phone]    If before 9AM or after 6PM, or on weekends/holidays, please call endocrine answering service for assistance ([phone]).  For nonurgent matters email [email or web address] for assistance.

## 2020-10-18 NOTE — PROGRESS NOTE ADULT - SUBJECTIVE AND OBJECTIVE BOX
Chief Complaint: DM 2 with hyperglycemia     History: Patient seen at bedside. Communicated via Mobile [employer] ID # [id number] to Japanese  Patient noted with multiple snacks and outside food on bedside table - half orange, several apples, food from home with rice. Patient endorses eating white rice PRIOR to FS assessment this AM.   Reports having cereal, eggs and bread for breakfast, + discomfort on left abdomen, no nausea/vomiting, no s/s of hypoglycemia  Most recent  mg/dl     MEDICATIONS  (STANDING):  amLODIPine   Tablet 10 milliGRAM(s) Oral daily  aspirin enteric coated 81 milliGRAM(s) Oral daily  cyanocobalamin 1000 MICROGram(s) Oral daily  dextrose 5%. 1000 milliLiter(s) (50 mL/Hr) IV Continuous <Continuous>  dextrose 50% Injectable 12.5 Gram(s) IV Push once  dextrose 50% Injectable 25 Gram(s) IV Push once  dextrose 50% Injectable 25 Gram(s) IV Push once  enoxaparin Injectable 40 milliGRAM(s) SubCutaneous daily  insulin glargine Injectable (LANTUS) 24 Unit(s) SubCutaneous at bedtime  insulin lispro (HumaLOG) corrective regimen sliding scale   SubCutaneous three times a day before meals  insulin lispro (HumaLOG) corrective regimen sliding scale   SubCutaneous at bedtime  insulin lispro Injectable (HumaLOG) 9 Unit(s) SubCutaneous three times a day before meals  metoprolol tartrate 50 milliGRAM(s) Oral three times a day  pantoprazole    Tablet 40 milliGRAM(s) Oral before breakfast  simvastatin 40 milliGRAM(s) Oral at bedtime    MEDICATIONS  (PRN):  acetaminophen   Tablet .. 650 milliGRAM(s) Oral every 6 hours PRN Temp greater or equal to 38C (100.4F), Mild Pain (1 - 3), Moderate Pain (4 - 6)  dextrose 40% Gel 15 Gram(s) Oral once PRN Blood Glucose LESS THAN 70 milliGRAM(s)/deciliter  glucagon  Injectable 1 milliGRAM(s) IntraMuscular once PRN Glucose LESS THAN 70 milligrams/deciliter    No Known Allergies    Review of Systems:  HEENT: No pain  Cardiovascular: No chest pain  Respiratory: No SOB  GI: +left sided abdominal pain. No nausea, vomiting    PHYSICAL EXAM:  VITALS: T(C): 36.4 (10-18-20 @ 12:47)  T(F): 97.6 (10-18-20 @ 12:47), Max: 98 (10-17-20 @ 22:12)  HR: 69 (10-18-20 @ 12:47) (61 - 87)  BP: 112/68 (10-18-20 @ 12:47) (112/68 - 139/67)  RR:  (17 - 18)  SpO2:  (98% - 98%)  Wt(kg): --  GENERAL: NAD  EYES: No proptosis, no lid lag, anicteric  HEENT:  Atraumatic, Normocephalic, moist mucous membranes  RESPIRATORY: unlabored respirations   PSYCH: Alert and oriented x 3, normal affect, normal mood    CAPILLARY BLOOD GLUCOSE    POCT Blood Glucose.: 252 mg/dL (18 Oct 2020 12:21)  POCT Blood Glucose.: 214 mg/dL (18 Oct 2020 08:55)  POCT Blood Glucose.: 239 mg/dL (17 Oct 2020 21:50)  POCT Blood Glucose.: 239 mg/dL (17 Oct 2020 17:28)      10-18    140  |  103  |  11  ----------------------------<  150<H>  3.4<L>   |  24  |  0.44<L>    EGFR if : 118  EGFR if non : 102    Ca    9.0      10-18  Mg     1.3     10-17  Phos  3.4     10-17    TPro  6.5  /  Alb  4.0  /  TBili  0.6  /  DBili  x   /  AST  60<H>  /  ALT  46<H>  /  AlkPhos  58  10-18      Thyroid Function Tests:  10-17 @ 08:27 TSH 3.86 FreeT4 -- T3 -- Anti TPO -- Anti Thyroglobulin Ab -- TSI --      A1C with Estimated Average Glucose: 9.3 % (10-17-20 @ 08:27)

## 2020-10-18 NOTE — PROGRESS NOTE ADULT - PROBLEM SELECTOR PLAN 2
Likely 2/2 uncontrolled DM  CT abd/pelvis with findings of adenomyomatosis or fundal gallbladder wall calcification  GI following  US abd with normal appearance of GB  AST/ALT elevated, likely 2/2 diffuse hepatic steatosis seen on US abd

## 2020-10-18 NOTE — PROGRESS NOTE ADULT - ASSESSMENT
71 y/o Upper sorbian speaking female, with a PmHx of HTN, HLD, DM, presented to the [facility] ED c/o chest pain, abd pain and sob. Admitted to telemetry for r/o acs and for a rectal wall thickening r/o proctitis and an adenomyomatosis or fundal gallbladder wall calcification r/o porcelain gallbladder. She was also found to have an elevated Lipase and Lactate levels.

## 2020-10-18 NOTE — PROGRESS NOTE ADULT - PROBLEM SELECTOR PLAN 1
Resolved.   -Troponin 7-->6  -EKG without ischemic changes  -TTE with no significant abnormalities  -No events noted on telemetry- can d/c if no events after 24h

## 2020-10-19 LAB
ALBUMIN SERPL ELPH-MCNC: 4 G/DL — SIGNIFICANT CHANGE UP (ref 3.3–5)
ALP SERPL-CCNC: 64 U/L — SIGNIFICANT CHANGE UP (ref 40–120)
ALT FLD-CCNC: 58 U/L — HIGH (ref 4–33)
ANION GAP SERPL CALC-SCNC: 12 MMO/L — SIGNIFICANT CHANGE UP (ref 7–14)
AST SERPL-CCNC: 77 U/L — HIGH (ref 4–32)
BASOPHILS # BLD AUTO: 0.04 K/UL — SIGNIFICANT CHANGE UP (ref 0–0.2)
BASOPHILS NFR BLD AUTO: 0.5 % — SIGNIFICANT CHANGE UP (ref 0–2)
BILIRUB SERPL-MCNC: 0.7 MG/DL — SIGNIFICANT CHANGE UP (ref 0.2–1.2)
BUN SERPL-MCNC: 10 MG/DL — SIGNIFICANT CHANGE UP (ref 7–23)
CALCIUM SERPL-MCNC: 9.1 MG/DL — SIGNIFICANT CHANGE UP (ref 8.4–10.5)
CHLORIDE SERPL-SCNC: 104 MMOL/L — SIGNIFICANT CHANGE UP (ref 98–107)
CO2 SERPL-SCNC: 25 MMOL/L — SIGNIFICANT CHANGE UP (ref 22–31)
CREAT SERPL-MCNC: 0.47 MG/DL — LOW (ref 0.5–1.3)
EOSINOPHIL # BLD AUTO: 0.31 K/UL — SIGNIFICANT CHANGE UP (ref 0–0.5)
EOSINOPHIL NFR BLD AUTO: 3.8 % — SIGNIFICANT CHANGE UP (ref 0–6)
GLUCOSE BLDC GLUCOMTR-MCNC: 185 MG/DL — HIGH (ref 70–99)
GLUCOSE BLDC GLUCOMTR-MCNC: 218 MG/DL — HIGH (ref 70–99)
GLUCOSE BLDC GLUCOMTR-MCNC: 368 MG/DL — HIGH (ref 70–99)
GLUCOSE SERPL-MCNC: 158 MG/DL — HIGH (ref 70–99)
HCT VFR BLD CALC: 35.5 % — SIGNIFICANT CHANGE UP (ref 34.5–45)
HGB BLD-MCNC: 11.5 G/DL — SIGNIFICANT CHANGE UP (ref 11.5–15.5)
IMM GRANULOCYTES NFR BLD AUTO: 0.2 % — SIGNIFICANT CHANGE UP (ref 0–1.5)
LACTATE SERPL-SCNC: 1.8 MMOL/L — SIGNIFICANT CHANGE UP (ref 0.5–2)
LYMPHOCYTES # BLD AUTO: 3.18 K/UL — SIGNIFICANT CHANGE UP (ref 1–3.3)
LYMPHOCYTES # BLD AUTO: 39.3 % — SIGNIFICANT CHANGE UP (ref 13–44)
MCHC RBC-ENTMCNC: 24.2 PG — LOW (ref 27–34)
MCHC RBC-ENTMCNC: 32.4 % — SIGNIFICANT CHANGE UP (ref 32–36)
MCV RBC AUTO: 74.7 FL — LOW (ref 80–100)
MONOCYTES # BLD AUTO: 0.53 K/UL — SIGNIFICANT CHANGE UP (ref 0–0.9)
MONOCYTES NFR BLD AUTO: 6.6 % — SIGNIFICANT CHANGE UP (ref 2–14)
NEUTROPHILS # BLD AUTO: 4.01 K/UL — SIGNIFICANT CHANGE UP (ref 1.8–7.4)
NEUTROPHILS NFR BLD AUTO: 49.6 % — SIGNIFICANT CHANGE UP (ref 43–77)
NRBC # FLD: 0 K/UL — SIGNIFICANT CHANGE UP (ref 0–0)
PLATELET # BLD AUTO: 236 K/UL — SIGNIFICANT CHANGE UP (ref 150–400)
PMV BLD: 11.7 FL — SIGNIFICANT CHANGE UP (ref 7–13)
POTASSIUM SERPL-MCNC: 3.8 MMOL/L — SIGNIFICANT CHANGE UP (ref 3.5–5.3)
POTASSIUM SERPL-SCNC: 3.8 MMOL/L — SIGNIFICANT CHANGE UP (ref 3.5–5.3)
PROT SERPL-MCNC: 6.9 G/DL — SIGNIFICANT CHANGE UP (ref 6–8.3)
RBC # BLD: 4.75 M/UL — SIGNIFICANT CHANGE UP (ref 3.8–5.2)
RBC # FLD: 14.7 % — HIGH (ref 10.3–14.5)
SODIUM SERPL-SCNC: 141 MMOL/L — SIGNIFICANT CHANGE UP (ref 135–145)
WBC # BLD: 8.09 K/UL — SIGNIFICANT CHANGE UP (ref 3.8–10.5)
WBC # FLD AUTO: 8.09 K/UL — SIGNIFICANT CHANGE UP (ref 3.8–10.5)

## 2020-10-19 PROCEDURE — 99232 SBSQ HOSP IP/OBS MODERATE 35: CPT

## 2020-10-19 PROCEDURE — 99232 SBSQ HOSP IP/OBS MODERATE 35: CPT | Mod: GC

## 2020-10-19 RX ORDER — SIMETHICONE 80 MG/1
80 TABLET, CHEWABLE ORAL EVERY 6 HOURS
Refills: 0 | Status: DISCONTINUED | OUTPATIENT
Start: 2020-10-19 | End: 2020-10-21

## 2020-10-19 RX ORDER — INSULIN GLARGINE 100 [IU]/ML
24 INJECTION, SOLUTION SUBCUTANEOUS
Refills: 0 | Status: DISCONTINUED | OUTPATIENT
Start: 2020-10-19 | End: 2020-10-21

## 2020-10-19 RX ORDER — INSULIN LISPRO 100/ML
10 VIAL (ML) SUBCUTANEOUS
Refills: 0 | Status: DISCONTINUED | OUTPATIENT
Start: 2020-10-19 | End: 2020-10-21

## 2020-10-19 RX ORDER — POTASSIUM CHLORIDE 20 MEQ
20 PACKET (EA) ORAL ONCE
Refills: 0 | Status: COMPLETED | OUTPATIENT
Start: 2020-10-19 | End: 2020-10-19

## 2020-10-19 RX ADMIN — PANTOPRAZOLE SODIUM 40 MILLIGRAM(S): 20 TABLET, DELAYED RELEASE ORAL at 04:10

## 2020-10-19 RX ADMIN — ENOXAPARIN SODIUM 40 MILLIGRAM(S): 100 INJECTION SUBCUTANEOUS at 12:20

## 2020-10-19 RX ADMIN — Medication 2: at 17:42

## 2020-10-19 RX ADMIN — Medication 9 UNIT(S): at 12:19

## 2020-10-19 RX ADMIN — PREGABALIN 1000 MICROGRAM(S): 225 CAPSULE ORAL at 12:20

## 2020-10-19 RX ADMIN — Medication 10 UNIT(S): at 17:46

## 2020-10-19 RX ADMIN — Medication 81 MILLIGRAM(S): at 12:20

## 2020-10-19 RX ADMIN — SIMETHICONE 80 MILLIGRAM(S): 80 TABLET, CHEWABLE ORAL at 21:15

## 2020-10-19 RX ADMIN — Medication 2: at 08:49

## 2020-10-19 RX ADMIN — Medication 50 MILLIGRAM(S): at 04:10

## 2020-10-19 RX ADMIN — Medication 4: at 12:19

## 2020-10-19 RX ADMIN — AMLODIPINE BESYLATE 10 MILLIGRAM(S): 2.5 TABLET ORAL at 04:10

## 2020-10-19 RX ADMIN — Medication 50 MILLIGRAM(S): at 21:16

## 2020-10-19 RX ADMIN — Medication 9 UNIT(S): at 08:50

## 2020-10-19 RX ADMIN — Medication 20 MILLIEQUIVALENT(S): at 12:20

## 2020-10-19 RX ADMIN — SIMVASTATIN 40 MILLIGRAM(S): 20 TABLET, FILM COATED ORAL at 21:15

## 2020-10-19 RX ADMIN — INSULIN GLARGINE 24 UNIT(S): 100 INJECTION, SOLUTION SUBCUTANEOUS at 17:46

## 2020-10-19 RX ADMIN — Medication 6: at 21:16

## 2020-10-19 NOTE — PROGRESS NOTE ADULT - ASSESSMENT
69 y/o Divehi speaking female, with a PmHx of HTN, HLD, DM, presented to the [facility] ED c/o chest pain, abd pain and sob. Admitted to telemetry for r/o acs and for a rectal wall thickening r/o proctitis and an adenomyomatosis or fundal gallbladder wall calcification r/o porcelain gallbladder. She was also found to have an elevated Lipase and Lactate levels.

## 2020-10-19 NOTE — PROGRESS NOTE ADULT - PROBLEM SELECTOR PLAN 2
CT abd/pelvis with findings of adenomyomatosis or fundal gallbladder wall calcification  GI following  US abd with normal appearance of GB  AST/ALT elevated, likely 2/2 diffuse hepatic steatosis seen on US abd  Rectal wall thickening, inpatient flex-sig vs outpatient colonoscopy

## 2020-10-19 NOTE — PROGRESS NOTE ADULT - PROBLEM SELECTOR PLAN 3
A1c 9.3%  Endocrine following  Increase Lantus to 24u qHS, Humalog 10 units qac  Continue with FS qac and qHS  Moderate dose ISS  Holding home oral diabetic medications   Likely will need insulin upon discharge, Endo following

## 2020-10-19 NOTE — PROGRESS NOTE ADULT - PROBLEM SELECTOR PLAN 1
Resolved.   -Troponin 7-->6  -EKG without ischemic changes  -TTE with no significant abnormalities  -No events noted on telemetry

## 2020-10-19 NOTE — PROGRESS NOTE ADULT - SUBJECTIVE AND OBJECTIVE BOX
Chief Complaint:  Patient is a 70y old  Female who presents with a chief complaint of Chest Pain (18 Oct 2020 14:40)      Interval Events:     No new complaints this morning. Abdominal pain is improved. No blood/rectal pain with stools this admission.    Allergies:  No Known Allergies      Hospital Medications:  acetaminophen   Tablet .. 650 milliGRAM(s) Oral every 6 hours PRN  amLODIPine   Tablet 10 milliGRAM(s) Oral daily  aspirin enteric coated 81 milliGRAM(s) Oral daily  cyanocobalamin 1000 MICROGram(s) Oral daily  dextrose 40% Gel 15 Gram(s) Oral once PRN  dextrose 5%. 1000 milliLiter(s) IV Continuous <Continuous>  dextrose 50% Injectable 12.5 Gram(s) IV Push once  dextrose 50% Injectable 25 Gram(s) IV Push once  dextrose 50% Injectable 25 Gram(s) IV Push once  enoxaparin Injectable 40 milliGRAM(s) SubCutaneous daily  glucagon  Injectable 1 milliGRAM(s) IntraMuscular once PRN  insulin glargine Injectable (LANTUS) 20 Unit(s) SubCutaneous <User Schedule>  insulin lispro (HumaLOG) corrective regimen sliding scale   SubCutaneous three times a day before meals  insulin lispro (HumaLOG) corrective regimen sliding scale   SubCutaneous at bedtime  insulin lispro Injectable (HumaLOG) 9 Unit(s) SubCutaneous three times a day before meals  metoprolol tartrate 50 milliGRAM(s) Oral three times a day  pantoprazole    Tablet 40 milliGRAM(s) Oral before breakfast  simvastatin 40 milliGRAM(s) Oral at bedtime      PMHX/PSHX:  HLD (hyperlipidemia)    DM (diabetes mellitus)    HTN (hypertension)    No significant past surgical history        Family history:  Family history of hypertension    No pertinent family history in first degree relatives        ROS:     General:  No weight loss, fevers, chills, night sweats, fatigue   Eyes:  No vision changes  ENT:  No sore throat, pain, runny nose  CV:  No chest pain, palpitations, dizziness   Resp:  No SOB, cough, wheezing  GI:  See HPI  :  No burning with urination, hematuria  Muscle:  No pain, weakness  Neuro:  No weakness/tingling, memory problems  Psych:  No fatigue, insomnia, mood problems, depression  Heme:  No easy bruisability  Skin:  No rash, edema      PHYSICAL EXAM:     GENERAL: Elderly, well developed, no acute distress   HEENT:  Conjunctivae clear, sclera anicteric  CHEST:  No increased effort w/ respirations  HEART:  Regular rhythm & rate  ABDOMEN:  Soft, obese, non-distended +LLQ tenderness   EXTREMITIES:  Warm, well perfused, no edema  SKIN:  No rash/erythema/ecchymoses/jaundice   NEURO:  Alert, orientedx3    Vital Signs:  Vital Signs Last 24 Hrs  T(C): 36.3 (19 Oct 2020 04:08), Max: 36.4 (18 Oct 2020 12:47)  T(F): 97.3 (19 Oct 2020 04:08), Max: 97.6 (18 Oct 2020 12:47)  HR: 67 (19 Oct 2020 04:08) (67 - 69)  BP: 123/66 (19 Oct 2020 04:08) (112/68 - 130/67)  BP(mean): --  RR: 17 (19 Oct 2020 04:08) (17 - 18)  SpO2: 96% (19 Oct 2020 04:08) (96% - 100%)  Daily     Daily     LABS:                        11.5   8.09  )-----------( 236      ( 19 Oct 2020 05:42 )             35.5     10-19    141  |  104  |  10  ----------------------------<  158<H>  3.8   |  25  |  0.47<L>    Ca    9.1      19 Oct 2020 05:42    TPro  6.9  /  Alb  4.0  /  TBili  0.7  /  DBili  x   /  AST  77<H>  /  ALT  58<H>  /  AlkPhos  64  10-19    LIVER FUNCTIONS - ( 19 Oct 2020 05:42 )  Alb: 4.0 g/dL / Pro: 6.9 g/dL / ALK PHOS: 64 u/L / ALT: 58 u/L / AST: 77 u/L / GGT: x               Imaging:      EXAM:  US ABDOMEN LIMITED        PROCEDURE DATE:  Oct 18 2020         INTERPRETATION:  CLINICAL INFORMATION: Abdominal pain. Evaluate for porcelain gallbladder.    COMPARISON: CT scan dated 10/16/2020 of the abdomen and pelvis    TECHNIQUE: Sonography of the right upper quadrant.    FINDINGS:    Liver: Diffuse fatty infiltration.  Bile ducts: Normal caliber. Common bile duct measures 3 mm.  Gallbladder: Within normal limits.  Pancreas: Visualized portions are within normal limits.  Right kidney: 8.6 cm. No hydronephrosis.  Ascites: None.  IVC: Visualized portions are within normal limits.    IMPRESSION:    Diffuse fatty infiltration of the liver.    Normal sonographic appearance of the gallbladder.

## 2020-10-19 NOTE — PHARMACOTHERAPY INTERVENTION NOTE - COMMENTS
(#[id number]) originally used but got disconnected and pt preferred her son be present. Called son and met him at bedside around 2pm (he translated for pt). Counseled on A1c (and goal A1c), basal insulin pen w/demonstration - pt's son did not perform return demo as he had to get to work in [place]. He said he will give insulin to pt as pt would prefer someone else inject. Counseled son & pt on where to inject insulin (abdomen, outer thighs) and sharps disposal. Counseled on stopping glipizide (due to increased hypoglycemia with insulin) and Januvia due to increased lipase. Will follow-up again. Pt's son would like endocrine follow-up (endocrine emailed [facility] already).

## 2020-10-19 NOTE — CHART NOTE - NSCHARTNOTEFT_GEN_A_CORE
Patient states she would like the flex sig for evaluation however would like team to discuss with her son. Discussed with son Luis Felipe regarding flex sig tomorrow for rectal thickening. He is in agreement. Will proceed with flex sig tomorrow.       - NPO after midnight  - please order 2 tap water enemas to be given prior to procedure      Becca Gupta PGY-4  Gastroenterology Fellow  Pager #[phone] or [phone]  Please page on-call Fellow on weekends/after 5 pm on weekdays at [facility]  Please call answering service for on-call GI fellow ([phone]) after 5pm and before 8am, and on weekends.

## 2020-10-19 NOTE — CHART NOTE - NSCHARTNOTEFT_GEN_A_CORE
Glucose pattern reviewed   Trending above inpatient target of 100-180 mg / dL  Will increase Lantus to 24 units   Will increase pre-meal Humalog to 10 units   C/W Humalog moderate correctional scales before meals and bedtime   Transitions of care pharmacist meeting with patient/son today for DM education   Endocrine clinic to help facilitate scheduling follow up appt (emailed clinic today)  Endocrine following      CAPILLARY BLOOD GLUCOSE  POCT Blood Glucose.: 218 mg/dL (19 Oct 2020 12:08)  POCT Blood Glucose.: 181 mg/dL (19 Oct 2020 08:21)  POCT Blood Glucose.: 203 mg/dL (19 Oct 2020 00:30)  POCT Blood Glucose.: 260 mg/dL (18 Oct 2020 17:15)    10-19    141  |  104  |  10  ----------------------------<  158<H>  3.8   |  25  |  0.47<L>    Ca    9.1      19 Oct 2020 05:42    TPro  6.9  /  Alb  4.0  /  TBili  0.7  /  DBili  x   /  AST  77<H>  /  ALT  58<H>  /  AlkPhos  64  10-19      MEDICATIONS  (STANDING):  amLODIPine   Tablet 10 milliGRAM(s) Oral daily  aspirin enteric coated 81 milliGRAM(s) Oral daily  cyanocobalamin 1000 MICROGram(s) Oral daily  dextrose 5%. 1000 milliLiter(s) (50 mL/Hr) IV Continuous <Continuous>  dextrose 50% Injectable 12.5 Gram(s) IV Push once  dextrose 50% Injectable 25 Gram(s) IV Push once  dextrose 50% Injectable 25 Gram(s) IV Push once  enoxaparin Injectable 40 milliGRAM(s) SubCutaneous daily  insulin glargine Injectable (LANTUS) 20 Unit(s) SubCutaneous <User Schedule>  insulin lispro (HumaLOG) corrective regimen sliding scale   SubCutaneous three times a day before meals  insulin lispro (HumaLOG) corrective regimen sliding scale   SubCutaneous at bedtime  insulin lispro Injectable (HumaLOG) 9 Unit(s) SubCutaneous three times a day before meals  metoprolol tartrate 50 milliGRAM(s) Oral three times a day  pantoprazole    Tablet 40 milliGRAM(s) Oral before breakfast  simvastatin 40 milliGRAM(s) Oral at bedtime

## 2020-10-19 NOTE — PROGRESS NOTE ADULT - ASSESSMENT
70 year old Frisian-speaking female with DM2, HTN and HLD presenting with chest pain and abdominal pain.    #Abnormal CT with rectal wall thickening: May represent colitis (infectious, inflammatory), neoplasm, or underdistention of colon.  #Gallbladder adenomyomatosis vs. fundal wall calcification  #Acute on chronic abdominal pain: Differential includes uncontrolled hyperglycemia vs. gastritis vs. functional abdominal pain. NM gastric-emptying study and CT in 05/2019 unremarkable. EGD in 05/2018 with esophagitis and gastritis.  #Type 2 diabetes mellitus with hyperglycemia on admission     Recommendations:  - can offer inpatient flexible sigmoidoscopy for evaluation of rectal wall thickening if patient/son amenable; otherwise, reasonable to purse colonoscopy as outpatient  - will need tap water enemas x2 on day prior to procedure  - improved glycemic control   - daily oral PPI    Josy Ramos PGY-5  Gastroenterology Fellow  Page #[phone]   Page #[phone] 5pm-7am on weekdays, and on weekends

## 2020-10-19 NOTE — PROGRESS NOTE ADULT - SUBJECTIVE AND OBJECTIVE BOX
PROGRESS NOTE:     Patient is a 70y old  Female who presents with a chief complaint of Chest Pain (19 Oct 2020 09:06)      SUBJECTIVE / OVERNIGHT EVENTS: No new complaints.     ADDITIONAL REVIEW OF SYSTEMS:    MEDICATIONS  (STANDING):  amLODIPine   Tablet 10 milliGRAM(s) Oral daily  aspirin enteric coated 81 milliGRAM(s) Oral daily  cyanocobalamin 1000 MICROGram(s) Oral daily  dextrose 5%. 1000 milliLiter(s) (50 mL/Hr) IV Continuous <Continuous>  dextrose 50% Injectable 12.5 Gram(s) IV Push once  dextrose 50% Injectable 25 Gram(s) IV Push once  dextrose 50% Injectable 25 Gram(s) IV Push once  enoxaparin Injectable 40 milliGRAM(s) SubCutaneous daily  insulin glargine Injectable (LANTUS) 24 Unit(s) SubCutaneous <User Schedule>  insulin lispro (HumaLOG) corrective regimen sliding scale   SubCutaneous three times a day before meals  insulin lispro (HumaLOG) corrective regimen sliding scale   SubCutaneous at bedtime  insulin lispro Injectable (HumaLOG) 10 Unit(s) SubCutaneous three times a day before meals  metoprolol tartrate 50 milliGRAM(s) Oral three times a day  pantoprazole    Tablet 40 milliGRAM(s) Oral before breakfast  simvastatin 40 milliGRAM(s) Oral at bedtime    MEDICATIONS  (PRN):  acetaminophen   Tablet .. 650 milliGRAM(s) Oral every 6 hours PRN Temp greater or equal to 38C (100.4F), Mild Pain (1 - 3), Moderate Pain (4 - 6)  dextrose 40% Gel 15 Gram(s) Oral once PRN Blood Glucose LESS THAN 70 milliGRAM(s)/deciliter  glucagon  Injectable 1 milliGRAM(s) IntraMuscular once PRN Glucose LESS THAN 70 milligrams/deciliter      CAPILLARY BLOOD GLUCOSE      POCT Blood Glucose.: 218 mg/dL (19 Oct 2020 12:08)  POCT Blood Glucose.: 181 mg/dL (19 Oct 2020 08:21)  POCT Blood Glucose.: 203 mg/dL (19 Oct 2020 00:30)  POCT Blood Glucose.: 260 mg/dL (18 Oct 2020 17:15)    I&O's Summary    18 Oct 2020 07:01  -  19 Oct 2020 07:00  --------------------------------------------------------  IN: 400 mL / OUT: 0 mL / NET: 400 mL        PHYSICAL EXAM:  Vital Signs Last 24 Hrs  T(C): 36.6 (19 Oct 2020 11:33), Max: 36.6 (19 Oct 2020 11:33)  T(F): 97.8 (19 Oct 2020 11:33), Max: 97.8 (19 Oct 2020 11:33)  HR: 67 (19 Oct 2020 11:33) (67 - 68)  BP: 120/57 (19 Oct 2020 11:33) (120/57 - 132/66)  BP(mean): --  RR: 17 (19 Oct 2020 11:33) (16 - 18)  SpO2: 98% (19 Oct 2020 11:33) (96% - 100%)    CONSTITUTIONAL: NAD, well-developed, well-groomed  EYES: Conjunctiva and sclera clear  RESPIRATORY: Normal respiratory effort; lungs are clear to auscultation bilaterally  CARDIOVASCULAR: Regular rate and rhythm, normal S1 and S2, no murmur/rub/gallop; no LE edema  ABDOMEN: Nontender to palpation, normoactive bowel sounds, no rebound/guarding  MUSCULOSKELETAL: No clubbing or cyanosis of digits  PSYCH: A+O to person, place, and time; affect appropriate  NEUROLOGY: No focal deficits  SKIN: No rashes; no palpable lesions      LABS:                        11.5   8.09  )-----------( 236      ( 19 Oct 2020 05:42 )             35.5     10-19    141  |  104  |  10  ----------------------------<  158<H>  3.8   |  25  |  0.47<L>    Ca    9.1      19 Oct 2020 05:42    TPro  6.9  /  Alb  4.0  /  TBili  0.7  /  DBili  x   /  AST  77<H>  /  ALT  58<H>  /  AlkPhos  64  10-19                RADIOLOGY & ADDITIONAL TESTS:  Results Reviewed:   Imaging Personally Reviewed:  Electrocardiogram Personally Reviewed:    COORDINATION OF CARE:  Care Discussed with Consultants/Other Providers [Y/N]:  Prior or Outpatient Records Reviewed [Y/N]:

## 2020-10-20 ENCOUNTER — RESULT REVIEW (OUTPATIENT)
Age: 70
End: 2020-10-20

## 2020-10-20 LAB
ANION GAP SERPL CALC-SCNC: 12 MMO/L — SIGNIFICANT CHANGE UP (ref 7–14)
BUN SERPL-MCNC: 9 MG/DL — SIGNIFICANT CHANGE UP (ref 7–23)
CALCIUM SERPL-MCNC: 9.3 MG/DL — SIGNIFICANT CHANGE UP (ref 8.4–10.5)
CHLORIDE SERPL-SCNC: 105 MMOL/L — SIGNIFICANT CHANGE UP (ref 98–107)
CO2 SERPL-SCNC: 26 MMOL/L — SIGNIFICANT CHANGE UP (ref 22–31)
CREAT SERPL-MCNC: 0.47 MG/DL — LOW (ref 0.5–1.3)
GLUCOSE BLDC GLUCOMTR-MCNC: 114 MG/DL — HIGH (ref 70–99)
GLUCOSE BLDC GLUCOMTR-MCNC: 131 MG/DL — HIGH (ref 70–99)
GLUCOSE BLDC GLUCOMTR-MCNC: 138 MG/DL — HIGH (ref 70–99)
GLUCOSE BLDC GLUCOMTR-MCNC: 217 MG/DL — HIGH (ref 70–99)
GLUCOSE BLDC GLUCOMTR-MCNC: 445 MG/DL — HIGH (ref 70–99)
GLUCOSE BLDC GLUCOMTR-MCNC: 96 MG/DL — SIGNIFICANT CHANGE UP (ref 70–99)
GLUCOSE SERPL-MCNC: 125 MG/DL — HIGH (ref 70–99)
HCT VFR BLD CALC: 36.7 % — SIGNIFICANT CHANGE UP (ref 34.5–45)
HGB BLD-MCNC: 11.6 G/DL — SIGNIFICANT CHANGE UP (ref 11.5–15.5)
MAGNESIUM SERPL-MCNC: 1.8 MG/DL — SIGNIFICANT CHANGE UP (ref 1.6–2.6)
MCHC RBC-ENTMCNC: 23.6 PG — LOW (ref 27–34)
MCHC RBC-ENTMCNC: 31.6 % — LOW (ref 32–36)
MCV RBC AUTO: 74.7 FL — LOW (ref 80–100)
NRBC # FLD: 0 K/UL — SIGNIFICANT CHANGE UP (ref 0–0)
PHOSPHATE SERPL-MCNC: 4 MG/DL — SIGNIFICANT CHANGE UP (ref 2.5–4.5)
PLATELET # BLD AUTO: 238 K/UL — SIGNIFICANT CHANGE UP (ref 150–400)
PMV BLD: 11.4 FL — SIGNIFICANT CHANGE UP (ref 7–13)
POTASSIUM SERPL-MCNC: 4.1 MMOL/L — SIGNIFICANT CHANGE UP (ref 3.5–5.3)
POTASSIUM SERPL-SCNC: 4.1 MMOL/L — SIGNIFICANT CHANGE UP (ref 3.5–5.3)
RBC # BLD: 4.91 M/UL — SIGNIFICANT CHANGE UP (ref 3.8–5.2)
RBC # FLD: 14.9 % — HIGH (ref 10.3–14.5)
SODIUM SERPL-SCNC: 143 MMOL/L — SIGNIFICANT CHANGE UP (ref 135–145)
WBC # BLD: 7.13 K/UL — SIGNIFICANT CHANGE UP (ref 3.8–10.5)
WBC # FLD AUTO: 7.13 K/UL — SIGNIFICANT CHANGE UP (ref 3.8–10.5)

## 2020-10-20 PROCEDURE — 88305 TISSUE EXAM BY PATHOLOGIST: CPT | Mod: 26

## 2020-10-20 PROCEDURE — 45331 SIGMOIDOSCOPY AND BIOPSY: CPT | Mod: GC

## 2020-10-20 PROCEDURE — 99232 SBSQ HOSP IP/OBS MODERATE 35: CPT

## 2020-10-20 RX ORDER — ATORVASTATIN CALCIUM 80 MG/1
20 TABLET, FILM COATED ORAL AT BEDTIME
Refills: 0 | Status: DISCONTINUED | OUTPATIENT
Start: 2020-10-20 | End: 2020-10-21

## 2020-10-20 RX ADMIN — INSULIN GLARGINE 24 UNIT(S): 100 INJECTION, SOLUTION SUBCUTANEOUS at 18:25

## 2020-10-20 RX ADMIN — ENOXAPARIN SODIUM 40 MILLIGRAM(S): 100 INJECTION SUBCUTANEOUS at 12:34

## 2020-10-20 RX ADMIN — ATORVASTATIN CALCIUM 20 MILLIGRAM(S): 80 TABLET, FILM COATED ORAL at 21:38

## 2020-10-20 RX ADMIN — Medication 8: at 22:41

## 2020-10-20 RX ADMIN — Medication 50 MILLIGRAM(S): at 21:38

## 2020-10-20 NOTE — PROGRESS NOTE ADULT - ASSESSMENT
70 y.o. female with h/o type 2 DM uncontrolled, HTN , hyperlipidemia presents with SOB, CP and abdominal pain. Endocrine consulted for uncontrolled DM 2 (HbA1c 9.3%)    1. Uncontrolled type 2 diabetes mellitus with hyperglycemia  -HbA1c uncontrolled at 9.3%, patient is on oral agents only at home (Metformin, Glipizide, Januvia), also endorsing high carb diet. Goal A1c approximately 7%   -Inpatient BG target 100-180 mg/dl  -Continue Lantus 24 units SQ daily at 6 PM   -Once diet is resumed, continue Humalog 10 units SQ TID before meals (Hold if not eating)   -Continue Humalog moderate dose correctional scale SQ TID before meals and Humalog moderate dose bedtime correctional scale SQ qHS   -Consistent carbohydrate diet, recommend RD consultation  -Check BG before meals and bedtime  -DM education: Starting insulin was discussed with patient. Patient will likely need at least basal insulin at discharge. Insulin pen and glucometer education was started with patient/son on 10/19/20. Patient or caregiver will need to perform successful return demonstration prior to discharge. Please continue to educate while admitted and document in DM CPG.     Discharge Plan:  -Basal/oral vs basal/bolus depending on inpatient requirements   -Given elevated lipase and GI symptoms, we may need to avoid GLP-1 analogs and DDP-IV inhibitors.   -Ensure patient has supplies including glucometer, glucose test strips, lancets, alcohol swabs and insulin PEN needles  -Patient with Medicaid insurance - recommend followup with [facility] [place], Endocrine Clinic: [id number] [facility] [address], [phone]    2. Essential hypertension  -BP goal is <130/80  -Management as per primary team.     3. Other hyperlipidemia  -LDL target less than 70  -Currently on Simvastatin 40 mg daily. Recommend switching to Atorvastatin as patient is concurrently on Amlodipine for BP    Sharon Cole  Nurse Practitioner  Division of Endocrinology & Diabetes  In house pager #[phone]/long range pager #[phone]    If before 9AM or after 6PM, or on weekends/holidays, please call endocrine answering service for assistance ([phone]).  For nonurgent matters email [email or web address] for assistance.

## 2020-10-20 NOTE — DIETITIAN INITIAL EVALUATION ADULT. - PERTINENT MEDS FT
MEDICATIONS  (STANDING):  amLODIPine   Tablet 10 milliGRAM(s) Oral daily  aspirin enteric coated 81 milliGRAM(s) Oral daily  cyanocobalamin 1000 MICROGram(s) Oral daily  dextrose 5%. 1000 milliLiter(s) (50 mL/Hr) IV Continuous <Continuous>  dextrose 50% Injectable 12.5 Gram(s) IV Push once  dextrose 50% Injectable 25 Gram(s) IV Push once  dextrose 50% Injectable 25 Gram(s) IV Push once  enoxaparin Injectable 40 milliGRAM(s) SubCutaneous daily  insulin glargine Injectable (LANTUS) 24 Unit(s) SubCutaneous <User Schedule>  insulin lispro (HumaLOG) corrective regimen sliding scale   SubCutaneous three times a day before meals  insulin lispro (HumaLOG) corrective regimen sliding scale   SubCutaneous at bedtime  insulin lispro Injectable (HumaLOG) 10 Unit(s) SubCutaneous three times a day before meals  metoprolol tartrate 50 milliGRAM(s) Oral three times a day  pantoprazole    Tablet 40 milliGRAM(s) Oral before breakfast  simvastatin 40 milliGRAM(s) Oral at bedtime

## 2020-10-20 NOTE — PROGRESS NOTE ADULT - SUBJECTIVE AND OBJECTIVE BOX
Chief Complaint: DM 2    History: Patient seen at bedside, communicated via [employer] ID # [id number] to Quintin  Patient is NPO today. Reports she is feeling ok, denies n/v, denies s/s of hypoglycemia  Most recent BG stable at 131 mg/dl   Yesterday, education provided by pharmacy liaison to patient & son regarding insulin PEN use     MEDICATIONS  (STANDING):  amLODIPine   Tablet 10 milliGRAM(s) Oral daily  aspirin enteric coated 81 milliGRAM(s) Oral daily  cyanocobalamin 1000 MICROGram(s) Oral daily  dextrose 5%. 1000 milliLiter(s) (50 mL/Hr) IV Continuous <Continuous>  dextrose 50% Injectable 12.5 Gram(s) IV Push once  dextrose 50% Injectable 25 Gram(s) IV Push once  dextrose 50% Injectable 25 Gram(s) IV Push once  enoxaparin Injectable 40 milliGRAM(s) SubCutaneous daily  insulin glargine Injectable (LANTUS) 24 Unit(s) SubCutaneous <User Schedule>  insulin lispro (HumaLOG) corrective regimen sliding scale   SubCutaneous three times a day before meals  insulin lispro (HumaLOG) corrective regimen sliding scale   SubCutaneous at bedtime  insulin lispro Injectable (HumaLOG) 10 Unit(s) SubCutaneous three times a day before meals  metoprolol tartrate 50 milliGRAM(s) Oral three times a day  pantoprazole    Tablet 40 milliGRAM(s) Oral before breakfast  simvastatin 40 milliGRAM(s) Oral at bedtime    MEDICATIONS  (PRN):  acetaminophen   Tablet .. 650 milliGRAM(s) Oral every 6 hours PRN Temp greater or equal to 38C (100.4F), Mild Pain (1 - 3), Moderate Pain (4 - 6)  dextrose 40% Gel 15 Gram(s) Oral once PRN Blood Glucose LESS THAN 70 milliGRAM(s)/deciliter  glucagon  Injectable 1 milliGRAM(s) IntraMuscular once PRN Glucose LESS THAN 70 milligrams/deciliter  simethicone 80 milliGRAM(s) Chew every 6 hours PRN Indigestion    No Known Allergies    Review of Systems:  HEENT: No pain  Cardiovascular: No chest pain  Respiratory: No SOB  GI: No nausea, vomiting    PHYSICAL EXAM:  VITALS: T(C): 36.7 (10-20-20 @ 11:06)  T(F): 98 (10-20-20 @ 11:06), Max: 98 (10-20-20 @ 11:06)  HR: 76 (10-20-20 @ 11:06) (65 - 76)  BP: 148/72 (10-20-20 @ 11:06) (111/55 - 148/72)  RR:  (16 - 18)  SpO2:  (98% - 100%)  Wt(kg): --  GENERAL: NAD  EYES: No proptosis, no lid lag, anicteric  HEENT:  Atraumatic, Normocephalic, moist mucous membranes  RESPIRATORY: unlabored respirations   PSYCH: Alert and oriented x 3    CAPILLARY BLOOD GLUCOSE    POCT Blood Glucose.: 131 mg/dL (20 Oct 2020 09:02)  POCT Blood Glucose.: 138 mg/dL (20 Oct 2020 03:06)  POCT Blood Glucose.: 368 mg/dL (19 Oct 2020 21:14)  POCT Blood Glucose.: 185 mg/dL (19 Oct 2020 17:36)      10-20    143  |  105  |  9   ----------------------------<  125<H>  4.1   |  26  |  0.47<L>    EGFR if : 115  EGFR if non : 99    Ca    9.3      10-20  Mg     1.8     10-20  Phos  4.0     10-20    TPro  6.9  /  Alb  4.0  /  TBili  0.7  /  DBili  x   /  AST  77<H>  /  ALT  58<H>  /  AlkPhos  64  10-19      Thyroid Function Tests:  10-17 @ 08:27 TSH 3.86 FreeT4 -- T3 -- Anti TPO -- Anti Thyroglobulin Ab -- TSI --      A1C with Estimated Average Glucose: 9.3 % (10-17-20 @ 08:27)

## 2020-10-20 NOTE — PROGRESS NOTE ADULT - PROBLEM SELECTOR PLAN 2
CT abd/pelvis with findings of adenomyomatosis or fundal gallbladder wall calcification  US abd with normal appearance of GB  AST/ALT elevated, likely 2/2 diffuse hepatic steatosis seen on US abd  Rectal wall thickening, plan for inpatient flex-sig today

## 2020-10-20 NOTE — PROGRESS NOTE ADULT - PROBLEM SELECTOR PLAN 3
A1c 9.3%  Endocrine following  Continue Lantus 24u qHS, restart Humalog 10 units qac post EGD   Continue with FS qac and qHS  Moderate dose ISS  Holding home oral diabetic medications   Likely d/c on basal insulin and PO meds as per Endo  Diabetic teaching

## 2020-10-20 NOTE — PROGRESS NOTE ADULT - SUBJECTIVE AND OBJECTIVE BOX
PROGRESS NOTE:     Patient is a 70y old  Female who presents with a chief complaint of Chest Pain (20 Oct 2020 13:10)      SUBJECTIVE / OVERNIGHT EVENTS: No acute events.     ADDITIONAL REVIEW OF SYSTEMS:    MEDICATIONS  (STANDING):  amLODIPine   Tablet 10 milliGRAM(s) Oral daily  aspirin enteric coated 81 milliGRAM(s) Oral daily  atorvastatin 20 milliGRAM(s) Oral at bedtime  cyanocobalamin 1000 MICROGram(s) Oral daily  dextrose 5%. 1000 milliLiter(s) (50 mL/Hr) IV Continuous <Continuous>  dextrose 50% Injectable 12.5 Gram(s) IV Push once  dextrose 50% Injectable 25 Gram(s) IV Push once  dextrose 50% Injectable 25 Gram(s) IV Push once  enoxaparin Injectable 40 milliGRAM(s) SubCutaneous daily  insulin glargine Injectable (LANTUS) 24 Unit(s) SubCutaneous <User Schedule>  insulin lispro (HumaLOG) corrective regimen sliding scale   SubCutaneous three times a day before meals  insulin lispro (HumaLOG) corrective regimen sliding scale   SubCutaneous at bedtime  insulin lispro Injectable (HumaLOG) 10 Unit(s) SubCutaneous three times a day before meals  metoprolol tartrate 50 milliGRAM(s) Oral three times a day  pantoprazole    Tablet 40 milliGRAM(s) Oral before breakfast    MEDICATIONS  (PRN):  acetaminophen   Tablet .. 650 milliGRAM(s) Oral every 6 hours PRN Temp greater or equal to 38C (100.4F), Mild Pain (1 - 3), Moderate Pain (4 - 6)  dextrose 40% Gel 15 Gram(s) Oral once PRN Blood Glucose LESS THAN 70 milliGRAM(s)/deciliter  glucagon  Injectable 1 milliGRAM(s) IntraMuscular once PRN Glucose LESS THAN 70 milligrams/deciliter  simethicone 80 milliGRAM(s) Chew every 6 hours PRN Indigestion      CAPILLARY BLOOD GLUCOSE      POCT Blood Glucose.: 114 mg/dL (20 Oct 2020 13:45)  POCT Blood Glucose.: 131 mg/dL (20 Oct 2020 09:02)  POCT Blood Glucose.: 138 mg/dL (20 Oct 2020 03:06)  POCT Blood Glucose.: 368 mg/dL (19 Oct 2020 21:14)  POCT Blood Glucose.: 185 mg/dL (19 Oct 2020 17:36)    I&O's Summary    19 Oct 2020 07:01  -  20 Oct 2020 07:00  --------------------------------------------------------  IN: 200 mL / OUT: 0 mL / NET: 200 mL        PHYSICAL EXAM:  Vital Signs Last 24 Hrs  T(C): 37.1 (20 Oct 2020 14:57), Max: 37.1 (20 Oct 2020 14:57)  T(F): 98.7 (20 Oct 2020 14:57), Max: 98.7 (20 Oct 2020 14:57)  HR: 80 (20 Oct 2020 14:57) (65 - 80)  BP: 149/74 (20 Oct 2020 14:57) (111/55 - 149/74)  BP(mean): --  RR: 21 (20 Oct 2020 14:57) (16 - 21)  SpO2: 99% (20 Oct 2020 14:57) (98% - 100%)    CONSTITUTIONAL: NAD, well-developed, well-groomed  EYES: Conjunctiva and sclera clear  RESPIRATORY: Normal respiratory effort; lungs are clear to auscultation bilaterally  CARDIOVASCULAR: Regular rate and rhythm, normal S1 and S2, no murmur/rub/gallop; no LE edema  ABDOMEN: Nontender to palpation, normoactive bowel sounds, no rebound/guarding  MUSCULOSKELETAL: No clubbing or cyanosis of digits  PSYCH: A+O to person, place, and time; affect appropriate  NEUROLOGY: No focal deficits  SKIN: No rashes; no palpable lesions    LABS:                        11.6   7.13  )-----------( 238      ( 20 Oct 2020 03:10 )             36.7     10-20    143  |  105  |  9   ----------------------------<  125<H>  4.1   |  26  |  0.47<L>    Ca    9.3      20 Oct 2020 03:10  Phos  4.0     10-20  Mg     1.8     10-20    TPro  6.9  /  Alb  4.0  /  TBili  0.7  /  DBili  x   /  AST  77<H>  /  ALT  58<H>  /  AlkPhos  64  10-19                RADIOLOGY & ADDITIONAL TESTS:  Results Reviewed:   Imaging Personally Reviewed:  Electrocardiogram Personally Reviewed:    COORDINATION OF CARE:  Care Discussed with Consultants/Other Providers [Y/N]:  Prior or Outpatient Records Reviewed [Y/N]:

## 2020-10-20 NOTE — DIETITIAN INITIAL EVALUATION ADULT. - OTHER INFO
used ID [id number]. Pt has been on clears for past 3 days. States her pain is improving- going for endoscopy/colonoscopy today. Denies any GI issues (nausea/vomiting/diarrhea/constipation.) Denies any chewing or swallowing difficulties at this time.  pounds.     A1c is 9.3%. Provided pt with handout Type 2 Diabetes Nutrition Therapy. Discussed carbohydrate sources, carbohydrate portions, protein sources, mixed meals, and nutrition label reading.

## 2020-10-20 NOTE — PROGRESS NOTE ADULT - ASSESSMENT
71 y/o [place] speaking female, with a PmHx of HTN, HLD, DM, presented to the [facility] ED c/o chest pain, abd pain and sob. Admitted to telemetry for r/o acs and for a rectal wall thickening r/o proctitis and an adenomyomatosis or fundal gallbladder wall calcification r/o porcelain gallbladder. She was also found to have an elevated Lipase and Lactate levels.

## 2020-10-20 NOTE — DIETITIAN INITIAL EVALUATION ADULT. - PERTINENT LABORATORY DATA
10-20 Na 143 mmol/L Glu 125 mg/dL<H> K+ 4.1 mmol/L Cr 0.47 mg/dL<L> BUN 9 mg/dL Phos 4.0 mg/dL  10-20 @ 09:02 POCT 131 mg/dL  10-20 @ 03:06 POCT 138 mg/dL  10-19 @ 21:14 POCT 368 mg/dL  10-19 @ 17:36 POCT 185 mg/dL

## 2020-10-21 ENCOUNTER — TRANSCRIPTION ENCOUNTER (OUTPATIENT)
Age: 70
End: 2020-10-21

## 2020-10-21 VITALS
SYSTOLIC BLOOD PRESSURE: 129 MMHG | TEMPERATURE: 98 F | DIASTOLIC BLOOD PRESSURE: 61 MMHG | RESPIRATION RATE: 19 BRPM | HEART RATE: 73 BPM | OXYGEN SATURATION: 99 %

## 2020-10-21 PROBLEM — Z00.00 ENCOUNTER FOR PREVENTIVE HEALTH EXAMINATION: Status: ACTIVE | Noted: 2020-10-21

## 2020-10-21 PROBLEM — E78.5 HYPERLIPIDEMIA, UNSPECIFIED: Chronic | Status: ACTIVE | Noted: 2020-10-16

## 2020-10-21 LAB
ANION GAP SERPL CALC-SCNC: 10 MMO/L — SIGNIFICANT CHANGE UP (ref 7–14)
BUN SERPL-MCNC: 8 MG/DL — SIGNIFICANT CHANGE UP (ref 7–23)
CALCIUM SERPL-MCNC: 9.8 MG/DL — SIGNIFICANT CHANGE UP (ref 8.4–10.5)
CHLORIDE SERPL-SCNC: 104 MMOL/L — SIGNIFICANT CHANGE UP (ref 98–107)
CO2 SERPL-SCNC: 28 MMOL/L — SIGNIFICANT CHANGE UP (ref 22–31)
CREAT SERPL-MCNC: 0.58 MG/DL — SIGNIFICANT CHANGE UP (ref 0.5–1.3)
GLUCOSE BLDC GLUCOMTR-MCNC: 129 MG/DL — HIGH (ref 70–99)
GLUCOSE BLDC GLUCOMTR-MCNC: 200 MG/DL — HIGH (ref 70–99)
GLUCOSE BLDC GLUCOMTR-MCNC: 96 MG/DL — SIGNIFICANT CHANGE UP (ref 70–99)
GLUCOSE SERPL-MCNC: 92 MG/DL — SIGNIFICANT CHANGE UP (ref 70–99)
HCT VFR BLD CALC: 37.9 % — SIGNIFICANT CHANGE UP (ref 34.5–45)
HGB BLD-MCNC: 11.9 G/DL — SIGNIFICANT CHANGE UP (ref 11.5–15.5)
MAGNESIUM SERPL-MCNC: 1.9 MG/DL — SIGNIFICANT CHANGE UP (ref 1.6–2.6)
MCHC RBC-ENTMCNC: 23.8 PG — LOW (ref 27–34)
MCHC RBC-ENTMCNC: 31.4 % — LOW (ref 32–36)
MCV RBC AUTO: 75.6 FL — LOW (ref 80–100)
NRBC # FLD: 0 K/UL — SIGNIFICANT CHANGE UP (ref 0–0)
PHOSPHATE SERPL-MCNC: 4.6 MG/DL — HIGH (ref 2.5–4.5)
PLATELET # BLD AUTO: 252 K/UL — SIGNIFICANT CHANGE UP (ref 150–400)
PMV BLD: 11.3 FL — SIGNIFICANT CHANGE UP (ref 7–13)
POTASSIUM SERPL-MCNC: 4 MMOL/L — SIGNIFICANT CHANGE UP (ref 3.5–5.3)
POTASSIUM SERPL-SCNC: 4 MMOL/L — SIGNIFICANT CHANGE UP (ref 3.5–5.3)
RBC # BLD: 5.01 M/UL — SIGNIFICANT CHANGE UP (ref 3.8–5.2)
RBC # FLD: 15 % — HIGH (ref 10.3–14.5)
SODIUM SERPL-SCNC: 142 MMOL/L — SIGNIFICANT CHANGE UP (ref 135–145)
WBC # BLD: 6.54 K/UL — SIGNIFICANT CHANGE UP (ref 3.8–10.5)
WBC # FLD AUTO: 6.54 K/UL — SIGNIFICANT CHANGE UP (ref 3.8–10.5)

## 2020-10-21 PROCEDURE — 99239 HOSP IP/OBS DSCHRG MGMT >30: CPT

## 2020-10-21 PROCEDURE — 99232 SBSQ HOSP IP/OBS MODERATE 35: CPT | Mod: GC

## 2020-10-21 PROCEDURE — 99233 SBSQ HOSP IP/OBS HIGH 50: CPT

## 2020-10-21 RX ORDER — ISOPROPYL ALCOHOL, BENZOCAINE .7; .06 ML/ML; ML/ML
1 SWAB TOPICAL
Qty: 100 | Refills: 1
Start: 2020-10-21 | End: 2020-12-09

## 2020-10-21 RX ORDER — SIMVASTATIN 20 MG/1
1 TABLET, FILM COATED ORAL
Qty: 0 | Refills: 0 | DISCHARGE

## 2020-10-21 RX ORDER — ENOXAPARIN SODIUM 100 MG/ML
24 INJECTION SUBCUTANEOUS
Qty: 1 | Refills: 0
Start: 2020-10-21

## 2020-10-21 RX ORDER — ATORVASTATIN CALCIUM 80 MG/1
1 TABLET, FILM COATED ORAL
Qty: 30 | Refills: 0
Start: 2020-10-21 | End: 2020-11-19

## 2020-10-21 RX ORDER — SITAGLIPTIN 50 MG/1
1 TABLET, FILM COATED ORAL
Qty: 0 | Refills: 0 | DISCHARGE

## 2020-10-21 RX ORDER — INSULIN GLARGINE 100 [IU]/ML
24 INJECTION, SOLUTION SUBCUTANEOUS
Qty: 1 | Refills: 0
Start: 2020-10-21

## 2020-10-21 RX ADMIN — ENOXAPARIN SODIUM 40 MILLIGRAM(S): 100 INJECTION SUBCUTANEOUS at 12:31

## 2020-10-21 RX ADMIN — Medication 10 UNIT(S): at 10:12

## 2020-10-21 RX ADMIN — Medication 50 MILLIGRAM(S): at 05:02

## 2020-10-21 RX ADMIN — PREGABALIN 1000 MICROGRAM(S): 225 CAPSULE ORAL at 12:31

## 2020-10-21 RX ADMIN — Medication 50 MILLIGRAM(S): at 13:16

## 2020-10-21 RX ADMIN — Medication 10 UNIT(S): at 13:15

## 2020-10-21 RX ADMIN — AMLODIPINE BESYLATE 10 MILLIGRAM(S): 2.5 TABLET ORAL at 05:02

## 2020-10-21 RX ADMIN — PANTOPRAZOLE SODIUM 40 MILLIGRAM(S): 20 TABLET, DELAYED RELEASE ORAL at 05:01

## 2020-10-21 RX ADMIN — Medication 81 MILLIGRAM(S): at 12:31

## 2020-10-21 RX ADMIN — Medication 2: at 10:12

## 2020-10-21 NOTE — DISCHARGE NOTE PROVIDER - HOSPITAL COURSE
69 y/o Korean speaking female, with a PmHx of HTN, HLD, DM, presented to the [facility] ED c/o chest pain, abd pain and sob. Admitted to telemetry for r/o acs and for a rectal wall thickening r/o proctitis and an adenomyomatosis or fundal gallbladder wall calcification r/o porcelain gallbladder. She was also found to have an elevated Lipase and Lactate levels.  Chest pain, -Troponin 7-->6 -EKG without ischemic changes -TTE with no significant abnormalities -No events noted on telemetry.      Problem/Plan - 2:  ·  Problem: Abdominal pain.  Plan: CT abd/pelvis with findings of adenomyomatosis or fundal gallbladder wall calcification  US abd with normal appearance of GB  AST/ALT elevated, likely 2/2 diffuse hepatic steatosis seen on US abd  Rectal wall thickening, 10/20 inpatient flex-sig today- rectal thickening biopsy obtained - fu outpatient     Type 2 diabetes mellitus with hyperglycemia, without long-term current use of insulin.  Plan: A1c 9.3%  Endocrine following  Continue Lantus 24u qHS, restart Humalog 10 units qac post EGD   Continue with FS qac and qHS  Moderate dose ISS  Holding home oral diabetic medications   Likely d/c on basal insulin and PO meds as per Endo - basaglar 24u sq daily at 1800, with metformin 1000mg po bid  Diabetic teaching.     HLD (hyperlipidemia). C/w simvastatin, dose lowered to 20mg po daily    dispo: home with home care 71 y/o Slovak speaking female, with a PmHx of HTN, HLD, DM, presented to the [facility] ED c/o chest pain, abd pain and sob. Troponins were negative, EKG w/o ischemic changes. No events on telemetry and TTE w/ no significant abnormalities. Etiology of chest pain unclear but likely GI related. CT abdomen showed rectal wall thickening and adenomyomatosis or fundal gallbladder wall calcification. Etiology of abdominal pain unclear. Abdominal US w/ normal appearance of gallbladder. Elevated LFT's likely d/t diffuse hepatic steatosis. Rectal wall thickening, sigmoidoscopy showed erythematous and eroded mucosa in rectum s/p biopsy, possible sequela of enema. DM2 w/ hyperglycemia, A1c 9.3. Seen by Endo and managed w/ basal/bolus regimen. Patient was discharged on Lantus and Metformin. Hospital course also complicated by urinary retention, [place] placed and was later removed, patient passed TOV.

## 2020-10-21 NOTE — DISCHARGE NOTE PROVIDER - PROVIDER TOKENS
FREE:[LAST:[Dr. Hernandez],PHONE:[(   )    -],FAX:[(   )    -],SCHEDULEDAPPT:[10/26/2020],SCHEDULEDAPPTTIME:[10:00 AM]]

## 2020-10-21 NOTE — CHART NOTE - NSCHARTNOTEFT_GEN_A_CORE
Informed by nurse that patient fingerstick was 445. Patient asymptomatic. Patient received 8 units of Humalog and had received 24 units of Lantus at 6 PM. Repeat fingerstick 217. Will continue monitoring.

## 2020-10-21 NOTE — PROGRESS NOTE ADULT - SUBJECTIVE AND OBJECTIVE BOX
Chief Complaint:  Patient is a 70y old  Female who presents with a chief complaint of Chest Pain (20 Oct 2020 15:07)      Interval Events: No acute overnight events. Pt hasnt had any BMs.   ROS: All 12 point system except listed above were otherwise negative.    Allergies:  No Known Allergies        Hospital Medications:  acetaminophen   Tablet .. 650 milliGRAM(s) Oral every 6 hours PRN  amLODIPine   Tablet 10 milliGRAM(s) Oral daily  aspirin enteric coated 81 milliGRAM(s) Oral daily  atorvastatin 20 milliGRAM(s) Oral at bedtime  cyanocobalamin 1000 MICROGram(s) Oral daily  dextrose 40% Gel 15 Gram(s) Oral once PRN  dextrose 5%. 1000 milliLiter(s) IV Continuous <Continuous>  dextrose 50% Injectable 12.5 Gram(s) IV Push once  dextrose 50% Injectable 25 Gram(s) IV Push once  dextrose 50% Injectable 25 Gram(s) IV Push once  enoxaparin Injectable 40 milliGRAM(s) SubCutaneous daily  glucagon  Injectable 1 milliGRAM(s) IntraMuscular once PRN  insulin glargine Injectable (LANTUS) 24 Unit(s) SubCutaneous <User Schedule>  insulin lispro (HumaLOG) corrective regimen sliding scale   SubCutaneous three times a day before meals  insulin lispro (HumaLOG) corrective regimen sliding scale   SubCutaneous at bedtime  insulin lispro Injectable (HumaLOG) 10 Unit(s) SubCutaneous three times a day before meals  metoprolol tartrate 50 milliGRAM(s) Oral three times a day  pantoprazole    Tablet 40 milliGRAM(s) Oral before breakfast  simethicone 80 milliGRAM(s) Chew every 6 hours PRN      PMHX/PSHX:  HLD (hyperlipidemia)    DM (diabetes mellitus)    HTN (hypertension)    No significant past surgical history        Family history:  Family history of hypertension    No pertinent family history in first degree relatives      There is no family history of peptic ulcer disease, gastric cancer, colon polyps, colon cancer, celiac disease, biliary, hepatic, or pancreatic disease.  None of the female relatives have breast, uterine, or ovarian cancer.     PHYSICAL EXAM:   Vital Signs:  Vital Signs Last 24 Hrs  T(C): 36.3 (21 Oct 2020 04:59), Max: 37.1 (20 Oct 2020 14:57)  T(F): 97.4 (21 Oct 2020 04:59), Max: 98.7 (20 Oct 2020 14:57)  HR: 65 (21 Oct 2020 04:59) (65 - 80)  BP: 120/58 (21 Oct 2020 04:59) (120/58 - 149/74)  BP(mean): --  RR: 17 (21 Oct 2020 04:59) (17 - 24)  SpO2: 100% (21 Oct 2020 04:59) (94% - 100%)  Daily Height in cm: 152.4 (20 Oct 2020 14:57)    Daily     PHYSICAL EXAM:     GENERAL: Elderly, well developed, no acute distress   HEENT:  Conjunctivae clear, sclera anicteric  CHEST:  No increased effort w/ respirations  HEART:  Regular rhythm & rate  ABDOMEN:  Soft, obese, non-distended, non-tender  EXTREMITIES:  Warm, well perfused, no edema  SKIN:  No rash/erythema/ecchymoses/jaundice   NEURO:  Alert, orientedx3    LABS:                        11.9   6.54  )-----------( 252      ( 21 Oct 2020 05:42 )             37.9     Mean Cell Volume: 75.6 fL (10-21-20 @ 05:42)    10-21    142  |  104  |  8   ----------------------------<  92  4.0   |  28  |  0.58    Ca    9.8      21 Oct 2020 05:42  Phos  4.6     10-21  Mg     1.9     10-21                                    11.9   6.54  )-----------( 252      ( 21 Oct 2020 05:42 )             37.9                         11.6   7.13  )-----------( 238      ( 20 Oct 2020 03:10 )             36.7                         11.5   8.09  )-----------( 236      ( 19 Oct 2020 05:42 )             35.5     Imaging:      < from: Flexible Sigmoidoscopy (10.20.20 @ 14:51) >  Findings:       The perianal and digital rectal examinations were normal.       A small localized area of mildly erythematous and eroded mucosa was        found in the rectum surrounded by slightly erythematous mucosa. Biopsies        were taken with a cold forceps for histology.       Non-bleeding internal hemorrhoids were found during retroflexion. The        hemorrhoids were large.                                                                                Impression:          - Erythematous and eroded mucosa in the rectum.                        Biopsied. Possible sequela of enema.                       - Non-bleeding internal hemorrhoids.  Recommendation:      - Return patient to [facility] for ongoing care.                       - Resume regular diet.                       - Await pathology results.                Attending Participation:       I was present and participated during the entire procedure, including        non-key portions.    < end of copied text >         Chief Complaint:  Patient is a 70y old  Female who presents with a chief complaint of Chest Pain (20 Oct 2020 15:07)    Interval Events: No acute overnight events. Pt hasnt had any BMs.   ROS: All 12 point system except listed above were otherwise negative.    Allergies:  No Known Allergies    Hospital Medications:  acetaminophen   Tablet .. 650 milliGRAM(s) Oral every 6 hours PRN  amLODIPine   Tablet 10 milliGRAM(s) Oral daily  aspirin enteric coated 81 milliGRAM(s) Oral daily  atorvastatin 20 milliGRAM(s) Oral at bedtime  cyanocobalamin 1000 MICROGram(s) Oral daily  dextrose 40% Gel 15 Gram(s) Oral once PRN  dextrose 5%. 1000 milliLiter(s) IV Continuous <Continuous>  dextrose 50% Injectable 12.5 Gram(s) IV Push once  dextrose 50% Injectable 25 Gram(s) IV Push once  dextrose 50% Injectable 25 Gram(s) IV Push once  enoxaparin Injectable 40 milliGRAM(s) SubCutaneous daily  glucagon  Injectable 1 milliGRAM(s) IntraMuscular once PRN  insulin glargine Injectable (LANTUS) 24 Unit(s) SubCutaneous <User Schedule>  insulin lispro (HumaLOG) corrective regimen sliding scale   SubCutaneous three times a day before meals  insulin lispro (HumaLOG) corrective regimen sliding scale   SubCutaneous at bedtime  insulin lispro Injectable (HumaLOG) 10 Unit(s) SubCutaneous three times a day before meals  metoprolol tartrate 50 milliGRAM(s) Oral three times a day  pantoprazole    Tablet 40 milliGRAM(s) Oral before breakfast  simethicone 80 milliGRAM(s) Chew every 6 hours PRN      PMHX/PSHX:  HLD (hyperlipidemia)    DM (diabetes mellitus)    HTN (hypertension)    No significant past surgical history        Family history:  Family history of hypertension    No pertinent family history in first degree relatives      There is no family history of peptic ulcer disease, gastric cancer, colon polyps, colon cancer, celiac disease, biliary, hepatic, or pancreatic disease.  None of the female relatives have breast, uterine, or ovarian cancer.     PHYSICAL EXAM:   Vital Signs:  Vital Signs Last 24 Hrs  T(C): 36.3 (21 Oct 2020 04:59), Max: 37.1 (20 Oct 2020 14:57)  T(F): 97.4 (21 Oct 2020 04:59), Max: 98.7 (20 Oct 2020 14:57)  HR: 65 (21 Oct 2020 04:59) (65 - 80)  BP: 120/58 (21 Oct 2020 04:59) (120/58 - 149/74)  BP(mean): --  RR: 17 (21 Oct 2020 04:59) (17 - 24)  SpO2: 100% (21 Oct 2020 04:59) (94% - 100%)  Daily Height in cm: 152.4 (20 Oct 2020 14:57)    Daily     PHYSICAL EXAM:     GENERAL: Elderly, well developed, no acute distress   HEENT:  Conjunctivae clear, sclera anicteric  CHEST:  No increased effort w/ respirations  HEART:  Regular rhythm & rate  ABDOMEN:  Soft, obese, non-distended, non-tender  EXTREMITIES:  Warm, well perfused, no edema  SKIN:  No rash/erythema/ecchymoses/jaundice   NEURO:  Alert, orientedx3    LABS:                        11.9   6.54  )-----------( 252      ( 21 Oct 2020 05:42 )             37.9     Mean Cell Volume: 75.6 fL (10-21-20 @ 05:42)    10-21    142  |  104  |  8   ----------------------------<  92  4.0   |  28  |  0.58    Ca    9.8      21 Oct 2020 05:42  Phos  4.6     10-21  Mg     1.9     10-21                                    11.9   6.54  )-----------( 252      ( 21 Oct 2020 05:42 )             37.9                         11.6   7.13  )-----------( 238      ( 20 Oct 2020 03:10 )             36.7                         11.5   8.09  )-----------( 236      ( 19 Oct 2020 05:42 )             35.5     Imaging:      < from: Flexible Sigmoidoscopy (10.20.20 @ 14:51) >  Findings:       The perianal and digital rectal examinations were normal.       A small localized area of mildly erythematous and eroded mucosa was        found in the rectum surrounded by slightly erythematous mucosa. Biopsies        were taken with a cold forceps for histology.       Non-bleeding internal hemorrhoids were found during retroflexion. The        hemorrhoids were large.                                                                                Impression:          - Erythematous and eroded mucosa in the rectum.                        Biopsied. Possible sequela of enema.                       - Non-bleeding internal hemorrhoids.  Recommendation:      - Return patient to hospital [facility] for ongoing care.                       - Resume regular diet.                       - Await pathology results.                Attending Participation:       I was present and participated during the entire procedure, including        non-key portions.    < end of copied text >

## 2020-10-21 NOTE — DISCHARGE NOTE PROVIDER - NSFOLLOWUPCLINICS_GEN_ALL_ED_FT
[facility]  Gastroenterology  [address] [place] [id number]  Phone: [phone]  Fax:   Follow Up Time:      [facility]  Gastroenterology  [address] [place] [address] [address]  [place] [address]  Phone: [phone]  Fax:     [facility] [place]  Internal Medicine  [address] [place] [address]  Phone: [phone]  Fax: [phone]  Follow Up Time:

## 2020-10-21 NOTE — PROGRESS NOTE ADULT - PROBLEM SELECTOR PROBLEM 2
Essential hypertension
Abdominal pain

## 2020-10-21 NOTE — PROGRESS NOTE ADULT - ASSESSMENT
70 y.o. female with h/o type 2 DM uncontrolled, HTN , hyperlipidemia presents with SOB, CP and abdominal pain. Endocrine consulted for uncontrolled DM 2 (HbA1c 9.3%)    1. Uncontrolled type 2 diabetes mellitus with hyperglycemia  -HbA1c uncontrolled at 9.3%, patient is on oral agents only at home (Metformin, Glipizide, Januvia), also endorsing high carb diet. Goal A1c approximately 7%   -Inpatient BG target 100-180 mg/dl. Tight control this AM to 96 mg/dl. While inpatient, will recommend lower dose of basal insulin but since she will be discharged on basal/oral regimen, can retain dose of Lantus 24 units for discharge.  While inpatient:  -Reduce Lantus to 20 units SQ daily at 6 PM   -Continue Humalog 10 units SQ TID before meals (Hold if not eating)   -Continue Humalog moderate dose correctional scale SQ TID before meals and Humalog moderate dose bedtime correctional scale SQ qHS   -Consistent carbohydrate diet, RD consultation done  -Check BG before meals and bedtime  -DM education: Insulin pen and glucometer education was started with patient/son on 10/19/20. See HPI, patient performed return demonstration with NP prompting today 10/21/20. Would recommend family assist patient when she returns home, as she is anxious regarding self-injection.    Discharge Plan:  -Recommend for discharge today: Lantus Solostar PEN 24 units SQ daily at 6 PM and Metformin 1000 mg PO BID (take with food)  -STOP Glipizide, STOP Januvia  -Given elevated lipase and GI symptoms, avoid GLP-1 analogs and DDP-IV inhibitors.   -Ensure patient has supplies including glucometer, glucose test strips, lancets, alcohol swabs and insulin PEN needles  -Patient with Medicaid insurance - recommend followup with [facility] at [place], Endocrine Clinic: [id number] [facility] [address], [phone]  Endocrine appointment scheduled January 12, 2021 at 9:00 AM    2. Essential hypertension  -BP goal is <130/80  -Management as per primary team.     3. Other hyperlipidemia  -LDL target less than 70  -Currently on Simvastatin 40 mg daily. Recommend switching to Atorvastatin as patient is concurrently on Amlodipine for BP    Reviewed DM discharge plan with primary team (ACP)  Sharon Cole  Nurse Practitioner  Division of Endocrinology & Diabetes  In house pager #[phone]/long range pager #[phone]    If before 9AM or after 6PM, or on weekends/holidays, please call endocrine answering service for assistance ([phone]).  For nonurgent matters email [email or web address] for assistance. 70 y.o. female with h/o type 2 DM uncontrolled, HTN , hyperlipidemia presents with SOB, CP and abdominal pain. Endocrine consulted for uncontrolled DM 2 (HbA1c 9.3%)    1. Uncontrolled type 2 diabetes mellitus with hyperglycemia  -HbA1c uncontrolled at 9.3%, patient is on oral agents only at home (Metformin, Glipizide, Januvia), also endorsing high carb diet. Goal A1c approximately 7%   -Inpatient BG target 100-180 mg/dl. Tight control this AM to 96 mg/dl. While inpatient, will recommend lower dose of basal insulin but since she will be discharged on basal/oral regimen, can retain dose of Lantus 24 units for discharge.  While inpatient:  -Reduce Lantus to 20 units SQ daily at 6 PM   -Continue Humalog 10 units SQ TID before meals (Hold if not eating)   -Continue Humalog moderate dose correctional scale SQ TID before meals and Humalog moderate dose bedtime correctional scale SQ qHS   -Consistent carbohydrate diet, RD consultation done  -Check BG before meals and bedtime  -DM education: Insulin pen and glucometer education was started with patient/son on 10/19/20. See HPI, patient performed return demonstration with NP prompting today 10/21/20. Would recommend family assist patient when she returns home, as she is anxious regarding self-injection.    Discharge Plan:  -Recommend for discharge today: Lantus Solostar PEN 24 units SQ daily at 6 PM and Metformin 1000 mg PO BID (take with food)  -STOP Glipizide, STOP Januvia  -Given elevated lipase and GI symptoms, avoid GLP-1 analogs and DDP-IV inhibitors.   -Ensure patient has supplies including glucometer, glucose test strips, lancets, alcohol swabs and insulin PEN needles  -Patient with Medicaid insurance - recommend followup with [facility] at [place], Endocrine Clinic: [phone] [facility] [address], [phone]  Endocrine appointment scheduled January 12, 2021 at 9:00 AM    2. Essential hypertension  -BP goal is <130/80  -Management as per primary team.     3. Other hyperlipidemia  -LDL target less than 70  -Currently on Simvastatin 40 mg daily. Recommend switching to Atorvastatin as patient is concurrently on Amlodipine for BP    Reviewed DM discharge plan with primary team (ACP)  Greater than 35 minutes spent in assessment, coordination of care and education, including face to face educational session with patient on insulin pen/DM ED  Sharon Cole  Nurse Practitioner  Division of Endocrinology & Diabetes  In house pager #[phone]/long range pager #[phone]    If before 9AM or after 6PM, or on weekends/holidays, please call endocrine answering service for assistance ([phone]).  For nonurgent matters email [email or web address] for assistance.

## 2020-10-21 NOTE — PROGRESS NOTE ADULT - ATTENDING COMMENTS
Plan for flex sig tomorrow to evaluate CT abnormalities of rectal thickening.
DC home, d/c time 32 minutes

## 2020-10-21 NOTE — DISCHARGE NOTE PROVIDER - NSDCCPCAREPLAN_GEN_ALL_CORE_FT
PRINCIPAL DISCHARGE DIAGNOSIS  Diagnosis: Abdominal pain  Assessment and Plan of Treatment: You were evaluated by gastroenterology and underwent a flexible signoidoscopy- your rectal wall was oted thickend and was biopsied  Follow up with Gastroenterology outpatient for biopsy results.      SECONDARY DISCHARGE DIAGNOSES  Diagnosis: Type 2 diabetes mellitus with hyperglycemia, without long-term current use of insulin  Assessment and Plan of Treatment: Continue consistent carbohydrate diet.  Monitor blood glucose levels throughout the day before meals and at bedtime.  Record blood sugars and bring to outpatient providers appointment in order to be reviewed by your doctor for management modifications.  Be aware of diabetes management symptoms including feeling cool and clammy may be related to low glucose levels.  Feeling hot and dry may indicate high glucose levels.  If  you feel these symptoms, check your blood sugar.  Make regular podiatry appointments in order to have feet checked for wounds and toe nails cut by a doctor to prevent infections.      Diagnosis: Abdominal pain  Assessment and Plan of Treatment: Abdominal pain    Diagnosis: Essential hypertension  Assessment and Plan of Treatment: Continue blood pressure medication regimen as directed. Monitor for any visual changes, headaches or dizziness.  Monitor blood pressure regularly.  Follow up with your PCP for further management for high blood pressure.      Diagnosis: Other hyperlipidemia  Assessment and Plan of Treatment: Continue cholesterol control medications. Continue DASH diet. Follow up with your PCP within 1 week of discharge for further management and monitoring of lipid and cholesterol panels.       PRINCIPAL DISCHARGE DIAGNOSIS  Diagnosis: Abdominal pain  Assessment and Plan of Treatment: You were evaluated by gastroenterology and underwent a flexible signoidoscopy- your rectal wall was oted thickend and was biopsied  Follow up with Gastroenterology outpatient for biopsy results.      SECONDARY DISCHARGE DIAGNOSES  Diagnosis: Type 2 diabetes mellitus with hyperglycemia, without long-term current use of insulin  Assessment and Plan of Treatment: Follow up at endocrine clinic January 12th as scheduled  Continue basaglar in sulin 24units at 6pm   Continue metformin 1000mg po twice a day  Continue consistent carbohydrate diet.  Monitor blood glucose levels throughout the day before meals and at bedtime.  Record blood sugars and bring to outpatient providers appointment in order to be reviewed by your doctor for management modifications.  Be aware of diabetes management symptoms including feeling cool and clammy may be related to low glucose levels.  Feeling hot and dry may indicate high glucose levels.  If  you feel these symptoms, check your blood sugar.  Make regular podiatry appointments in order to have feet checked for wounds and toe nails cut by a doctor to prevent infections.      Diagnosis: Abdominal pain  Assessment and Plan of Treatment: Abdominal pain    Diagnosis: Essential hypertension  Assessment and Plan of Treatment: Continue blood pressure medication regimen as directed. Monitor for any visual changes, headaches or dizziness.  Monitor blood pressure regularly.  Follow up with your PCP for further management for high blood pressure.      Diagnosis: Other hyperlipidemia  Assessment and Plan of Treatment: Continue cholesterol control medications. Continue DASH diet. Follow up with your PCP within 1 week of discharge for further management and monitoring of lipid and cholesterol panels.

## 2020-10-21 NOTE — PROGRESS NOTE ADULT - PROBLEM SELECTOR PLAN 2
CT abd/pelvis with findings of adenomyomatosis or fundal gallbladder wall calcification  US abd with normal appearance of GB  AST/ALT elevated, likely 2/2 diffuse hepatic steatosis seen on US abd  Rectal wall thickening s/p flex-sig 10/21 showing erythematous and eroded mucosa in rectum, f/up biopsy results as outpatient   No further GI w/up at this time

## 2020-10-21 NOTE — PROGRESS NOTE ADULT - ASSESSMENT
70 year old Spanish-speaking female with DM2, HTN and HLD presenting with chest pain and abdominal pain.    #Abnormal CT with rectal wall thickening: S/P flexible sigmoidoscopy revealing erythematous and eroded mucosa in rectum and non bleeding internal hemorrhoids, biopsies obtained  #Gallbladder adenomyomatosis vs. fundal wall calcification  #Acute on chronic abdominal pain: Differential includes uncontrolled hyperglycemia vs. gastritis vs. functional abdominal pain. NM gastric-emptying study and CT in 05/2019 unremarkable. EGD in 05/2018 with esophagitis and gastritis. Abdominal pain has resolved.   #Type 2 diabetes mellitus with hyperglycemia on admission     Recommendations:  - diet as tolerated  - follow up pathology  - improved glycemic control   - daily oral PPI    Aj Camargo, PGY6  Gastroenterology Fellow  Pager # [phone] / [phone]  Can be contacted via [employer] Teams 70 year old Hungarian-speaking female with DM2, HTN and HLD presenting with chest pain and abdominal pain.    #Abnormal CT with rectal wall thickening: S/P flexible sigmoidoscopy revealing erythematous and eroded mucosa in rectum and non bleeding internal hemorrhoids, biopsies obtained  #Gallbladder adenomyomatosis vs. fundal wall calcification on CT though normal appearance on US  #Acute on chronic abdominal pain: Differential includes uncontrolled hyperglycemia vs. gastritis vs. functional abdominal pain. NM gastric-emptying study and CT in 05/2019 unremarkable. EGD in 05/2018 with esophagitis and gastritis. Abdominal pain has resolved.   #Type 2 diabetes mellitus with hyperglycemia on admission     Recommendations:  - diet as tolerated  - follow up pathology  - improved glycemic control   - daily oral PPI    Aj Camargo, PGY6  Gastroenterology Fellow  Pager # [phone] / [phone]  Can be contacted via [employer] Teams

## 2020-10-21 NOTE — DISCHARGE NOTE NURSING/CASE MANAGEMENT/SOCIAL WORK - PATIENT PORTAL LINK FT
You can access the FollowMyHealth Patient Portal offered by [facility] by registering at the following website: [email or web address]. By joining [employer]’s FollowMyHealth portal, you will also be able to view your health information using other applications (apps) compatible with our system.

## 2020-10-21 NOTE — DISCHARGE NOTE PROVIDER - NSDCMRMEDTOKEN_GEN_ALL_CORE_FT
amLODIPine 10 mg oral tablet: 1 tab(s) orally once a day  aspirin 81 mg oral tablet: 1 tab(s) orally once a day  glipiZIDE 10 mg oral tablet: 1 tab(s) orally once a day  Januvia 100 mg oral tablet: 1 tab(s) orally once a day  metFORMIN 1000 mg oral tablet: 1 tab(s) orally 2 times a day   Metoprolol Tartrate 50 mg oral tablet: 1 tab(s) orally 3 times a day  pantoprazole 40 mg oral delayed release tablet: 1 tab(s) orally once a day (before a meal)  simvastatin 40 mg oral tablet: 1 tab(s) orally once a day (at bedtime)  Vitamin B12 1000 mcg oral tablet: 1 tab(s) orally once a day   alcohol swabs : Apply topically to affected area 4 times a day   amLODIPine 10 mg oral tablet: 1 tab(s) orally once a day  aspirin 81 mg oral tablet: 1 tab(s) orally once a day  atorvastatin 20 mg oral tablet: 1 tab(s) orally once a day (at bedtime)  Basaglar KwikPen 100 units/mL subcutaneous solution: 24 unit(s) subcutaneous once a day at 6PM  Insulin Pen Needles, 4mm: 1 application subcutaneously 4 times a day. ** Use with insulin pen **   metFORMIN 1000 mg oral tablet: 1 tab(s) orally 2 times a day   Metoprolol Tartrate 50 mg oral tablet: 1 tab(s) orally 3 times a day  pantoprazole 40 mg oral delayed release tablet: 1 tab(s) orally once a day (before a meal)  Vitamin B12 1000 mcg oral tablet: 1 tab(s) orally once a day   alcohol swabs : Apply topically to affected area 4 times a day   amLODIPine 10 mg oral tablet: 1 tab(s) orally once a day  aspirin 81 mg oral tablet: 1 tab(s) orally once a day  atorvastatin 20 mg oral tablet: 1 tab(s) orally once a day (at bedtime)  Insulin Pen Needles, 4mm: 1 application subcutaneously 4 times a day. ** Use with insulin pen **   Lantus Solostar Pen 100 units/mL subcutaneous solution: 24 unit(s) subcutaneous once a day at 6pm   metFORMIN 1000 mg oral tablet: 1 tab(s) orally 2 times a day   Metoprolol Tartrate 50 mg oral tablet: 1 tab(s) orally 3 times a day  pantoprazole 40 mg oral delayed release tablet: 1 tab(s) orally once a day (before a meal)  Vitamin B12 1000 mcg oral tablet: 1 tab(s) orally once a day

## 2020-10-21 NOTE — PROGRESS NOTE ADULT - PROBLEM SELECTOR PLAN 3
A1c 9.3%  Endocrine following  On Lantus 24u qHS and Humalog 10 units qac   Moderate dose ISS  Monitor fingersticks   Plan to d/c on Lantus and Metformin as per Endo

## 2020-10-21 NOTE — DISCHARGE NOTE NURSING/CASE MANAGEMENT/SOCIAL WORK - NSSCNAMETXT_GEN_ALL_CORE
1. EVA AT HOME for Registered Nurse day after d/c;    2.CENTERS PLAN for reinstatement of cdpass aide as per prior to admission

## 2020-10-21 NOTE — PROGRESS NOTE ADULT - SUBJECTIVE AND OBJECTIVE BOX
Chief Complaint: DM 2    History: Patient seen at bedside. Communicated via [employer] ID # [id number] to French  Patient reports she is feeling better today. Eating meals. Denies n/v, denies s/s of hypoglycemia.     Per primary team, patient is planned for discharge today. Our team has previously reviewed insulin PEN technique with patient's son, as patient was requesting family assistance in injecting insulin. He was unable to visit hospital today for reinforcement. Patient also reporting to our team that her  uses insulin pens and glucometer and has been helping her check blood glucose at home. Reviewed plan with patient today - completed educational session with  on BG targets, hypoglycemia recognition and treatment, insulin PEN use and followup. Patient was able to teach back DM discharge plan successfully. She also performed return demonstration of insulin PEN use, with NP prompting.    MEDICATIONS  (STANDING):  amLODIPine   Tablet 10 milliGRAM(s) Oral daily  aspirin enteric coated 81 milliGRAM(s) Oral daily  atorvastatin 20 milliGRAM(s) Oral at bedtime  cyanocobalamin 1000 MICROGram(s) Oral daily  dextrose 5%. 1000 milliLiter(s) (50 mL/Hr) IV Continuous <Continuous>  dextrose 50% Injectable 12.5 Gram(s) IV Push once  dextrose 50% Injectable 25 Gram(s) IV Push once  dextrose 50% Injectable 25 Gram(s) IV Push once  enoxaparin Injectable 40 milliGRAM(s) SubCutaneous daily  insulin glargine Injectable (LANTUS) 24 Unit(s) SubCutaneous <User Schedule>  insulin lispro (HumaLOG) corrective regimen sliding scale   SubCutaneous three times a day before meals  insulin lispro (HumaLOG) corrective regimen sliding scale   SubCutaneous at bedtime  insulin lispro Injectable (HumaLOG) 10 Unit(s) SubCutaneous three times a day before meals  metoprolol tartrate 50 milliGRAM(s) Oral three times a day  pantoprazole    Tablet 40 milliGRAM(s) Oral before breakfast    MEDICATIONS  (PRN):  acetaminophen   Tablet .. 650 milliGRAM(s) Oral every 6 hours PRN Temp greater or equal to 38C (100.4F), Mild Pain (1 - 3), Moderate Pain (4 - 6)  dextrose 40% Gel 15 Gram(s) Oral once PRN Blood Glucose LESS THAN 70 milliGRAM(s)/deciliter  glucagon  Injectable 1 milliGRAM(s) IntraMuscular once PRN Glucose LESS THAN 70 milligrams/deciliter  simethicone 80 milliGRAM(s) Chew every 6 hours PRN Indigestion    No Known Allergies    Review of Systems:  HEENT: No pain  Cardiovascular: No chest pain  Respiratory: No SOB  GI: No nausea, vomiting    PHYSICAL EXAM:  VITALS: T(C): 36.5 (10-21-20 @ 12:43)  T(F): 97.7 (10-21-20 @ 12:43), Max: 98 (10-20-20 @ 20:01)  HR: 73 (10-21-20 @ 12:43) (65 - 79)  BP: 129/61 (10-21-20 @ 12:43) (120/58 - 148/75)  RR:  (17 - 24)  SpO2:  (94% - 100%)  Wt(kg): --  GENERAL: NAD  EYES: No proptosis, no lid lag, anicteric  HEENT:  Atraumatic, Normocephalic, moist mucous membranes  RESPIRATORY: unlabored respirations   PSYCH: Alert and oriented x 3, normal affect, normal mood    CAPILLARY BLOOD GLUCOSE    POCT Blood Glucose.: 129 mg/dL (21 Oct 2020 12:30)  POCT Blood Glucose.: 200 mg/dL (21 Oct 2020 10:05)  POCT Blood Glucose.: 96 mg/dL (21 Oct 2020 08:19)  POCT Blood Glucose.: 217 mg/dL (20 Oct 2020 23:54)  POCT Blood Glucose.: 445 mg/dL (20 Oct 2020 22:26)  POCT Blood Glucose.: 96 mg/dL (20 Oct 2020 17:42)      10-21    142  |  104  |  8   ----------------------------<  92  4.0   |  28  |  0.58    EGFR if : 108  EGFR if non : 93    Ca    9.8      10-21  Mg     1.9     10-21  Phos  4.6     10-21    TPro  6.9  /  Alb  4.0  /  TBili  0.7  /  DBili  x   /  AST  77<H>  /  ALT  58<H>  /  AlkPhos  64  10-19      Thyroid Function Tests:  10-17 @ 08:27 TSH 3.86 FreeT4 -- T3 -- Anti TPO -- Anti Thyroglobulin Ab -- TSI --      A1C with Estimated Average Glucose: 9.3 % (10-17-20 @ 08:27)

## 2020-10-21 NOTE — PROGRESS NOTE ADULT - ASSESSMENT
69 y/o Italian speaking female, with a PmHx of HTN, HLD, DM, presented to the [facility] ED c/o chest pain, abd pain and sob. Admitted to telemetry for r/o acs and for a rectal wall thickening r/o proctitis and an adenomyomatosis or fundal gallbladder wall calcification r/o porcelain gallbladder.

## 2020-10-21 NOTE — PROGRESS NOTE ADULT - PROBLEM SELECTOR PROBLEM 3
Other hyperlipidemia
Type 2 diabetes mellitus with hyperglycemia, without long-term current use of insulin

## 2020-10-22 LAB — SURGICAL PATHOLOGY STUDY: SIGNIFICANT CHANGE UP

## 2020-11-09 ENCOUNTER — EMERGENCY (EMERGENCY)
Facility: HOSPITAL | Age: 70
LOS: 1 days | Discharge: HOME | End: 2020-11-09
Attending: EMERGENCY MEDICINE | Admitting: EMERGENCY MEDICINE
Payer: MEDICAID

## 2020-11-09 VITALS
HEART RATE: 74 BPM | HEIGHT: 60 IN | RESPIRATION RATE: 18 BRPM | DIASTOLIC BLOOD PRESSURE: 64 MMHG | OXYGEN SATURATION: 100 % | SYSTOLIC BLOOD PRESSURE: 127 MMHG | TEMPERATURE: 98 F

## 2020-11-09 LAB
ALBUMIN SERPL ELPH-MCNC: 4.4 G/DL — SIGNIFICANT CHANGE UP (ref 3.3–5)
ALP SERPL-CCNC: 60 U/L — SIGNIFICANT CHANGE UP (ref 40–120)
ALT FLD-CCNC: 33 U/L — SIGNIFICANT CHANGE UP (ref 4–33)
ANION GAP SERPL CALC-SCNC: 12 MMO/L — SIGNIFICANT CHANGE UP (ref 7–14)
APTT BLD: 31.6 SEC — SIGNIFICANT CHANGE UP (ref 27–36.3)
AST SERPL-CCNC: 42 U/L — HIGH (ref 4–32)
BASE EXCESS BLDV CALC-SCNC: 1 MMOL/L — SIGNIFICANT CHANGE UP
BASOPHILS # BLD AUTO: 0.04 K/UL — SIGNIFICANT CHANGE UP (ref 0–0.2)
BASOPHILS NFR BLD AUTO: 0.5 % — SIGNIFICANT CHANGE UP (ref 0–2)
BILIRUB SERPL-MCNC: 0.6 MG/DL — SIGNIFICANT CHANGE UP (ref 0.2–1.2)
BLOOD GAS VENOUS - CREATININE: 0.52 MG/DL — SIGNIFICANT CHANGE UP (ref 0.5–1.3)
BLOOD GAS VENOUS - FIO2: 21 — SIGNIFICANT CHANGE UP
BUN SERPL-MCNC: 8 MG/DL — SIGNIFICANT CHANGE UP (ref 7–23)
CALCIUM SERPL-MCNC: 9.2 MG/DL — SIGNIFICANT CHANGE UP (ref 8.4–10.5)
CHLORIDE BLDV-SCNC: 103 MMOL/L — SIGNIFICANT CHANGE UP (ref 96–108)
CHLORIDE SERPL-SCNC: 100 MMOL/L — SIGNIFICANT CHANGE UP (ref 98–107)
CO2 SERPL-SCNC: 26 MMOL/L — SIGNIFICANT CHANGE UP (ref 22–31)
CREAT SERPL-MCNC: 0.53 MG/DL — SIGNIFICANT CHANGE UP (ref 0.5–1.3)
EOSINOPHIL # BLD AUTO: 0.2 K/UL — SIGNIFICANT CHANGE UP (ref 0–0.5)
EOSINOPHIL NFR BLD AUTO: 2.7 % — SIGNIFICANT CHANGE UP (ref 0–6)
GAS PNL BLDV: 137 MMOL/L — SIGNIFICANT CHANGE UP (ref 136–146)
GLUCOSE BLDV-MCNC: 326 MG/DL — HIGH (ref 70–99)
GLUCOSE SERPL-MCNC: 339 MG/DL — HIGH (ref 70–99)
HCO3 BLDV-SCNC: 24 MMOL/L — SIGNIFICANT CHANGE UP (ref 20–27)
HCT VFR BLD CALC: 34.6 % — SIGNIFICANT CHANGE UP (ref 34.5–45)
HCT VFR BLDV CALC: 36.9 % — SIGNIFICANT CHANGE UP (ref 34.5–45)
HGB BLD-MCNC: 10.8 G/DL — LOW (ref 11.5–15.5)
HGB BLDV-MCNC: 12 G/DL — SIGNIFICANT CHANGE UP (ref 11.5–15.5)
IMM GRANULOCYTES NFR BLD AUTO: 0.3 % — SIGNIFICANT CHANGE UP (ref 0–1.5)
INR BLD: 1.1 — SIGNIFICANT CHANGE UP (ref 0.88–1.16)
LACTATE BLDV-MCNC: 3.2 MMOL/L — HIGH (ref 0.5–2)
LIDOCAIN IGE QN: 98.7 U/L — HIGH (ref 7–60)
LYMPHOCYTES # BLD AUTO: 2.16 K/UL — SIGNIFICANT CHANGE UP (ref 1–3.3)
LYMPHOCYTES # BLD AUTO: 29.2 % — SIGNIFICANT CHANGE UP (ref 13–44)
MCHC RBC-ENTMCNC: 24.1 PG — LOW (ref 27–34)
MCHC RBC-ENTMCNC: 31.2 % — LOW (ref 32–36)
MCV RBC AUTO: 77.1 FL — LOW (ref 80–100)
MONOCYTES # BLD AUTO: 0.49 K/UL — SIGNIFICANT CHANGE UP (ref 0–0.9)
MONOCYTES NFR BLD AUTO: 6.6 % — SIGNIFICANT CHANGE UP (ref 2–14)
NEUTROPHILS # BLD AUTO: 4.48 K/UL — SIGNIFICANT CHANGE UP (ref 1.8–7.4)
NEUTROPHILS NFR BLD AUTO: 60.7 % — SIGNIFICANT CHANGE UP (ref 43–77)
NRBC # FLD: 0 K/UL — SIGNIFICANT CHANGE UP (ref 0–0)
PCO2 BLDV: 50 MMHG — SIGNIFICANT CHANGE UP (ref 41–51)
PH BLDV: 7.34 PH — SIGNIFICANT CHANGE UP (ref 7.32–7.43)
PLATELET # BLD AUTO: 247 K/UL — SIGNIFICANT CHANGE UP (ref 150–400)
PMV BLD: 12.2 FL — SIGNIFICANT CHANGE UP (ref 7–13)
PO2 BLDV: 44 MMHG — HIGH (ref 35–40)
POTASSIUM BLDV-SCNC: 3.6 MMOL/L — SIGNIFICANT CHANGE UP (ref 3.4–4.5)
POTASSIUM SERPL-MCNC: 3.9 MMOL/L — SIGNIFICANT CHANGE UP (ref 3.5–5.3)
POTASSIUM SERPL-SCNC: 3.9 MMOL/L — SIGNIFICANT CHANGE UP (ref 3.5–5.3)
PROT SERPL-MCNC: 6.8 G/DL — SIGNIFICANT CHANGE UP (ref 6–8.3)
PROTHROM AB SERPL-ACNC: 12.5 SEC — SIGNIFICANT CHANGE UP (ref 10.6–13.6)
RBC # BLD: 4.49 M/UL — SIGNIFICANT CHANGE UP (ref 3.8–5.2)
RBC # FLD: 14.8 % — HIGH (ref 10.3–14.5)
SAO2 % BLDV: 73.5 % — SIGNIFICANT CHANGE UP (ref 60–85)
SODIUM SERPL-SCNC: 138 MMOL/L — SIGNIFICANT CHANGE UP (ref 135–145)
WBC # BLD: 7.39 K/UL — SIGNIFICANT CHANGE UP (ref 3.8–10.5)
WBC # FLD AUTO: 7.39 K/UL — SIGNIFICANT CHANGE UP (ref 3.8–10.5)

## 2020-11-09 PROCEDURE — 99284 EMERGENCY DEPT VISIT MOD MDM: CPT

## 2020-11-09 NOTE — ED PROVIDER NOTE - PHYSICAL EXAMINATION
Vital signs reviewed.   CONSTITUTIONAL: Well-appearing; well-nourished; in no apparent distress. Non-toxic appearing.   HEAD: Normocephalic, atraumatic.  EYES: PERRL, EOM intact, conjunctiva and sclera WNL.  ENT: normal nose; no rhinorrhea; normal pharynx with no tonsillar hypertrophy, no erythema, no exudate, no lymphadenopathy.  NECK/LYMPH: Supple; non-tender; no cervical lymphadenopathy.  CARD: Normal S1, S2; RRR  RESP: Normal chest excursion with respiration; breath sounds clear and equal bilaterally  ABD/GI: soft, non-distended; non-tender  EXT/MS: moves all extremities; distal pulses are normal, no pedal edema.  SKIN: Normal for age and race; warm; dry; good turgor; no apparent lesions or exudate noted. Palpable mobile mass, ~1x2cm, non tender, to the RLQ, no overlying erythema or warmth to touch  NEURO: Awake, alert, oriented x 3, no gross deficits, CN II-XII grossly intact, no motor or sensory deficit noted.  PSYCH: Normal mood; appropriate affect.

## 2020-11-09 NOTE — ED PROVIDER NOTE - ATTENDING CONTRIBUTION TO CARE
Dr. Henyr:  I performed a face to face bedside interview with patient regarding history of present illness, review of symptoms and past medical history. I completed an independent physical exam.  I have discussed patient's plan of care with PA.   I agree with note as stated above, having amended the EMR as needed to reflect my findings.   This includes HISTORY OF PRESENT ILLNESS, HIV, PAST MEDICAL/SURGICAL/FAMILY/SOCIAL HISTORY, ALLERGIES AND HOME MEDICATIONS, REVIEW OF SYSTEMS, PHYSICAL EXAM, and any PROGRESS NOTES during the time I functioned as the attending physician for this patient.    70F h/o HTN, HLD, DM, c/o "lump" that she noticed to her right lower abdomen.  Of note, had colonoscopy 2 wks go, denies abdominal pain.  Denies fever/chills ,cp ,sob, n/v/d, urinary symptoms.    Exam:   -nad  - rrr   -ctab   -abd soft ntnd; +mobile soft mass to right abdominal wall    A/P  - "lump" consistent with lipoma  - check basic labs, if wnl, f/u with her outpatient doctors

## 2020-11-09 NOTE — ED ADULT TRIAGE NOTE - PAIN RATING/NUMBER SCALE (0-10): ACTIVITY
Refilled metoprolol per protocol per fax request.    Last filled 2/11/2019  Last seen 2/11/2019  Upcoming appt.  Filled per protocol on call Dr Leny uDran 0

## 2020-11-09 NOTE — ED ADULT NURSE NOTE - OBJECTIVE STATEMENT
pt received to room 16, a&ox 4 Belarusian speaking  used ID#[id number], ambulatory, pmh of HLD, HTN, DM, p/w lump on RLQ. Pt endorses that she was recently d/c after colonscopy and did not receive her results yet and the lump is new. Lump evaluated with JESICA Euceda at the bedside, it appears superficial and bening. Soft, nontender. Pt breathing even and unlabored on room air. Denies fever, chills, cough, SOB, chest pain, palpitations, dizziness, N/V/D, numbness, tingling. Left 20g IV placed. Labs collected and sent. pending lab results and attending eval.

## 2020-11-09 NOTE — ED PROVIDER NOTE - PATIENT PORTAL LINK FT
You can access the FollowMyHealth Patient Portal offered by [facility] by registering at the following website: [email or web address]. By joining [employer]’s FollowMyHealth portal, you will also be able to view your health information using other applications (apps) compatible with our system.

## 2020-11-09 NOTE — ED PROVIDER NOTE - OBJECTIVE STATEMENT
69 y/o Czech speaking female, language line  ID [id number] used for interpretation, past medical history of HTN, HLD, and DM, here w/ a "lump" to the RLQ that she noticed after her colonoscopy 2 weeks ago. States she felt the mass after she was discharged from the hospital. She also did not hear back regarding her colonoscopy test results, so came into the ED today. Contrary to triage note, does NOT have discomfort to her RLQ, but would like to figure out what the lump is. Patient denies fevers, N/V, abdominal pain. Has been having normal bowel movements with the occasional constipation. Appetite decreased, but able to eat.

## 2020-11-09 NOTE — ED PROVIDER NOTE - PROGRESS NOTE DETAILS
JESICA Mccurdy: Lactate 3.2, no concern for ischemia or infection. Pt appears well hydrated. Discharge lounge will reach out to [facility] clinic to help get outpatient follow up, sons phone number provided. Spoke w/ patients malcolm Briscoe, recommended outpatient surgery follow up for removal of possible lipoma.

## 2020-11-09 NOTE — ED ADULT TRIAGE NOTE - CHIEF COMPLAINT QUOTE
Pt here for follow up after having colonoscopy. Pt was instructed to follow up out-patient but was unable to make an appointment. Pt states she had discomfort to the RLQ of her abdomen. Pt denies any present n/v/d, no constipation, afebrile.

## 2020-11-09 NOTE — ED PROVIDER NOTE - NSFOLLOWUPINSTRUCTIONS_ED_ALL_ED_FT
Please follow up with your physician regarding your colonoscopy results.    Recommend follow up with your primary care physician regarding the "lump" on your abdominal.  Examination consistent with lipoma.    If you develop any other concerning symptoms, please return to the emergency department.

## 2020-11-09 NOTE — ED ADULT NURSE NOTE - NSIMPLEMENTINTERV_GEN_ALL_ED
Implemented All Universal Safety Interventions:  [place] to call system. Call bell, personal items and telephone within reach. Instruct patient to call for assistance. Room bathroom lighting operational. Non-slip footwear when patient is off stretcher. Physically safe environment: no spills, clutter or unnecessary equipment. Stretcher in lowest position, wheels locked, appropriate side rails in place.

## 2020-11-09 NOTE — ED PROVIDER NOTE - CLINICAL SUMMARY MEDICAL DECISION MAKING FREE TEXT BOX
1 y/o Telugu speaking female, language line  ID [id number] used for interpretation, past medical history of HTN, HLD, and DM, here w/ a "lump" to the RLQ that she noticed after her colonoscopy 2 weeks ago. Exam consistent w/ lipoma. Will get labs to ensure no electrolyte abnl. Discharge lounge to facility follow up with GI (since she was unable to make an appt), surgery for lipoma removal.

## 2020-11-09 NOTE — ED ADULT NURSE NOTE - PRIMARY CARE PROVIDER
SUBJECTIVE:   Scooter Patterson is a 2 year old male presenting with a chief complaint of   Chief Complaint   Patient presents with     Fever     Started this evening. 103.7 an hour ago.  Brother also sick at home with fevers.      Ear Problem     saying ears hurt       He is an established patient of [facility].    HOWARD Lozada    Onset of symptoms was 1 day(s) ago.  Course of illness is same.    Severity moderate  Current and Associated symptoms: fever and ear pain bilateral  Denies cough - non-productive, wheezing and shortness of breath  Treatment measures tried include Tylenol/Ibuprofen  Predisposing factors include None  History of PE tubes? No  Recent antibiotics? No      Review of Systems   Constitutional: Positive for fever. Negative for chills and irritability.   HENT: Positive for ear pain. Negative for congestion, rhinorrhea, sore throat and trouble swallowing.    Eyes: Negative for pain, discharge and redness.   Respiratory: Negative for cough and wheezing.    Cardiovascular: Negative for chest pain.   Gastrointestinal: Negative for constipation, diarrhea, nausea and vomiting.   Genitourinary: Negative for dysuria.   Musculoskeletal: Negative for myalgias.   Skin: Negative for rash and wound.       History reviewed. No pertinent past medical history.  Family History   Problem Relation Age of Onset     Heart Murmur Brother      coarctation of aortic valce     Current Outpatient Prescriptions   Medication Sig Dispense Refill     cefdinir (OMNICEF) 250 MG/5ML suspension Take 4.4 mLs (220 mg) by mouth daily for 10 days 44 mL 0     Pediatric Multivit-Minerals-C (MULTIVITAMIN GUMMIES CHILDRENS) CHEW Take 1 chew tab by mouth daily       Social History   Substance Use Topics     Smoking status: Never Smoker     Smokeless tobacco: Not on file     Alcohol use Not on file       OBJECTIVE  Temp 102  F (38.9  C) (Tympanic)  Wt 34 lb (15.4 kg)    Physical Exam   Constitutional: He appears well-developed and  well-nourished. He is active. No distress.   HENT:   Head: Normocephalic and atraumatic.   Right Ear: Canal normal. Tympanic membrane is injected and bulging.   Left Ear: Tympanic membrane and canal normal.   Mouth/Throat: Oropharynx is clear.   Eyes: Conjunctivae are normal. Pupils are equal, round, and reactive to light.   Cardiovascular: Regular rhythm, S1 normal and S2 normal.    Pulmonary/Chest: Effort normal and breath sounds normal.   Neurological: He is alert.   Skin: Skin is warm and dry. No rash noted.       Labs:  No results found for this or any previous visit (from the past 24 hour(s)).    X-Ray was not done.    ASSESSMENT:      ICD-10-CM    1. Acute suppurative otitis media of right ear without spontaneous rupture of tympanic membrane, recurrence not specified H66.001 cefdinir (OMNICEF) 250 MG/5ML suspension        Medical Decision Making:    Differential Diagnosis:  URI Adult/Peds:  Acute right otitis media, Acute left otitis media, Viral syndrome and Viral upper respiratory illness    Serious Comorbid Conditions:  Peds:  None    PLAN:    URI Peds:  Tylenol, Ibuprofen, Fluids, Rest and Rx otitis media  Cefdinir 14 mg/kg/day    Followup:    If not improving or if condition worsens, follow up with your Primary Care Provider    There are no Patient Instructions on file for this visit.       unk

## 2020-11-11 NOTE — PATIENT PROFILE ADULT - NSPROIMPLANTSMEDDEV_GEN_A_NUR
Detail Level: Zone Initiate Treatment: Flucinonide gel 3x-4x daily to mouth sores until resolved. Do not eat or drink immediately after applying gel. Plan: Treated with LN2 None Plan: Wait for LN2 frozen spots on face to heal before using compounded cream Otc Regimen: Aquaphor moisturizer after using compounded cream Initiate Treatment: 5 FU / Calcipotriene compounded cream BID for 4 days on affected areas of face

## 2020-12-10 ENCOUNTER — OUTPATIENT (OUTPATIENT)
Dept: OUTPATIENT SERVICES | Facility: HOSPITAL | Age: 70
LOS: 1 days | End: 2020-12-10

## 2020-12-10 ENCOUNTER — APPOINTMENT (OUTPATIENT)
Dept: GASTROENTEROLOGY | Facility: CLINIC | Age: 70
End: 2020-12-10

## 2021-01-12 ENCOUNTER — APPOINTMENT (OUTPATIENT)
Dept: ENDOCRINOLOGY | Facility: CLINIC | Age: 71
End: 2021-01-12

## 2021-10-15 ENCOUNTER — INPATIENT (INPATIENT)
Facility: HOSPITAL | Age: 71
LOS: 3 days | Discharge: HOME HEALTH | End: 2021-10-19
Attending: INTERNAL MEDICINE | Admitting: INTERNAL MEDICINE
Payer: MEDICAID

## 2021-10-15 VITALS
SYSTOLIC BLOOD PRESSURE: 133 MMHG | DIASTOLIC BLOOD PRESSURE: 65 MMHG | HEART RATE: 74 BPM | RESPIRATION RATE: 16 BRPM | HEIGHT: 60 IN | TEMPERATURE: 98 F | OXYGEN SATURATION: 99 %

## 2021-10-15 DIAGNOSIS — I10 ESSENTIAL (PRIMARY) HYPERTENSION: ICD-10-CM

## 2021-10-15 DIAGNOSIS — R74.01 ELEVATION OF LEVELS OF LIVER TRANSAMINASE LEVELS: ICD-10-CM

## 2021-10-15 DIAGNOSIS — Z29.9 ENCOUNTER FOR PROPHYLACTIC MEASURES, UNSPECIFIED: ICD-10-CM

## 2021-10-15 DIAGNOSIS — R06.00 DYSPNEA, UNSPECIFIED: ICD-10-CM

## 2021-10-15 DIAGNOSIS — E11.9 TYPE 2 DIABETES MELLITUS WITHOUT COMPLICATIONS: ICD-10-CM

## 2021-10-15 LAB
ALBUMIN SERPL ELPH-MCNC: 5 G/DL — SIGNIFICANT CHANGE UP (ref 3.3–5)
ALP SERPL-CCNC: 86 U/L — SIGNIFICANT CHANGE UP (ref 40–120)
ALT FLD-CCNC: 51 U/L — HIGH (ref 4–33)
ANION GAP SERPL CALC-SCNC: 15 MMOL/L — HIGH (ref 7–14)
APPEARANCE UR: CLEAR — SIGNIFICANT CHANGE UP
AST SERPL-CCNC: 58 U/L — HIGH (ref 4–32)
BASE EXCESS BLDV CALC-SCNC: 3.9 MMOL/L — HIGH (ref -2–3)
BASOPHILS # BLD AUTO: 0.08 K/UL — SIGNIFICANT CHANGE UP (ref 0–0.2)
BASOPHILS NFR BLD AUTO: 0.7 % — SIGNIFICANT CHANGE UP (ref 0–2)
BILIRUB SERPL-MCNC: 0.8 MG/DL — SIGNIFICANT CHANGE UP (ref 0.2–1.2)
BILIRUB UR-MCNC: NEGATIVE — SIGNIFICANT CHANGE UP
BLOOD GAS VENOUS COMPREHENSIVE RESULT: SIGNIFICANT CHANGE UP
BUN SERPL-MCNC: 12 MG/DL — SIGNIFICANT CHANGE UP (ref 7–23)
CALCIUM SERPL-MCNC: 10.2 MG/DL — SIGNIFICANT CHANGE UP (ref 8.4–10.5)
CHLORIDE BLDV-SCNC: 99 MMOL/L — SIGNIFICANT CHANGE UP (ref 96–108)
CHLORIDE SERPL-SCNC: 97 MMOL/L — LOW (ref 98–107)
CO2 BLDV-SCNC: 32.6 MMOL/L — HIGH (ref 22–26)
CO2 SERPL-SCNC: 25 MMOL/L — SIGNIFICANT CHANGE UP (ref 22–31)
COLOR SPEC: SIGNIFICANT CHANGE UP
CREAT SERPL-MCNC: 0.51 MG/DL — SIGNIFICANT CHANGE UP (ref 0.5–1.3)
DIFF PNL FLD: NEGATIVE — SIGNIFICANT CHANGE UP
EOSINOPHIL # BLD AUTO: 0.57 K/UL — HIGH (ref 0–0.5)
EOSINOPHIL NFR BLD AUTO: 5 % — SIGNIFICANT CHANGE UP (ref 0–6)
EPI CELLS # UR: 4 /HPF — SIGNIFICANT CHANGE UP (ref 0–5)
GAS PNL BLDV: 135 MMOL/L — LOW (ref 136–145)
GLUCOSE BLDV-MCNC: 190 MG/DL — HIGH (ref 70–99)
GLUCOSE SERPL-MCNC: 196 MG/DL — HIGH (ref 70–99)
GLUCOSE UR QL: ABNORMAL
HCO3 BLDV-SCNC: 31 MMOL/L — HIGH (ref 22–29)
HCT VFR BLD CALC: 38.4 % — SIGNIFICANT CHANGE UP (ref 34.5–45)
HCT VFR BLDA CALC: 39 % — SIGNIFICANT CHANGE UP (ref 34.5–46.5)
HGB BLD CALC-MCNC: 12.9 G/DL — SIGNIFICANT CHANGE UP (ref 11.5–15.5)
HGB BLD-MCNC: 12.5 G/DL — SIGNIFICANT CHANGE UP (ref 11.5–15.5)
IANC: 7.33 K/UL — SIGNIFICANT CHANGE UP (ref 1.5–8.5)
IMM GRANULOCYTES NFR BLD AUTO: 0.3 % — SIGNIFICANT CHANGE UP (ref 0–1.5)
KETONES UR-MCNC: NEGATIVE — SIGNIFICANT CHANGE UP
LACTATE BLDV-MCNC: 2.2 MMOL/L — HIGH (ref 0.5–2)
LEUKOCYTE ESTERASE UR-ACNC: NEGATIVE — SIGNIFICANT CHANGE UP
LYMPHOCYTES # BLD AUTO: 2.8 K/UL — SIGNIFICANT CHANGE UP (ref 1–3.3)
LYMPHOCYTES # BLD AUTO: 24.4 % — SIGNIFICANT CHANGE UP (ref 13–44)
MAGNESIUM SERPL-MCNC: 1.5 MG/DL — LOW (ref 1.6–2.6)
MCHC RBC-ENTMCNC: 24.3 PG — LOW (ref 27–34)
MCHC RBC-ENTMCNC: 32.6 GM/DL — SIGNIFICANT CHANGE UP (ref 32–36)
MCV RBC AUTO: 74.7 FL — LOW (ref 80–100)
MONOCYTES # BLD AUTO: 0.67 K/UL — SIGNIFICANT CHANGE UP (ref 0–0.9)
MONOCYTES NFR BLD AUTO: 5.8 % — SIGNIFICANT CHANGE UP (ref 2–14)
NEUTROPHILS # BLD AUTO: 7.33 K/UL — SIGNIFICANT CHANGE UP (ref 1.8–7.4)
NEUTROPHILS NFR BLD AUTO: 63.8 % — SIGNIFICANT CHANGE UP (ref 43–77)
NITRITE UR-MCNC: NEGATIVE — SIGNIFICANT CHANGE UP
NRBC # BLD: 0 /100 WBCS — SIGNIFICANT CHANGE UP
NRBC # FLD: 0 K/UL — SIGNIFICANT CHANGE UP
NT-PROBNP SERPL-SCNC: 37 PG/ML — SIGNIFICANT CHANGE UP
PCO2 BLDV: 56 MMHG — HIGH (ref 39–42)
PH BLDV: 7.35 — SIGNIFICANT CHANGE UP (ref 7.32–7.43)
PH UR: 6.5 — SIGNIFICANT CHANGE UP (ref 5–8)
PLATELET # BLD AUTO: 334 K/UL — SIGNIFICANT CHANGE UP (ref 150–400)
PO2 BLDV: <20 MMHG — SIGNIFICANT CHANGE UP
POTASSIUM BLDV-SCNC: 3.7 MMOL/L — SIGNIFICANT CHANGE UP (ref 3.5–5.1)
POTASSIUM SERPL-MCNC: 3.9 MMOL/L — SIGNIFICANT CHANGE UP (ref 3.5–5.3)
POTASSIUM SERPL-SCNC: 3.9 MMOL/L — SIGNIFICANT CHANGE UP (ref 3.5–5.3)
PROT SERPL-MCNC: 8.5 G/DL — HIGH (ref 6–8.3)
PROT UR-MCNC: ABNORMAL
RBC # BLD: 5.14 M/UL — SIGNIFICANT CHANGE UP (ref 3.8–5.2)
RBC # FLD: 15.8 % — HIGH (ref 10.3–14.5)
RBC CASTS # UR COMP ASSIST: 1 /HPF — SIGNIFICANT CHANGE UP (ref 0–4)
SAO2 % BLDV: 22 % — SIGNIFICANT CHANGE UP
SARS-COV-2 RNA SPEC QL NAA+PROBE: SIGNIFICANT CHANGE UP
SODIUM SERPL-SCNC: 137 MMOL/L — SIGNIFICANT CHANGE UP (ref 135–145)
SP GR SPEC: 1.01 — SIGNIFICANT CHANGE UP (ref 1–1.05)
TROPONIN T, HIGH SENSITIVITY RESULT: <6 NG/L — SIGNIFICANT CHANGE UP
UROBILINOGEN FLD QL: SIGNIFICANT CHANGE UP
WBC # BLD: 11.49 K/UL — HIGH (ref 3.8–10.5)
WBC # FLD AUTO: 11.49 K/UL — HIGH (ref 3.8–10.5)
WBC UR QL: 2 /HPF — SIGNIFICANT CHANGE UP (ref 0–5)

## 2021-10-15 PROCEDURE — 99223 1ST HOSP IP/OBS HIGH 75: CPT

## 2021-10-15 PROCEDURE — 99285 EMERGENCY DEPT VISIT HI MDM: CPT | Mod: 25

## 2021-10-15 PROCEDURE — 93010 ELECTROCARDIOGRAM REPORT: CPT | Mod: 76

## 2021-10-15 PROCEDURE — 71046 X-RAY EXAM CHEST 2 VIEWS: CPT | Mod: 26

## 2021-10-15 RX ORDER — AMLODIPINE BESYLATE 2.5 MG/1
10 TABLET ORAL DAILY
Refills: 0 | Status: DISCONTINUED | OUTPATIENT
Start: 2021-10-15 | End: 2021-10-19

## 2021-10-15 RX ORDER — INSULIN LISPRO 100/ML
VIAL (ML) SUBCUTANEOUS
Refills: 0 | Status: DISCONTINUED | OUTPATIENT
Start: 2021-10-15 | End: 2021-10-15

## 2021-10-15 RX ORDER — PANTOPRAZOLE SODIUM 20 MG/1
40 TABLET, DELAYED RELEASE ORAL
Refills: 0 | Status: DISCONTINUED | OUTPATIENT
Start: 2021-10-15 | End: 2021-10-19

## 2021-10-15 RX ORDER — METOPROLOL TARTRATE 50 MG
1 TABLET ORAL
Qty: 0 | Refills: 0 | DISCHARGE

## 2021-10-15 RX ORDER — ENOXAPARIN SODIUM 100 MG/ML
40 INJECTION SUBCUTANEOUS EVERY 24 HOURS
Refills: 0 | Status: DISCONTINUED | OUTPATIENT
Start: 2021-10-15 | End: 2021-10-19

## 2021-10-15 RX ORDER — DEXTROSE 50 % IN WATER 50 %
15 SYRINGE (ML) INTRAVENOUS ONCE
Refills: 0 | Status: DISCONTINUED | OUTPATIENT
Start: 2021-10-15 | End: 2021-10-19

## 2021-10-15 RX ORDER — INSULIN LISPRO 100/ML
VIAL (ML) SUBCUTANEOUS
Refills: 0 | Status: DISCONTINUED | OUTPATIENT
Start: 2021-10-15 | End: 2021-10-19

## 2021-10-15 RX ORDER — ALBUTEROL 90 UG/1
2 AEROSOL, METERED ORAL EVERY 6 HOURS
Refills: 0 | Status: DISCONTINUED | OUTPATIENT
Start: 2021-10-15 | End: 2021-10-19

## 2021-10-15 RX ORDER — ASPIRIN/CALCIUM CARB/MAGNESIUM 324 MG
81 TABLET ORAL DAILY
Refills: 0 | Status: DISCONTINUED | OUTPATIENT
Start: 2021-10-15 | End: 2021-10-19

## 2021-10-15 RX ORDER — DEXTROSE 50 % IN WATER 50 %
12.5 SYRINGE (ML) INTRAVENOUS ONCE
Refills: 0 | Status: DISCONTINUED | OUTPATIENT
Start: 2021-10-15 | End: 2021-10-19

## 2021-10-15 RX ORDER — DEXTROSE 50 % IN WATER 50 %
25 SYRINGE (ML) INTRAVENOUS ONCE
Refills: 0 | Status: DISCONTINUED | OUTPATIENT
Start: 2021-10-15 | End: 2021-10-19

## 2021-10-15 RX ORDER — GLUCAGON INJECTION, SOLUTION 0.5 MG/.1ML
1 INJECTION, SOLUTION SUBCUTANEOUS ONCE
Refills: 0 | Status: DISCONTINUED | OUTPATIENT
Start: 2021-10-15 | End: 2021-10-19

## 2021-10-15 RX ORDER — SODIUM CHLORIDE 9 MG/ML
1000 INJECTION, SOLUTION INTRAVENOUS
Refills: 0 | Status: DISCONTINUED | OUTPATIENT
Start: 2021-10-15 | End: 2021-10-19

## 2021-10-15 RX ORDER — ATORVASTATIN CALCIUM 80 MG/1
20 TABLET, FILM COATED ORAL AT BEDTIME
Refills: 0 | Status: DISCONTINUED | OUTPATIENT
Start: 2021-10-15 | End: 2021-10-19

## 2021-10-15 RX ORDER — INSULIN GLARGINE 100 [IU]/ML
24 INJECTION, SOLUTION SUBCUTANEOUS AT BEDTIME
Refills: 0 | Status: DISCONTINUED | OUTPATIENT
Start: 2021-10-15 | End: 2021-10-19

## 2021-10-15 RX ORDER — ASPIRIN/CALCIUM CARB/MAGNESIUM 324 MG
325 TABLET ORAL ONCE
Refills: 0 | Status: COMPLETED | OUTPATIENT
Start: 2021-10-15 | End: 2021-10-15

## 2021-10-15 RX ORDER — METOPROLOL TARTRATE 50 MG
50 TABLET ORAL
Refills: 0 | Status: DISCONTINUED | OUTPATIENT
Start: 2021-10-15 | End: 2021-10-19

## 2021-10-15 RX ADMIN — Medication 4: at 22:52

## 2021-10-15 RX ADMIN — ATORVASTATIN CALCIUM 20 MILLIGRAM(S): 80 TABLET, FILM COATED ORAL at 22:18

## 2021-10-15 RX ADMIN — Medication 50 MILLIGRAM(S): at 19:45

## 2021-10-15 RX ADMIN — INSULIN GLARGINE 24 UNIT(S): 100 INJECTION, SOLUTION SUBCUTANEOUS at 22:50

## 2021-10-15 NOTE — H&P ADULT - PROBLEM SELECTOR PLAN 1
Pt reporting dyspnea on exertion for 15 days and acutely worsening in the past 3-4 days. VSS - sating well on RA. Exam significant for expiratory wheezing in all lung fields - good air movement. CXR clear. Unclear etiology at this time. New diagnosis asthma vs COPD (however non smoker - no risk factors) vs viral infection. Less likely cardiac etiology - denies chest pain, no LE edema or crackles heard on exam. Cards consulted.   - Cards recommending echocardiogram and stress test - ordered   - Plan to give albuterol q6 hours for wheezing   - Pulm outpatient follow up.

## 2021-10-15 NOTE — ED PROVIDER NOTE - ATTENDING CONTRIBUTION TO CARE
I have seen and examined the patient on the patient´s visit date. I have reviewed the note written by Donald Quintero MD  on that visit day. I have supervised and participated as necessary in the performance of procedures indicated for patient management and was available at all phases of the patient´s visit when needed. We discussed the history, physical exam findings, management plan, and  medical decision making. I have made my additions, exceptions, and revisions within the chart and I agree with H and P as documented in its entirety. The data and my interpretation of any data collected from labs, interventions and imaging appear below as well as my independent medical decision making and considerations    The patient is a 71y Female who has a past medical and surgery history of HTN DM HLD PTED with 1 month of SANDERS as described    Vital Signs Stable  PE: as described; my additions and exceptions are noted in the chart   DATA:  EKG: NSR 74 ? age Septal infarct; c/w EKG's on 10/16-17; former had ?septal infarct latter did not  RESULTS: All significant/relevant labs and imaging results present during the time of evaluation have been entered into chart through pullset function and are discussed below    MDM:  IMPRESSION: CHF ILDz [place]  Considerations; will need EKG trops     PLAN  Will admit for further workup of ACS and ECHO

## 2021-10-15 NOTE — H&P ADULT - ASSESSMENT
70 y/o F with PMH HTN, DM who presents for chest pain/SANDERS x1 month. She reports worsening dyspnea on exertion for the past 15 days, admitted for further work up.

## 2021-10-15 NOTE — CONSULT NOTE ADULT - ASSESSMENT
Echo 10/18/20: normal LV function, no significant valve disease, EF 73%    71 Maltese speaking female past medical history of HTN, HLD, and DM p/w SANDERS.      #SANDERS      Echo 10/18/20: normal LV function, no significant valve disease, EF 73%    71 Icelandic speaking female past medical history of HTN, HLD, and DM p/w SANDERS.      #SANDERS   -cv stable, no cp, EKG with no acute ischemia or arrythmia  -BRNP negative, CXR clear.   -pt. resting comfortably with no acute distress   -no ADHF on exam   -r/o RVP , covid swab pending  -will plan for echo to eval lvef, valve function  -discussed stress testing with patient and family, they would like to first proceed with echo first and decide on stress test pending clinical course and echo results.   -c/w asa 81mg daily if no CI    #htn  -stable  -resume norvasc, bb     dvt ppx

## 2021-10-15 NOTE — ED PROVIDER NOTE - NS ED ROS FT
Constitutional: No fever, chills.  Eyes:  No visual changes  ENMT:  No neck pain  Cardiac:  No chest pain  Respiratory:  No cough, +SOB  GI:  No nausea, vomiting, diarrhea, abdominal pain.  :  No dysuria, hematuria  MS:  No back pain.  Neuro:  No headache or lightheadedness  Skin:  No skin rash  Except as documented in the HPI,  all other systems are negative.

## 2021-10-15 NOTE — ED PROVIDER NOTE - OBJECTIVE STATEMENT
71 Yoruba speaking female past medical history of HTN, HLD, and DM p/w sob x 1 month. sob worsens with movement and excretion. has been worsening over the past week. sxs resolve after resting.   pt denies any fever, chills, dizziness, cp, palpitations, cough, abdominal pain, n/v/d, dysuria, numbness/weakness, recent travel, hx of DVT/PE

## 2021-10-15 NOTE — ED ADULT NURSE NOTE - OBJECTIVE STATEMENT
Pt to bed 24. Alert and oriented x 3. Pt c/o exertional SOB. NO resp distress noted. IVL placed. Bloods drawn. Will continue to monitor.

## 2021-10-15 NOTE — H&P ADULT - PROBLEM SELECTOR PLAN 3
Hx of DM. On Lantus 24U + januvia and metformin.   - Hold oral meds   - C/w lantus 24U qhs   - A1C pending

## 2021-10-15 NOTE — H&P ADULT - PROBLEM SELECTOR PLAN 4
Mild transaminitis. Present on prior admission one year ago. Stable. Likely hepatic steatosis.   - Outpatient follow up.

## 2021-10-15 NOTE — CONSULT NOTE ADULT - SUBJECTIVE AND OBJECTIVE BOX
CARDIOLOGY CONSULT - Dr. Breen     CHIEF COMPLAINT: SOB     HPI: 71 Tamazight speaking female past medical history of HTN, HLD, and DM p/w sob x 1 month. SOB is progressive over the past few weeks and is worse with exertion, resolves with rest.   Patient denies any chest pain, palpitations, cough, syncope, edema, exertional symptoms, nausea, abdominal pain, fever, chills,  or rash.  Denies any history of CAD, MI, valve disease, cardiomyopathy, or congenital heart disease..      PAST MEDICAL & SURGICAL HISTORY:  HTN (hypertension)    DM (diabetes mellitus)    HLD (hyperlipidemia)    No significant past surgical history            PREVIOUS DIAGNOSTIC TESTING:    [ ] Echocardiogram: < from: Transthoracic Echocardiogram (10.18.20 @ 08:55) >  CONCLUSIONS:  1. Normal left ventricular internal dimensions and wall  thicknesses.  2. Normal left ventricular systolic function. No segmental  wall motion abnormalities.  3. Normal right ventricular size and function.  *** No previous Echo exam.    < end of copied text >    [ ]  Catheterization:   [ ] Stress Test:  	    MEDICATIONS:  HOME MEDICATIONS:  amLODIPine 10 mg oral tablet: 1 tab(s) orally once a day (17 Oct 2020 08:13)  aspirin 81 mg oral tablet: 1 tab(s) orally once a day (17 Oct 2020 08:13)  Metoprolol Tartrate 50 mg oral tablet: 1 tab(s) orally 3 times a day (17 Oct 2020 08:13)  Vitamin B12 1000 mcg oral tablet: 1 tab(s) orally once a day (17 Oct 2020 08:13)      MEDICATIONS  (STANDING):          FAMILY HISTORY:  Family history of hypertension  Father        SOCIAL HISTORY:    [x] Non-smoker  [ ] Smoker  [ ] Alcohol    Allergies    No Known Drug Allergies  Seafood (Hives; Rash)    Intolerances    	    REVIEW OF SYSTEMS:  CONSTITUTIONAL: No fever, weight loss, or fatigue  EYES: No eye pain, visual disturbances, or discharge  ENMT:  No difficulty hearing, tinnitus, vertigo; No sinus or throat pain  NECK: No pain or stiffness  RESPIRATORY: No cough, wheezing, chills or hemoptysis; + Shortness of Breath, +moser   CARDIOVASCULAR: No chest pain, palpitations, passing out, dizziness, or leg swelling  GASTROINTESTINAL: No abdominal or epigastric pain. No nausea, vomiting, or hematemesis; No diarrhea or constipation. No melena or hematochezia.  GENITOURINARY: No dysuria, frequency, hematuria, or incontinence  NEUROLOGICAL: No headaches, memory loss, loss of strength, numbness, or tremors  SKIN: No itching, burning, rashes, or lesions   	    [x ] All others negative	see hpi  [ ] Unable to obtain    PHYSICAL EXAM:  T(C): 36.8 (10-15-21 @ 12:06), Max: 36.8 (10-15-21 @ 12:06)  HR: 74 (10-15-21 @ 12:06) (74 - 74)  BP: 133/65 (10-15-21 @ 12:06) (133/65 - 133/65)  RR: 16 (10-15-21 @ 12:06) (16 - 16)  SpO2: 99% (10-15-21 @ 12:06) (99% - 99%)  Wt(kg): --  I&O's Summary      Appearance: Normal	  Psychiatry: A & O x 3, Mood & affect appropriate  HEENT:   Normal oral mucosa, PERRL, EOMI	  Lymphatic: No lymphadenopathy  Cardiovascular: Normal S1 S2,RRR, No JVD, No murmurs  Respiratory: Lungs clear to auscultation	  Gastrointestinal:  Soft, Non-tender, + BS	  Skin: No rashes, No ecchymoses, No cyanosis	  Neurologic: Non-focal  Extremities: Normal range of motion, No clubbing, cyanosis or edema  Vascular: Peripheral pulses palpable 2+ bilaterally    TELEMETRY: 	    ECG:  	  RADIOLOGY: < from: Xray Chest 2 Views PA/Lat (10.15.21 @ 15:16) >  There are degenerative changes of the thoracic spine.    IMPRESSION:  Clear lungs.    < end of copied text >    OTHER: 	  	  LABS:	 	    CARDIAC MARKERS:                                  12.5   11.49 )-----------( 334      ( 15 Oct 2021 14:43 )             38.4     10-15    137  |  97<L>  |  12  ----------------------------<  196<H>  3.9   |  25  |  0.51    Ca    10.2      15 Oct 2021 14:43  Mg     1.50     10-15    TPro  8.5<H>  /  Alb  5.0  /  TBili  0.8  /  DBili  x   /  AST  58<H>  /  ALT  51<H>  /  AlkPhos  86  10-15      proBNP: Serum Pro-Brain Natriuretic Peptide: 37 pg/mL (10-15 @ 14:43)    Lipid Profile:   HgA1c:   TSH:

## 2021-10-15 NOTE — H&P ADULT - NSHPPHYSICALEXAM_GEN_ALL_CORE
.  VITAL SIGNS:  T(C): 36.8 (10-15-21 @ 12:06), Max: 36.8 (10-15-21 @ 12:06)  T(F): 98.2 (10-15-21 @ 12:06), Max: 98.2 (10-15-21 @ 12:06)  HR: 74 (10-15-21 @ 12:06) (74 - 74)  BP: 133/65 (10-15-21 @ 12:06) (133/65 - 133/65)  BP(mean): --  RR: 16 (10-15-21 @ 12:06) (16 - 16)  SpO2: 99% (10-15-21 @ 12:06) (99% - 99%)  Wt(kg): --    PHYSICAL EXAM:    Constitutional: WDWN resting comfortably in bed; NAD  Head: NC/AT  Eyes: PERRL, EOMI, anicteric sclera  ENT: no nasal discharge; uvula midline, no oropharyngeal erythema or exudates; MMM  Neck: supple; no JVD or thyromegaly  Respiratory: CTA B/L; no W/R/R, no retractions  Cardiac: +S1/S2; RRR; no M/R/G; PMI non-displaced  Gastrointestinal: abdomen soft, NT/ND; no rebound or guarding; +BSx4  Back: spine midline, no bony tenderness or step-offs; no CVAT B/L  Extremities: WWP, no clubbing or cyanosis; no peripheral edema  Musculoskeletal: NROM x4; no joint swelling, tenderness or erythema  Vascular: 2+ radial, femoral, DP/PT pulses B/L  Dermatologic: skin warm, dry and intact; no rashes, wounds, or scars  Lymphatic: no submandibular or cervical LAD  Neurologic: AAOx3; CNII-XII grossly intact; no focal deficits  Psychiatric: affect and characteristics of appearance, verbalizations, behaviors are appropriate .  VITAL SIGNS:  T(C): 36.8 (10-15-21 @ 12:06), Max: 36.8 (10-15-21 @ 12:06)  T(F): 98.2 (10-15-21 @ 12:06), Max: 98.2 (10-15-21 @ 12:06)  HR: 74 (10-15-21 @ 12:06) (74 - 74)  BP: 133/65 (10-15-21 @ 12:06) (133/65 - 133/65)  BP(mean): --  RR: 16 (10-15-21 @ 12:06) (16 - 16)  SpO2: 99% (10-15-21 @ 12:06) (99% - 99%)  Wt(kg): --    PHYSICAL EXAM:    Constitutional: WDWN female resting comfortably in bed; NAD  Head: NC/AT  Eyes: PERRL, EOMI, anicteric sclera  ENT: no nasal discharge; uvula midline, no oropharyngeal erythema or exudates; MMM  Neck: supple; no JVD or thyromegaly  Respiratory: CTA B/L; no expiratory wheezes heard in all lung fields bilaterally. No crackles/rhonchi.  no retractions  Cardiac: +S1/S2; RRR; no M/R/G; PMI non-displaced  Gastrointestinal: abdomen soft, NT/ND; no rebound or guarding; +BSx4  Back: spine midline, no bony tenderness or step-offs; no CVAT B/L  Extremities: WWP, no clubbing or cyanosis; no peripheral edema  Musculoskeletal: NROM x4; no joint swelling, tenderness or erythema  Vascular: 2+ radial, femoral, DP/PT pulses B/L  Dermatologic: skin warm, dry and intact; no rashes, wounds, or scars  Lymphatic: no submandibular or cervical LAD  Neurologic: AAOx3; CNII-XII grossly intact; no focal deficits  Psychiatric: affect and characteristics of appearance, verbalizations, behaviors are appropriate

## 2021-10-15 NOTE — ED PROVIDER NOTE - CLINICAL SUMMARY MEDICAL DECISION MAKING FREE TEXT BOX
72 y/o p/w SANDERS x 1 month which has been worsening. vss, lungs cta, abd ntnd. no leg edema. ddx ILD, CHF, stable angina, URI. will eval with labs and imaging and admit for cardiac workup.

## 2021-10-15 NOTE — H&P ADULT - NSHPLABSRESULTS_GEN_ALL_CORE
.  LABS:                         12.5   11.49 )-----------( 334      ( 15 Oct 2021 14:43 )             38.4     10-15    137  |  97<L>  |  12  ----------------------------<  196<H>  3.9   |  25  |  0.51    Ca    10.2      15 Oct 2021 14:43  Mg     1.50     10-15    TPro  8.5<H>  /  Alb  5.0  /  TBili  0.8  /  DBili  x   /  AST  58<H>  /  ALT  51<H>  /  AlkPhos  86  10-15      Urinalysis Basic - ( 15 Oct 2021 14:43 )    Color: Light Yellow / Appearance: Clear / S.012 / pH: x  Gluc: x / Ketone: Negative  / Bili: Negative / Urobili: <2 mg/dL   Blood: x / Protein: Trace / Nitrite: Negative   Leuk Esterase: Negative / RBC: 1 /HPF / WBC 2 /HPF   Sq Epi: x / Non Sq Epi: 4 /HPF / Bacteria: x          Serum Pro-Brain Natriuretic Peptide: 37 pg/mL (10-15 @ 14:43)        RADIOLOGY, EKG & ADDITIONAL TESTS: Reviewed.

## 2021-10-15 NOTE — CONSULT NOTE ADULT - TIME BILLING
Patient seen and examined.  Agree with above NP note.  71 Uzbek speaking female past medical history of HTN, HLD, and DM p/w SANDERS.      #SANDERS   -no ACS, not overtly overloaded  -r/o RVP , covid swab pending  - plan for echo to eval lvef, valve function  -discussed stress testing with patient and family, they would like to first proceed with echo first and decide on stress test pending clinical course and echo results.   -c/w asa 81mg daily if no CI    #htn  -stable  -resume norvasc, bb     dvt ppx

## 2021-10-15 NOTE — H&P ADULT - HISTORY OF PRESENT ILLNESS
72 y/o F with PMH HTN, DM who presents for chest pain/SANDERS x1 month.  70 y/o F with PMH HTN, DM who presents for chest pain/SANDERS x1 month. She reports worsening dyspnea on exertion for the past 15 days. She says in the past 3-4 days it has been worsening. She describes difficulty breathing after walking briskly for just a few minutes which was never an issue in the past. When she is exerting herself she denies chest pain. She has been experiencing cough and she feels like she has been wheezing. She also says her tongue is very dry. She denies abdominal pain, nausea/vomiting. She has no leg swelling, but does report numbness/tingling in her lower extremities. She denies orthopnea and PND. All other ROS negative.

## 2021-10-15 NOTE — ED ADULT TRIAGE NOTE - CHIEF COMPLAINT QUOTE
Pt presents to ED for SOB x 1-2months, worsening last week. Pt states the SOB worsens when ambulating. Also c/o nausea and decreased PO intake. Denies chest pain, cough, fevers, chills. Pmhx DM, HLD, HTN.

## 2021-10-16 LAB
A1C WITH ESTIMATED AVERAGE GLUCOSE RESULT: 8.4 % — HIGH (ref 4–5.6)
ANION GAP SERPL CALC-SCNC: 15 MMOL/L — HIGH (ref 7–14)
BUN SERPL-MCNC: 9 MG/DL — SIGNIFICANT CHANGE UP (ref 7–23)
CALCIUM SERPL-MCNC: 9.4 MG/DL — SIGNIFICANT CHANGE UP (ref 8.4–10.5)
CHLORIDE SERPL-SCNC: 103 MMOL/L — SIGNIFICANT CHANGE UP (ref 98–107)
CO2 SERPL-SCNC: 25 MMOL/L — SIGNIFICANT CHANGE UP (ref 22–31)
COVID-19 SPIKE DOMAIN AB INTERP: POSITIVE
COVID-19 SPIKE DOMAIN ANTIBODY RESULT: >250 U/ML — HIGH
CREAT SERPL-MCNC: 0.43 MG/DL — LOW (ref 0.5–1.3)
CULTURE RESULTS: SIGNIFICANT CHANGE UP
ESTIMATED AVERAGE GLUCOSE: 194 — SIGNIFICANT CHANGE UP
GLUCOSE SERPL-MCNC: 128 MG/DL — HIGH (ref 70–99)
HCT VFR BLD CALC: 38.6 % — SIGNIFICANT CHANGE UP (ref 34.5–45)
HGB BLD-MCNC: 12.9 G/DL — SIGNIFICANT CHANGE UP (ref 11.5–15.5)
MAGNESIUM SERPL-MCNC: 1.6 MG/DL — SIGNIFICANT CHANGE UP (ref 1.6–2.6)
MCHC RBC-ENTMCNC: 24.8 PG — LOW (ref 27–34)
MCHC RBC-ENTMCNC: 33.4 GM/DL — SIGNIFICANT CHANGE UP (ref 32–36)
MCV RBC AUTO: 74.1 FL — LOW (ref 80–100)
NRBC # BLD: 0 /100 WBCS — SIGNIFICANT CHANGE UP
NRBC # FLD: 0 K/UL — SIGNIFICANT CHANGE UP
PHOSPHATE SERPL-MCNC: 3.3 MG/DL — SIGNIFICANT CHANGE UP (ref 2.5–4.5)
PLATELET # BLD AUTO: 299 K/UL — SIGNIFICANT CHANGE UP (ref 150–400)
POTASSIUM SERPL-MCNC: 3.5 MMOL/L — SIGNIFICANT CHANGE UP (ref 3.5–5.3)
POTASSIUM SERPL-SCNC: 3.5 MMOL/L — SIGNIFICANT CHANGE UP (ref 3.5–5.3)
RBC # BLD: 5.21 M/UL — HIGH (ref 3.8–5.2)
RBC # FLD: 15.9 % — HIGH (ref 10.3–14.5)
SARS-COV-2 IGG+IGM SERPL QL IA: >250 U/ML — HIGH
SARS-COV-2 IGG+IGM SERPL QL IA: POSITIVE
SODIUM SERPL-SCNC: 143 MMOL/L — SIGNIFICANT CHANGE UP (ref 135–145)
SPECIMEN SOURCE: SIGNIFICANT CHANGE UP
WBC # BLD: 7.85 K/UL — SIGNIFICANT CHANGE UP (ref 3.8–10.5)
WBC # FLD AUTO: 7.85 K/UL — SIGNIFICANT CHANGE UP (ref 3.8–10.5)

## 2021-10-16 RX ORDER — MAGNESIUM OXIDE 400 MG ORAL TABLET 241.3 MG
400 TABLET ORAL
Refills: 0 | Status: COMPLETED | OUTPATIENT
Start: 2021-10-16 | End: 2021-10-16

## 2021-10-16 RX ORDER — POTASSIUM CHLORIDE 20 MEQ
40 PACKET (EA) ORAL ONCE
Refills: 0 | Status: COMPLETED | OUTPATIENT
Start: 2021-10-16 | End: 2021-10-16

## 2021-10-16 RX ADMIN — Medication 81 MILLIGRAM(S): at 12:16

## 2021-10-16 RX ADMIN — ATORVASTATIN CALCIUM 20 MILLIGRAM(S): 80 TABLET, FILM COATED ORAL at 23:38

## 2021-10-16 RX ADMIN — Medication 50 MILLIGRAM(S): at 17:55

## 2021-10-16 RX ADMIN — MAGNESIUM OXIDE 400 MG ORAL TABLET 400 MILLIGRAM(S): 241.3 TABLET ORAL at 12:16

## 2021-10-16 RX ADMIN — INSULIN GLARGINE 24 UNIT(S): 100 INJECTION, SOLUTION SUBCUTANEOUS at 23:20

## 2021-10-16 RX ADMIN — AMLODIPINE BESYLATE 10 MILLIGRAM(S): 2.5 TABLET ORAL at 06:44

## 2021-10-16 RX ADMIN — Medication 2: at 12:17

## 2021-10-16 RX ADMIN — MAGNESIUM OXIDE 400 MG ORAL TABLET 400 MILLIGRAM(S): 241.3 TABLET ORAL at 17:55

## 2021-10-16 RX ADMIN — Medication 4: at 17:54

## 2021-10-16 RX ADMIN — PANTOPRAZOLE SODIUM 40 MILLIGRAM(S): 20 TABLET, DELAYED RELEASE ORAL at 06:44

## 2021-10-16 RX ADMIN — Medication 50 MILLIGRAM(S): at 06:44

## 2021-10-16 RX ADMIN — ENOXAPARIN SODIUM 40 MILLIGRAM(S): 100 INJECTION SUBCUTANEOUS at 06:45

## 2021-10-16 RX ADMIN — Medication 40 MILLIEQUIVALENT(S): at 12:15

## 2021-10-16 NOTE — PATIENT PROFILE ADULT - IS PATIENT POST-MENOPAUSAL?
Patient is calling because her oncology provider is changing networks to [facility]. Patient needs new referral for oncology. Please contact patient. yes

## 2021-10-17 LAB
ANION GAP SERPL CALC-SCNC: 13 MMOL/L — SIGNIFICANT CHANGE UP (ref 7–14)
BUN SERPL-MCNC: 10 MG/DL — SIGNIFICANT CHANGE UP (ref 7–23)
CALCIUM SERPL-MCNC: 9.5 MG/DL — SIGNIFICANT CHANGE UP (ref 8.4–10.5)
CHLORIDE SERPL-SCNC: 101 MMOL/L — SIGNIFICANT CHANGE UP (ref 98–107)
CO2 SERPL-SCNC: 24 MMOL/L — SIGNIFICANT CHANGE UP (ref 22–31)
CREAT SERPL-MCNC: 0.45 MG/DL — LOW (ref 0.5–1.3)
GLUCOSE SERPL-MCNC: 136 MG/DL — HIGH (ref 70–99)
HCT VFR BLD CALC: 38.4 % — SIGNIFICANT CHANGE UP (ref 34.5–45)
HGB BLD-MCNC: 12.5 G/DL — SIGNIFICANT CHANGE UP (ref 11.5–15.5)
MAGNESIUM SERPL-MCNC: 1.9 MG/DL — SIGNIFICANT CHANGE UP (ref 1.6–2.6)
MCHC RBC-ENTMCNC: 24 PG — LOW (ref 27–34)
MCHC RBC-ENTMCNC: 32.6 GM/DL — SIGNIFICANT CHANGE UP (ref 32–36)
MCV RBC AUTO: 73.8 FL — LOW (ref 80–100)
NRBC # BLD: 0 /100 WBCS — SIGNIFICANT CHANGE UP
NRBC # FLD: 0 K/UL — SIGNIFICANT CHANGE UP
PHOSPHATE SERPL-MCNC: 2.8 MG/DL — SIGNIFICANT CHANGE UP (ref 2.5–4.5)
PLATELET # BLD AUTO: 311 K/UL — SIGNIFICANT CHANGE UP (ref 150–400)
POTASSIUM SERPL-MCNC: 3.6 MMOL/L — SIGNIFICANT CHANGE UP (ref 3.5–5.3)
POTASSIUM SERPL-SCNC: 3.6 MMOL/L — SIGNIFICANT CHANGE UP (ref 3.5–5.3)
RBC # BLD: 5.2 M/UL — SIGNIFICANT CHANGE UP (ref 3.8–5.2)
RBC # FLD: 15.9 % — HIGH (ref 10.3–14.5)
SODIUM SERPL-SCNC: 138 MMOL/L — SIGNIFICANT CHANGE UP (ref 135–145)
WBC # BLD: 9.61 K/UL — SIGNIFICANT CHANGE UP (ref 3.8–10.5)
WBC # FLD AUTO: 9.61 K/UL — SIGNIFICANT CHANGE UP (ref 3.8–10.5)

## 2021-10-17 PROCEDURE — 93306 TTE W/DOPPLER COMPLETE: CPT | Mod: 26

## 2021-10-17 RX ADMIN — Medication 3: at 11:54

## 2021-10-17 RX ADMIN — INSULIN GLARGINE 24 UNIT(S): 100 INJECTION, SOLUTION SUBCUTANEOUS at 21:37

## 2021-10-17 RX ADMIN — ATORVASTATIN CALCIUM 20 MILLIGRAM(S): 80 TABLET, FILM COATED ORAL at 22:53

## 2021-10-17 RX ADMIN — Medication 3: at 21:36

## 2021-10-17 RX ADMIN — ENOXAPARIN SODIUM 40 MILLIGRAM(S): 100 INJECTION SUBCUTANEOUS at 07:29

## 2021-10-17 RX ADMIN — Medication 81 MILLIGRAM(S): at 11:55

## 2021-10-17 RX ADMIN — AMLODIPINE BESYLATE 10 MILLIGRAM(S): 2.5 TABLET ORAL at 07:29

## 2021-10-17 RX ADMIN — PANTOPRAZOLE SODIUM 40 MILLIGRAM(S): 20 TABLET, DELAYED RELEASE ORAL at 07:29

## 2021-10-17 RX ADMIN — Medication 5: at 17:09

## 2021-10-17 RX ADMIN — Medication 50 MILLIGRAM(S): at 17:11

## 2021-10-17 RX ADMIN — Medication 50 MILLIGRAM(S): at 07:29

## 2021-10-18 LAB
ANION GAP SERPL CALC-SCNC: 14 MMOL/L — SIGNIFICANT CHANGE UP (ref 7–14)
BUN SERPL-MCNC: 11 MG/DL — SIGNIFICANT CHANGE UP (ref 7–23)
CALCIUM SERPL-MCNC: 9.1 MG/DL — SIGNIFICANT CHANGE UP (ref 8.4–10.5)
CHLORIDE SERPL-SCNC: 104 MMOL/L — SIGNIFICANT CHANGE UP (ref 98–107)
CO2 SERPL-SCNC: 23 MMOL/L — SIGNIFICANT CHANGE UP (ref 22–31)
CREAT SERPL-MCNC: 0.47 MG/DL — LOW (ref 0.5–1.3)
GLUCOSE SERPL-MCNC: 104 MG/DL — HIGH (ref 70–99)
HCT VFR BLD CALC: 38.6 % — SIGNIFICANT CHANGE UP (ref 34.5–45)
HGB BLD-MCNC: 12.7 G/DL — SIGNIFICANT CHANGE UP (ref 11.5–15.5)
MAGNESIUM SERPL-MCNC: 1.8 MG/DL — SIGNIFICANT CHANGE UP (ref 1.6–2.6)
MCHC RBC-ENTMCNC: 24.5 PG — LOW (ref 27–34)
MCHC RBC-ENTMCNC: 32.9 GM/DL — SIGNIFICANT CHANGE UP (ref 32–36)
MCV RBC AUTO: 74.5 FL — LOW (ref 80–100)
NRBC # BLD: 0 /100 WBCS — SIGNIFICANT CHANGE UP
NRBC # FLD: 0 K/UL — SIGNIFICANT CHANGE UP
PHOSPHATE SERPL-MCNC: 3.3 MG/DL — SIGNIFICANT CHANGE UP (ref 2.5–4.5)
PLATELET # BLD AUTO: 340 K/UL — SIGNIFICANT CHANGE UP (ref 150–400)
POTASSIUM SERPL-MCNC: 3.5 MMOL/L — SIGNIFICANT CHANGE UP (ref 3.5–5.3)
POTASSIUM SERPL-SCNC: 3.5 MMOL/L — SIGNIFICANT CHANGE UP (ref 3.5–5.3)
RBC # BLD: 5.18 M/UL — SIGNIFICANT CHANGE UP (ref 3.8–5.2)
RBC # FLD: 16 % — HIGH (ref 10.3–14.5)
SODIUM SERPL-SCNC: 141 MMOL/L — SIGNIFICANT CHANGE UP (ref 135–145)
WBC # BLD: 9.18 K/UL — SIGNIFICANT CHANGE UP (ref 3.8–10.5)
WBC # FLD AUTO: 9.18 K/UL — SIGNIFICANT CHANGE UP (ref 3.8–10.5)

## 2021-10-18 RX ADMIN — INSULIN GLARGINE 24 UNIT(S): 100 INJECTION, SOLUTION SUBCUTANEOUS at 21:47

## 2021-10-18 RX ADMIN — PANTOPRAZOLE SODIUM 40 MILLIGRAM(S): 20 TABLET, DELAYED RELEASE ORAL at 05:10

## 2021-10-18 RX ADMIN — Medication 4: at 17:14

## 2021-10-18 RX ADMIN — ATORVASTATIN CALCIUM 20 MILLIGRAM(S): 80 TABLET, FILM COATED ORAL at 21:56

## 2021-10-18 RX ADMIN — AMLODIPINE BESYLATE 10 MILLIGRAM(S): 2.5 TABLET ORAL at 05:10

## 2021-10-18 RX ADMIN — Medication 50 MILLIGRAM(S): at 17:15

## 2021-10-18 RX ADMIN — Medication 4: at 12:40

## 2021-10-18 RX ADMIN — Medication 81 MILLIGRAM(S): at 12:40

## 2021-10-18 RX ADMIN — Medication 2: at 21:46

## 2021-10-18 RX ADMIN — Medication 50 MILLIGRAM(S): at 05:10

## 2021-10-18 RX ADMIN — ENOXAPARIN SODIUM 40 MILLIGRAM(S): 100 INJECTION SUBCUTANEOUS at 05:10

## 2021-10-18 NOTE — CONSULT NOTE ADULT - PROBLEM/RECOMMENDATION-1
[FreeTextEntry1] : The patient is a 69-year-old G3, P2 postmenopausal white female of Turkish descent.  She underwent menopause at age 55 and never took any hormone replacement therapy.  She underwent menarche at age 10 and had her first child at age 17.  She has a family history with her maternal aunts with breast cancer at age 70 and her father had brain and lung cancer at age 52.  The patient felt a mass in the right breast upper inner quadrant in October 2017 and underwent a mammography and ultrasound showing a suspicious spiculated mass measuring about 1.2 cm in the right breast 1:00 region 8 cm from the nipple.  She underwent an ultrasound-guided core biopsy on October 24, 2017 at [facility] which showed a 1 cm high-grade infiltrating ductal cancer which was ER positive at 90%, WY positive at 50%, HER-2/elissa negative by IHC with a Ki-67 of 20%.  She underwent a bilateral breast MRI on November 8, 2017 which showed a localized right breast cancer.  She underwent a right breast partial mastectomy and sentinel lymph node biopsy on December 11, 2017 and the final pathology showed 5 negative sentinel lymph nodes and the wide excision showed a 1.5 cm high-grade infiltrating ductal cancer with negative margins.  This was a pathologic prognostic stage IA breast cancer.  MammaPrint showed a high risk luminal type and she underwent AC-T chemotherapy with Dr. Stack which finished in May 2018.  She received external beam radiation which finished in July 2018 and was started on Arimidex in August 2018.  On exam today, she has no evidence of recurrence in the right breast and no suspicious findings felt in the left breast.  She underwent a bilateral mammography and ultrasound at [facility] on April 22, 2021 just showing postop changes to the right breast.  The patient was reassured and should follow-up again in 6 months in April 2022.  Her next bilateral mammography and ultrasound will be due at that time and she was given prescriptions. She will remain on Arimidex and follow-up with Dr. Walker.
DISPLAY PLAN FREE TEXT

## 2021-10-19 ENCOUNTER — TRANSCRIPTION ENCOUNTER (OUTPATIENT)
Age: 71
End: 2021-10-19

## 2021-10-19 VITALS
OXYGEN SATURATION: 100 % | DIASTOLIC BLOOD PRESSURE: 68 MMHG | RESPIRATION RATE: 17 BRPM | HEART RATE: 67 BPM | TEMPERATURE: 98 F | SYSTOLIC BLOOD PRESSURE: 141 MMHG

## 2021-10-19 LAB
ANION GAP SERPL CALC-SCNC: 15 MMOL/L — HIGH (ref 7–14)
BUN SERPL-MCNC: 12 MG/DL — SIGNIFICANT CHANGE UP (ref 7–23)
CALCIUM SERPL-MCNC: 9.6 MG/DL — SIGNIFICANT CHANGE UP (ref 8.4–10.5)
CHLORIDE SERPL-SCNC: 103 MMOL/L — SIGNIFICANT CHANGE UP (ref 98–107)
CO2 SERPL-SCNC: 23 MMOL/L — SIGNIFICANT CHANGE UP (ref 22–31)
CREAT SERPL-MCNC: 0.45 MG/DL — LOW (ref 0.5–1.3)
GLUCOSE SERPL-MCNC: 131 MG/DL — HIGH (ref 70–99)
HCT VFR BLD CALC: 39.7 % — SIGNIFICANT CHANGE UP (ref 34.5–45)
HGB BLD-MCNC: 12.8 G/DL — SIGNIFICANT CHANGE UP (ref 11.5–15.5)
MAGNESIUM SERPL-MCNC: 1.8 MG/DL — SIGNIFICANT CHANGE UP (ref 1.6–2.6)
MCHC RBC-ENTMCNC: 24.2 PG — LOW (ref 27–34)
MCHC RBC-ENTMCNC: 32.2 GM/DL — SIGNIFICANT CHANGE UP (ref 32–36)
MCV RBC AUTO: 75 FL — LOW (ref 80–100)
NRBC # BLD: 0 /100 WBCS — SIGNIFICANT CHANGE UP
NRBC # FLD: 0 K/UL — SIGNIFICANT CHANGE UP
PHOSPHATE SERPL-MCNC: 3.5 MG/DL — SIGNIFICANT CHANGE UP (ref 2.5–4.5)
PLATELET # BLD AUTO: 339 K/UL — SIGNIFICANT CHANGE UP (ref 150–400)
POTASSIUM SERPL-MCNC: 3.5 MMOL/L — SIGNIFICANT CHANGE UP (ref 3.5–5.3)
POTASSIUM SERPL-SCNC: 3.5 MMOL/L — SIGNIFICANT CHANGE UP (ref 3.5–5.3)
RBC # BLD: 5.29 M/UL — HIGH (ref 3.8–5.2)
RBC # FLD: 15.9 % — HIGH (ref 10.3–14.5)
SODIUM SERPL-SCNC: 141 MMOL/L — SIGNIFICANT CHANGE UP (ref 135–145)
WBC # BLD: 8.98 K/UL — SIGNIFICANT CHANGE UP (ref 3.8–10.5)
WBC # FLD AUTO: 8.98 K/UL — SIGNIFICANT CHANGE UP (ref 3.8–10.5)

## 2021-10-19 PROCEDURE — 78452 HT MUSCLE IMAGE SPECT MULT: CPT | Mod: 26

## 2021-10-19 PROCEDURE — 93018 CV STRESS TEST I&R ONLY: CPT | Mod: GC

## 2021-10-19 PROCEDURE — 93016 CV STRESS TEST SUPVJ ONLY: CPT | Mod: GC

## 2021-10-19 RX ORDER — ATORVASTATIN CALCIUM 80 MG/1
1 TABLET, FILM COATED ORAL
Qty: 0 | Refills: 0 | DISCHARGE
Start: 2021-10-19

## 2021-10-19 RX ORDER — ALBUTEROL 90 UG/1
2 AEROSOL, METERED ORAL
Qty: 1 | Refills: 0
Start: 2021-10-19

## 2021-10-19 RX ORDER — INFLUENZA VIRUS VACCINE 15; 15; 15; 15 UG/.5ML; UG/.5ML; UG/.5ML; UG/.5ML
0.5 SUSPENSION INTRAMUSCULAR ONCE
Refills: 0 | Status: DISCONTINUED | OUTPATIENT
Start: 2021-10-19 | End: 2021-10-19

## 2021-10-19 RX ORDER — INFLUENZA VIRUS VACCINE 15; 15; 15; 15 UG/.5ML; UG/.5ML; UG/.5ML; UG/.5ML
0.7 SUSPENSION INTRAMUSCULAR ONCE
Refills: 0 | Status: DISCONTINUED | OUTPATIENT
Start: 2021-10-19 | End: 2021-10-19

## 2021-10-19 RX ADMIN — Medication 2: at 13:09

## 2021-10-19 RX ADMIN — Medication 50 MILLIGRAM(S): at 05:28

## 2021-10-19 RX ADMIN — AMLODIPINE BESYLATE 10 MILLIGRAM(S): 2.5 TABLET ORAL at 05:28

## 2021-10-19 RX ADMIN — Medication 81 MILLIGRAM(S): at 13:10

## 2021-10-19 RX ADMIN — Medication 6: at 17:20

## 2021-10-19 RX ADMIN — ENOXAPARIN SODIUM 40 MILLIGRAM(S): 100 INJECTION SUBCUTANEOUS at 05:31

## 2021-10-19 RX ADMIN — Medication 50 MILLIGRAM(S): at 17:21

## 2021-10-19 RX ADMIN — PANTOPRAZOLE SODIUM 40 MILLIGRAM(S): 20 TABLET, DELAYED RELEASE ORAL at 05:28

## 2021-10-19 NOTE — PROGRESS NOTE ADULT - ASSESSMENT
Echo 10/17/21: EF 68%, nl lv sys fx, mild diastolic dysfx, mild TR  Echo 10/18/20: normal LV function, no significant valve disease, EF 73%    71 Hebrew speaking female past medical history of HTN, HLD, and DM p/w SANDERS.      #SANDERS   -cv stable, no cp, EKG with no acute ischemia or arrythmia  -BRNP negative, CXR clear.   -pt. resting comfortably with no acute distress   -no ADHF on exam   -covid negative  -Echo noted with EF 68%, nl lv sys fx, mild diastolic dysfx, mild TR  -stress test pending  -c/w asa 81mg daily     #htn  -stable  -cont norvasc, bb     dvt ppx    plan discussed with ACP   DCP pending stress results 
Echo 10/17/21: EF 68%, nl lv sys fx, mild diastolic dysfx, mild TR  Echo 10/18/20: normal LV function, no significant valve disease, EF 73%    71 English speaking female past medical history of HTN, HLD, and DM p/w SANDERS.      #SANDERS   -cv stable, no cp, EKG with no acute ischemia or arrythmia  -BRNP negative, CXR clear.   -pt. resting comfortably with no acute distress   -no ADHF on exam   -covid negative  -Echo noted with EF 68%, nl lv sys fx, mild diastolic dysfx, mild TR  -stress test pending  -c/w asa 81mg daily     #htn  -stable  -cont norvasc, bb     dvt ppx    plan discussed with ACP and family at bedside 
Echo 10/18/20: normal LV function, no significant valve disease, EF 73%    71 Estonian speaking female past medical history of HTN, HLD, and DM p/w SANDERS.      #SANDERS   -cv stable, no cp, EKG with no acute ischemia or arrythmia  -BRNP negative, CXR clear.   -pt. resting comfortably with no acute distress   -no ADHF on exam   -r/o RVP , covid swab pending  -will plan for echo to eval lvef, valve function  -discussed stress testing with patient and family, they would like to first proceed with echo first and decide on stress test pending clinical course and echo results.   -c/w asa 81mg daily if no CI    #htn  -stable  -resume norvasc, bb     dvt ppx    
Echo 10/18/20: normal LV function, no significant valve disease, EF 73%    71 Slovak speaking female past medical history of HTN, HLD, and DM p/w SANDERS.      #SANDERS   -cv stable, no cp, EKG with no acute ischemia or arrythmia  -BRNP negative, CXR clear.   -pt. resting comfortably with no acute distress   -no ADHF on exam   covid negative  -will plan for echo to eval lvef, valve function  -stress test pending  -c/w asa 81mg daily     #htn  -stable  -cont norvasc, bb     dvt ppx

## 2021-10-19 NOTE — PROGRESS NOTE ADULT - SUBJECTIVE AND OBJECTIVE BOX
CC: no cp/sob    TELEMETRY:     PHYSICAL EXAM:    T(C): 36.6 (10-17-21 @ 07:24), Max: 36.8 (10-16-21 @ 12:15)  HR: 69 (10-17-21 @ 07:24) (65 - 74)  BP: 137/72 (10-17-21 @ 07:24) (128/70 - 148/74)  RR: 16 (10-17-21 @ 07:24) (16 - 18)  SpO2: 100% (10-17-21 @ 07:24) (100% - 100%)  Wt(kg): --  I&O's Summary      Appearance: Normal	  Cardiovascular: Normal S1 S2,RRR, No JVD, No murmurs  Respiratory: Lungs clear to auscultation	  Gastrointestinal:  Soft, Non-tender, + BS	  Extremities: Normal range of motion, No clubbing, cyanosis or edema  Vascular: Peripheral pulses palpable 2+ bilaterally     LABS:	 	                          12.5   9.61  )-----------( 311      ( 17 Oct 2021 07:03 )             38.4     10-17    138  |  101  |  10  ----------------------------<  136<H>  3.6   |  24  |  0.45<L>    Ca    9.5      17 Oct 2021 07:03  Phos  2.8     10-17  Mg     1.90     10-17    TPro  8.5<H>  /  Alb  5.0  /  TBili  0.8  /  DBili  x   /  AST  58<H>  /  ALT  51<H>  /  AlkPhos  86  10-15          CARDIAC MARKERS:        MEDICATIONS  (STANDING):  ALBUTerol    90 MICROgram(s) HFA Inhaler 2 Puff(s) Inhalation every 6 hours  amLODIPine   Tablet 10 milliGRAM(s) Oral daily  aspirin  chewable 81 milliGRAM(s) Oral daily  atorvastatin 20 milliGRAM(s) Oral at bedtime  dextrose 40% Gel 15 Gram(s) Oral once  dextrose 5%. 1000 milliLiter(s) (50 mL/Hr) IV Continuous <Continuous>  dextrose 5%. 1000 milliLiter(s) (100 mL/Hr) IV Continuous <Continuous>  dextrose 50% Injectable 25 Gram(s) IV Push once  dextrose 50% Injectable 12.5 Gram(s) IV Push once  dextrose 50% Injectable 25 Gram(s) IV Push once  enoxaparin Injectable 40 milliGRAM(s) SubCutaneous every 24 hours  glucagon  Injectable 1 milliGRAM(s) IntraMuscular once  insulin glargine Injectable (LANTUS) 24 Unit(s) SubCutaneous at bedtime  insulin lispro (ADMELOG) corrective regimen sliding scale   SubCutaneous Before meals and at bedtime  metoprolol tartrate 50 milliGRAM(s) Oral two times a day  pantoprazole    Tablet 40 milliGRAM(s) Oral before breakfast  
CARDIOLOGY FOLLOW UP - Dr. Breen  Date of Service: 10/19/21  CC:  ID [id number]  no cp  reported occasional moser   +dry cough    Review of Systems:  Constitutional: No fever, weight loss, or fatigue  Respiratory: No cough, wheezing, or hemoptysis, no shortness of breath  Cardiovascular: No chest pain, palpitations, passing out, dizziness, or leg swelling  Gastrointestinal: No abd or epigastric pain.  No nausea, vomiting, or hematemesis; no diarrhea or constipation, no melena or hematochezia  Vascular: no edema       PHYSICAL EXAM:  T(C): 36.7 (10-19-21 @ 04:55), Max: 36.8 (10-18-21 @ 12:09)  HR: 66 (10-19-21 @ 04:55) (66 - 87)  BP: 148/67 (10-19-21 @ 04:55) (125/60 - 148/67)  RR: 16 (10-19-21 @ 04:55) (16 - 18)  SpO2: 100% (10-19-21 @ 04:55) (97% - 100%)  Wt(kg): --  I&O's Summary      Appearance: Normal	  Cardiovascular: Normal S1 S2,RRR, No JVD, No murmurs  Respiratory: Lungs clear to auscultation	  Gastrointestinal:  Soft, Non-tender, + BS	  Extremities: Normal range of motion, No clubbing, cyanosis or edema      Home Medications:  amLODIPine 10 mg oral tablet: 1 tab(s) orally once a day (15 Oct 2021 17:56)  aspirin 81 mg oral tablet: 1 tab(s) orally once a day (15 Oct 2021 17:56)  Januvia 100 mg oral tablet: 1 tab(s) orally once a day (15 Oct 2021 17:56)  Metoprolol Tartrate 50 mg oral tablet: 1 tab(s) orally 2 times a day (15 Oct 2021 17:56)  Vitamin B12 1000 mcg oral tablet: 1 tab(s) orally once a day (15 Oct 2021 17:56)      MEDICATIONS  (STANDING):  ALBUTerol    90 MICROgram(s) HFA Inhaler 2 Puff(s) Inhalation every 6 hours  amLODIPine   Tablet 10 milliGRAM(s) Oral daily  aspirin  chewable 81 milliGRAM(s) Oral daily  atorvastatin 20 milliGRAM(s) Oral at bedtime  dextrose 40% Gel 15 Gram(s) Oral once  dextrose 5%. 1000 milliLiter(s) (50 mL/Hr) IV Continuous <Continuous>  dextrose 5%. 1000 milliLiter(s) (100 mL/Hr) IV Continuous <Continuous>  dextrose 50% Injectable 25 Gram(s) IV Push once  dextrose 50% Injectable 12.5 Gram(s) IV Push once  dextrose 50% Injectable 25 Gram(s) IV Push once  enoxaparin Injectable 40 milliGRAM(s) SubCutaneous every 24 hours  glucagon  Injectable 1 milliGRAM(s) IntraMuscular once  insulin glargine Injectable (LANTUS) 24 Unit(s) SubCutaneous at bedtime  insulin lispro (ADMELOG) corrective regimen sliding scale   SubCutaneous Before meals and at bedtime  metoprolol tartrate 50 milliGRAM(s) Oral two times a day  pantoprazole    Tablet 40 milliGRAM(s) Oral before breakfast      TELEMETRY: 	NSR    ECG:  	  RADIOLOGY:   DIAGNOSTIC TESTING:  [ ] Echocardiogram:  [ ]  Catheterization:  [ ] Stress Test:    OTHER: 	    LABS:	 	    Troponin T, High Sensitivity Result: <6 ng/L (10-15 @ 14:43)                          12.8   8.98  )-----------( 339      ( 19 Oct 2021 06:27 )             39.7     10-19    141  |  103  |  12  ----------------------------<  131<H>  3.5   |  23  |  0.45<L>    Ca    9.6      19 Oct 2021 06:27  Phos  3.5     10-19  Mg     1.80     10-19              
CC: no cp/sob    TELEMETRY:     PHYSICAL EXAM:    T(C): 36.7 (10-16-21 @ 06:43), Max: 36.8 (10-15-21 @ 12:06)  HR: 70 (10-16-21 @ 06:43) (60 - 81)  BP: 155/86 (10-16-21 @ 06:43) (128/65 - 155/86)  RR: 18 (10-16-21 @ 06:43) (16 - 18)  SpO2: 100% (10-16-21 @ 06:43) (98% - 100%)  Wt(kg): --  I&O's Summary      Appearance: Normal	  Cardiovascular: Normal S1 S2,RRR, No JVD, No murmurs  Respiratory: Lungs clear to auscultation	  Gastrointestinal:  Soft, Non-tender, + BS	  Extremities: Normal range of motion, No clubbing, cyanosis or edema  Vascular: Peripheral pulses palpable 2+ bilaterally     LABS:	 	                          12.9   7.85  )-----------( 299      ( 16 Oct 2021 08:35 )             38.6     10-16    143  |  103  |  9   ----------------------------<  128<H>  3.5   |  25  |  0.43<L>    Ca    9.4      16 Oct 2021 08:35  Phos  3.3     10-16  Mg     1.60     10-16    TPro  8.5<H>  /  Alb  5.0  /  TBili  0.8  /  DBili  x   /  AST  58<H>  /  ALT  51<H>  /  AlkPhos  86  10-15          CARDIAC MARKERS:              
CARDIOLOGY FOLLOW UP - Dr. Breen  Date of Service: 10/18/21  CC: denies cp, sob, and palpitations     Review of Systems:  Constitutional: No fever, weight loss, or fatigue  Respiratory: No cough, wheezing, or hemoptysis, no shortness of breath  Cardiovascular: No chest pain, palpitations, passing out, dizziness, or leg swelling  Gastrointestinal: No abd or epigastric pain.  No nausea, vomiting, or hematemesis; no diarrhea or constipation, no melena or hematochezia  Vascular: no edema       PHYSICAL EXAM:  T(C): 36.6 (10-18-21 @ 05:06), Max: 36.6 (10-17-21 @ 20:45)  HR: 70 (10-18-21 @ 05:06) (69 - 70)  BP: 137/62 (10-18-21 @ 05:06) (123/50 - 137/62)  RR: 17 (10-18-21 @ 05:06) (17 - 17)  SpO2: 99% (10-18-21 @ 05:06) (97% - 99%)  Wt(kg): --  I&O's Summary      Appearance: Normal	  Cardiovascular: Normal S1 S2,RRR, No JVD, No murmurs  Respiratory: Lungs clear to auscultation	  Gastrointestinal:  Soft, Non-tender, + BS	  Extremities: Normal range of motion, No clubbing, cyanosis or edema      Home Medications:  amLODIPine 10 mg oral tablet: 1 tab(s) orally once a day (15 Oct 2021 17:56)  aspirin 81 mg oral tablet: 1 tab(s) orally once a day (15 Oct 2021 17:56)  Januvia 100 mg oral tablet: 1 tab(s) orally once a day (15 Oct 2021 17:56)  Metoprolol Tartrate 50 mg oral tablet: 1 tab(s) orally 2 times a day (15 Oct 2021 17:56)  Vitamin B12 1000 mcg oral tablet: 1 tab(s) orally once a day (15 Oct 2021 17:56)      MEDICATIONS  (STANDING):  ALBUTerol    90 MICROgram(s) HFA Inhaler 2 Puff(s) Inhalation every 6 hours  amLODIPine   Tablet 10 milliGRAM(s) Oral daily  aspirin  chewable 81 milliGRAM(s) Oral daily  atorvastatin 20 milliGRAM(s) Oral at bedtime  dextrose 40% Gel 15 Gram(s) Oral once  dextrose 5%. 1000 milliLiter(s) (50 mL/Hr) IV Continuous <Continuous>  dextrose 5%. 1000 milliLiter(s) (100 mL/Hr) IV Continuous <Continuous>  dextrose 50% Injectable 25 Gram(s) IV Push once  dextrose 50% Injectable 12.5 Gram(s) IV Push once  dextrose 50% Injectable 25 Gram(s) IV Push once  enoxaparin Injectable 40 milliGRAM(s) SubCutaneous every 24 hours  glucagon  Injectable 1 milliGRAM(s) IntraMuscular once  insulin glargine Injectable (LANTUS) 24 Unit(s) SubCutaneous at bedtime  insulin lispro (ADMELOG) corrective regimen sliding scale   SubCutaneous Before meals and at bedtime  metoprolol tartrate 50 milliGRAM(s) Oral two times a day  pantoprazole    Tablet 40 milliGRAM(s) Oral before breakfast      TELEMETRY: NSR     ECG:  	  RADIOLOGY:    DIAGNOSTIC TESTING:   [ ] Echocardiogram: < from: Transthoracic Echocardiogram (10.17.21 @ 08:38) >  DIMENSIONS:  Dimensions:     Normal Values:  LA:     3.5 cm    2.0 - 4.0 cm  Ao:     2.9 cm    2.0 - 3.8 cm  SEPTUM: 0.5 cm    0.6 - 1.2 cm  PWT:    0.7 cm    0.6 - 1.1 cm  LVIDd:  4.0 cm    3.0 - 5.6 cm  LVIDs:  2.5 cm    1.8 - 4.0 cm  Derived Variables:  LVMI: 44 g/m2  RWT: 0.35  Fractional short: 38 %  Ejection Fraction (Teicholtz): 68 %  ------------------------------------------------------------------------  OBSERVATIONS:  Mitral Valve: Normal mitral valve.  Aortic Root: Normal aortic root.  Aortic Valve: Normal trileaflet aortic valve.  Left Atrium: LA volume index = 13 cc/m2.  Left Ventricle: Normal left ventricular systolic function.  No segmental wall motion abnormalities. Normal left  ventricular internal dimensions and wall thicknesses. Mild  diastolic dysfunction (Stage I).  Right Heart: Normal right atrium. Normal right ventricular  size and function. Normal tricuspid valve. Mild tricuspid  regurgitation. Normal pulmonic valve.  Pericardium/PleuraNormal pericardium with no pericardial  effusion.  Hemodynamic: Estimated right ventricular systolic pressure  equals 26 mm Hg, assuming right atrial pressure equals 10  mm Hg, consistent with normal pulmonary pressures.  ------------------------------------------------------------------------  CONCLUSIONS:  1. Normal left ventricular internal dimensions and wall  thicknesses.  2. Normal left ventricular systolic function. Nosegmental  wall motion abnormalities.  3. Mild diastolic dysfunction (Stage I).  4. Normal right ventricular size and function.  5. Normal tricuspid valve. Mild tricuspid regurgitation.  6. Estimated pulmonary artery systolic pressure equals 26  mm Hg, assuming right atrial pressure equals 10  mm Hg,  consistent with normal pulmonary pressures.    < end of copied text >    [ ]  Catheterization:  [ ] Stress Test:    OTHER: 	    LABS:	 	    Troponin T, High Sensitivity Result: <6 ng/L (10-15 @ 14:43)                          12.7   9.18  )-----------( 340      ( 18 Oct 2021 07:37 )             38.6     10-18    141  |  104  |  11  ----------------------------<  104<H>  3.5   |  23  |  0.47<L>    Ca    9.1      18 Oct 2021 07:37  Phos  3.3     10-18  Mg     1.80     10-18

## 2021-10-19 NOTE — DISCHARGE NOTE PROVIDER - CARE PROVIDER_API CALL
Myles Breen)  Cardiology  [address], [address]  [place] [address]  Phone: [phone]  Fax: [phone]  Follow Up Time: 2 weeks

## 2021-10-19 NOTE — DISCHARGE NOTE PROVIDER - NSDCMRMEDTOKEN_GEN_ALL_CORE_FT
amLODIPine 10 mg oral tablet: 1 tab(s) orally once a day  aspirin 81 mg oral tablet: 1 tab(s) orally once a day  Januvia 100 mg oral tablet: 1 tab(s) orally once a day  Lantus Solostar Pen 100 units/mL subcutaneous solution: 24 unit(s) subcutaneous once a day at 6pm   Lipitor 20 mg oral tablet: 1 tab(s) orally once a day (at bedtime)  metFORMIN 1000 mg oral tablet: 1 tab(s) orally 2 times a day   Metoprolol Tartrate 50 mg oral tablet: 1 tab(s) orally 2 times a day  pantoprazole 40 mg oral delayed release tablet: 1 tab(s) orally once a day (before a meal)  Vitamin B12 1000 mcg oral tablet: 1 tab(s) orally once a day   albuterol 90 mcg/inh inhalation aerosol: 2 puff(s) inhaled every 6 hours as needed for shortness of breath and/or wheezing   amLODIPine 10 mg oral tablet: 1 tab(s) orally once a day  aspirin 81 mg oral tablet: 1 tab(s) orally once a day  Januvia 100 mg oral tablet: 1 tab(s) orally once a day  Lantus Solostar Pen 100 units/mL subcutaneous solution: 24 unit(s) subcutaneous once a day at 6pm   Lipitor 20 mg oral tablet: 1 tab(s) orally once a day (at bedtime)  metFORMIN 1000 mg oral tablet: 1 tab(s) orally 2 times a day   Metoprolol Tartrate 50 mg oral tablet: 1 tab(s) orally 2 times a day  pantoprazole 40 mg oral delayed release tablet: 1 tab(s) orally once a day (before a meal)  Tessalon Perles 100 mg oral capsule: 1 cap(s) orally every 8 hours as needed for cough  Vitamin B12 1000 mcg oral tablet: 1 tab(s) orally once a day

## 2021-10-19 NOTE — DISCHARGE NOTE PROVIDER - HOSPITAL COURSE
70 y/o F with PMH HTN, DM who presents for chest pain/SANDERS x1 month. She reports worsening dyspnea on exertion for the past 15 days, admitted for further work up.      Problem 1. Dyspnea.   - Pt reporting dyspnea on exertion for 15 days and acutely worsening in the past 3-4 days. VSS - sating well on RA.  - Wheezing noted on initial exam, improved s/p Albuterol.   - EKG with no acute ischemia or arrythmia  - BRNP negative, Troponin neg, CXR clear.   -pt. resting comfortably with no acute distress   -no ADHF on exam   -covid negative  -Echo noted with EF 68%, nl lv sys fx, mild diastolic dysfx, mild TR  -stress test study normal   -c/w asa 81mg daily      Problem 2. HTN (hypertension).   - Normotensive.   - Continue with home meds.    3. Diabetes.   - On Lantus 24U + januvia and metformin.   - Hold oral meds while inpatient, restart on discharge  - C/w lantus 24U qhs   - A1C pending 8.4%  - Outpt f/u for better glucose control and diabetes management   - Diabetes diet      Problem 4. Transaminitis.   ·  Plan: Mild transaminitis. Present on prior admission one year ago. Stable. Likely hepatic steatosis.   - Outpatient follow up.    After discussion with Attending Dr. Breen on 10/19/2021 pt was deemed medically stable for discharge home. Son and pt in agreement.      72 y/o F with PMH HTN, DM who presents for chest pain/SANDERS x1 month. She reports worsening dyspnea on exertion for the past 15 days, admitted for further work up.      Problem 1. Dyspnea.   - Pt reporting dyspnea on exertion for 15 days and acutely worsening in the past 3-4 days. VSS - sating well on RA.  - Wheezing noted on initial exam, improved s/p Albuterol.   - EKG with no acute ischemia or arrythmia  - BRNP negative, Troponin neg, CXR clear.   -pt. resting comfortably with no acute distress   -no ADHF on exam   -covid negative  -Echo noted with EF 68%, nl lv sys fx, mild diastolic dysfx, mild TR  -stress test study normal   -c/w asa 81mg daily      Problem 2. HTN (hypertension).   - Normotensive.   - Continue with home meds.    3. Diabetes.   - On Lantus 24U + januvia and metformin.   - Hold oral meds while inpatient, restart on discharge  - C/w lantus 24U qhs   - A1C pending 8.4%  - Outpt f/u for better glucose control and diabetes management   - Diabetes diet      Problem 4. Transaminitis.   ·  Plan: Mild transaminitis. Present on prior admission one year ago. Stable. Likely hepatic steatosis.   - Outpatient follow up.    After discussion with Attending Dr. Breen on 10/19/2021 pt was deemed medically stable for discharge home. Patient is in agreement; [employer] ID#[id number] was used for translation services.

## 2021-10-19 NOTE — DISCHARGE NOTE PROVIDER - NSDCCPCAREPLAN_GEN_ALL_CORE_FT
PRINCIPAL DISCHARGE DIAGNOSIS  Diagnosis: SANDERS (dyspnea on exertion)  Assessment and Plan of Treatment: You came to hospital for difficulty breathing and shortness of breath. You were treated with albuterol and got better. You did not require supplemental oxygen. EKG was done and unremarkable. Chest Xray showed clear lungs. Work up was done including Echocardiogram which was mostly unremarkable.   Stress Test was normal. Continue with ASA and Statin as directed. Albuterol as needed.         SECONDARY DISCHARGE DIAGNOSES  Diagnosis: Diabetes  Assessment and Plan of Treatment: Goal: To maintain a normal blood glucose level and HgA1C level < 5.7 and to prevent diabetic complications such as uncontrolled diabetes, diabetic coma, blindness, renal failure, and amputations. You A1C was 8.4%, Continue taking your diabetes as directed and follow up with your PCP or endocrinologsit to manage your glucose control.   Plan: Monitor finger sticks pre-meal and bedtime, low salt, fat and carbohydrate diet, minimize glucose intake.  Exercise daily for at least 30 minutes and weight loss.  Follow up with primary care physician and endocrinologist for routine Hemoglobin A1C checks and management.  Follow up with your ophthalmologist for routine yearly vision exams.    Diagnosis: HTN (hypertension)  Assessment and Plan of Treatment: Goal: To maintain a normal blood pressure to prevent heart attack, stroke and renal failure.   Plan: Low sodium and fat diet, continue anti-hypertensive medications, and follow up with primary care physician.     PRINCIPAL DISCHARGE DIAGNOSIS  Diagnosis: SANDERS (dyspnea on exertion)  Assessment and Plan of Treatment: You came to hospital for difficulty breathing and shortness of breath. You were treated with albuterol and got better. You did not require supplemental oxygen. EKG was done and unremarkable. Chest Xray showed clear lungs. Work up was done including Echocardiogram which was mostly unremarkable.   Stress Test was normal. Continue with ASA and Statin as directed. Albuterol as needed. Tesslon Pearls as needed for cough.        SECONDARY DISCHARGE DIAGNOSES  Diagnosis: Diabetes  Assessment and Plan of Treatment: Goal: To maintain a normal blood glucose level and HgA1C level < 5.7 and to prevent diabetic complications such as uncontrolled diabetes, diabetic coma, blindness, renal failure, and amputations. You A1C was 8.4%, Continue taking your diabetes as directed and follow up with your PCP or endocrinologsit to manage your glucose control.   Plan: Monitor finger sticks pre-meal and bedtime, low salt, fat and carbohydrate diet, minimize glucose intake.  Exercise daily for at least 30 minutes and weight loss.  Follow up with primary care physician and endocrinologist for routine Hemoglobin A1C checks and management.  Follow up with your ophthalmologist for routine yearly vision exams.    Diagnosis: HTN (hypertension)  Assessment and Plan of Treatment: Goal: To maintain a normal blood pressure to prevent heart attack, stroke and renal failure.   Plan: Low sodium and fat diet, continue anti-hypertensive medications, and follow up with primary care physician.

## 2021-10-19 NOTE — PROGRESS NOTE ADULT - TIME BILLING
Agree with above NP note.  cv stable  await stress  d/c planning pending stress
Agree with above NP note.  cv stable  await stress  d/c planning pending stress

## 2021-10-19 NOTE — DISCHARGE NOTE NURSING/CASE MANAGEMENT/SOCIAL WORK - PATIENT PORTAL LINK FT
You can access the FollowMyHealth Patient Portal offered by [facility] by registering at the following website: [email or web address]. By joining [employer]’s FollowMyHealth portal, you will also be able to view your health information using other applications (apps) compatible with our system.

## 2021-10-25 NOTE — H&P ADULT - PROBLEM SELECTOR PLAN 1
Evaluation Time: 11:30-12:10pm; pt chart thoroughly reviewed prior to OT evaluation/yes ekg/telemetry, ce x 2, mg, tsh, echo, consider ischemic eval, consider cardio c/s, cont asa

## 2022-03-22 ENCOUNTER — INPATIENT (INPATIENT)
Facility: HOSPITAL | Age: 72
LOS: 4 days | Discharge: HOME HEALTH | End: 2022-03-27
Attending: STUDENT IN AN ORGANIZED HEALTH CARE EDUCATION/TRAINING PROGRAM | Admitting: STUDENT IN AN ORGANIZED HEALTH CARE EDUCATION/TRAINING PROGRAM
Payer: MEDICAID

## 2022-03-22 VITALS
HEIGHT: 60 IN | HEART RATE: 88 BPM | SYSTOLIC BLOOD PRESSURE: 123 MMHG | RESPIRATION RATE: 16 BRPM | OXYGEN SATURATION: 99 % | TEMPERATURE: 98 F | DIASTOLIC BLOOD PRESSURE: 54 MMHG

## 2022-03-22 DIAGNOSIS — R10.9 UNSPECIFIED ABDOMINAL PAIN: ICD-10-CM

## 2022-03-22 LAB
ALBUMIN SERPL ELPH-MCNC: 4.4 G/DL — SIGNIFICANT CHANGE UP (ref 3.3–5)
ALP SERPL-CCNC: 76 U/L — SIGNIFICANT CHANGE UP (ref 40–120)
ALT FLD-CCNC: 63 U/L — HIGH (ref 4–33)
ANION GAP SERPL CALC-SCNC: 16 MMOL/L — HIGH (ref 7–14)
APPEARANCE UR: CLEAR — SIGNIFICANT CHANGE UP
AST SERPL-CCNC: 71 U/L — HIGH (ref 4–32)
BASE EXCESS BLDV CALC-SCNC: -1.5 MMOL/L — SIGNIFICANT CHANGE UP (ref -2–3)
BASOPHILS # BLD AUTO: 0.08 K/UL — SIGNIFICANT CHANGE UP (ref 0–0.2)
BASOPHILS NFR BLD AUTO: 0.8 % — SIGNIFICANT CHANGE UP (ref 0–2)
BILIRUB SERPL-MCNC: 0.5 MG/DL — SIGNIFICANT CHANGE UP (ref 0.2–1.2)
BILIRUB UR-MCNC: NEGATIVE — SIGNIFICANT CHANGE UP
BLOOD GAS VENOUS COMPREHENSIVE RESULT: SIGNIFICANT CHANGE UP
BUN SERPL-MCNC: 13 MG/DL — SIGNIFICANT CHANGE UP (ref 7–23)
CALCIUM SERPL-MCNC: 9.5 MG/DL — SIGNIFICANT CHANGE UP (ref 8.4–10.5)
CHLORIDE BLDV-SCNC: 103 MMOL/L — SIGNIFICANT CHANGE UP (ref 96–108)
CHLORIDE SERPL-SCNC: 100 MMOL/L — SIGNIFICANT CHANGE UP (ref 98–107)
CK MB CFR SERPL CALC: 2 NG/ML — SIGNIFICANT CHANGE UP
CO2 BLDV-SCNC: 25 MMOL/L — SIGNIFICANT CHANGE UP (ref 22–26)
CO2 SERPL-SCNC: 21 MMOL/L — LOW (ref 22–31)
COLOR SPEC: COLORLESS — SIGNIFICANT CHANGE UP
CREAT SERPL-MCNC: 0.45 MG/DL — LOW (ref 0.5–1.3)
DIFF PNL FLD: NEGATIVE — SIGNIFICANT CHANGE UP
EGFR: 102 ML/MIN/1.73M2 — SIGNIFICANT CHANGE UP
EOSINOPHIL # BLD AUTO: 0.32 K/UL — SIGNIFICANT CHANGE UP (ref 0–0.5)
EOSINOPHIL NFR BLD AUTO: 3.2 % — SIGNIFICANT CHANGE UP (ref 0–6)
FLUAV AG NPH QL: SIGNIFICANT CHANGE UP
FLUBV AG NPH QL: SIGNIFICANT CHANGE UP
GAS PNL BLDV: 137 MMOL/L — SIGNIFICANT CHANGE UP (ref 136–145)
GAS PNL BLDV: SIGNIFICANT CHANGE UP
GLUCOSE BLDC GLUCOMTR-MCNC: 179 MG/DL — HIGH (ref 70–99)
GLUCOSE BLDV-MCNC: 207 MG/DL — HIGH (ref 70–99)
GLUCOSE SERPL-MCNC: 163 MG/DL — HIGH (ref 70–99)
GLUCOSE UR QL: NEGATIVE — SIGNIFICANT CHANGE UP
HCO3 BLDV-SCNC: 24 MMOL/L — SIGNIFICANT CHANGE UP (ref 22–29)
HCT VFR BLD CALC: 35.6 % — SIGNIFICANT CHANGE UP (ref 34.5–45)
HCT VFR BLDA CALC: 34 % — LOW (ref 34.5–46.5)
HGB BLD CALC-MCNC: 11.3 G/DL — LOW (ref 11.5–15.5)
HGB BLD-MCNC: 11.6 G/DL — SIGNIFICANT CHANGE UP (ref 11.5–15.5)
IANC: 6.2 K/UL — SIGNIFICANT CHANGE UP (ref 1.5–8.5)
IMM GRANULOCYTES NFR BLD AUTO: 0.3 % — SIGNIFICANT CHANGE UP (ref 0–1.5)
KETONES UR-MCNC: NEGATIVE — SIGNIFICANT CHANGE UP
LACTATE BLDV-MCNC: 3.7 MMOL/L — HIGH (ref 0.5–2)
LEUKOCYTE ESTERASE UR-ACNC: NEGATIVE — SIGNIFICANT CHANGE UP
LIDOCAIN IGE QN: 105 U/L — HIGH (ref 7–60)
LYMPHOCYTES # BLD AUTO: 2.73 K/UL — SIGNIFICANT CHANGE UP (ref 1–3.3)
LYMPHOCYTES # BLD AUTO: 27.5 % — SIGNIFICANT CHANGE UP (ref 13–44)
MAGNESIUM SERPL-MCNC: 1.4 MG/DL — LOW (ref 1.6–2.6)
MCHC RBC-ENTMCNC: 24.2 PG — LOW (ref 27–34)
MCHC RBC-ENTMCNC: 32.6 GM/DL — SIGNIFICANT CHANGE UP (ref 32–36)
MCV RBC AUTO: 74.2 FL — LOW (ref 80–100)
MONOCYTES # BLD AUTO: 0.57 K/UL — SIGNIFICANT CHANGE UP (ref 0–0.9)
MONOCYTES NFR BLD AUTO: 5.7 % — SIGNIFICANT CHANGE UP (ref 2–14)
NEUTROPHILS # BLD AUTO: 6.2 K/UL — SIGNIFICANT CHANGE UP (ref 1.8–7.4)
NEUTROPHILS NFR BLD AUTO: 62.5 % — SIGNIFICANT CHANGE UP (ref 43–77)
NITRITE UR-MCNC: NEGATIVE — SIGNIFICANT CHANGE UP
NRBC # BLD: 0 /100 WBCS — SIGNIFICANT CHANGE UP
NRBC # FLD: 0 K/UL — SIGNIFICANT CHANGE UP
NT-PROBNP SERPL-SCNC: 32 PG/ML — SIGNIFICANT CHANGE UP
PCO2 BLDV: 41 MMHG — SIGNIFICANT CHANGE UP (ref 39–42)
PH BLDV: 7.37 — SIGNIFICANT CHANGE UP (ref 7.32–7.43)
PH UR: 7.5 — SIGNIFICANT CHANGE UP (ref 5–8)
PLATELET # BLD AUTO: 311 K/UL — SIGNIFICANT CHANGE UP (ref 150–400)
PO2 BLDV: 94 MMHG — SIGNIFICANT CHANGE UP
POTASSIUM BLDV-SCNC: 3.9 MMOL/L — SIGNIFICANT CHANGE UP (ref 3.5–5.1)
POTASSIUM SERPL-MCNC: 3.9 MMOL/L — SIGNIFICANT CHANGE UP (ref 3.5–5.3)
POTASSIUM SERPL-SCNC: 3.9 MMOL/L — SIGNIFICANT CHANGE UP (ref 3.5–5.3)
PROT SERPL-MCNC: 7.3 G/DL — SIGNIFICANT CHANGE UP (ref 6–8.3)
PROT UR-MCNC: NEGATIVE — SIGNIFICANT CHANGE UP
RBC # BLD: 4.8 M/UL — SIGNIFICANT CHANGE UP (ref 3.8–5.2)
RBC # FLD: 15.9 % — HIGH (ref 10.3–14.5)
RSV RNA NPH QL NAA+NON-PROBE: SIGNIFICANT CHANGE UP
SAO2 % BLDV: 97.9 % — SIGNIFICANT CHANGE UP
SARS-COV-2 RNA SPEC QL NAA+PROBE: SIGNIFICANT CHANGE UP
SODIUM SERPL-SCNC: 137 MMOL/L — SIGNIFICANT CHANGE UP (ref 135–145)
SP GR SPEC: 1.04 — SIGNIFICANT CHANGE UP (ref 1–1.05)
TROPONIN T, HIGH SENSITIVITY RESULT: <6 NG/L — SIGNIFICANT CHANGE UP
TROPONIN T, HIGH SENSITIVITY RESULT: <6 NG/L — SIGNIFICANT CHANGE UP
UROBILINOGEN FLD QL: SIGNIFICANT CHANGE UP
WBC # BLD: 9.93 K/UL — SIGNIFICANT CHANGE UP (ref 3.8–10.5)
WBC # FLD AUTO: 9.93 K/UL — SIGNIFICANT CHANGE UP (ref 3.8–10.5)

## 2022-03-22 PROCEDURE — 93010 ELECTROCARDIOGRAM REPORT: CPT

## 2022-03-22 PROCEDURE — 99223 1ST HOSP IP/OBS HIGH 75: CPT | Mod: GC

## 2022-03-22 PROCEDURE — 74177 CT ABD & PELVIS W/CONTRAST: CPT | Mod: 26,MA

## 2022-03-22 PROCEDURE — 76705 ECHO EXAM OF ABDOMEN: CPT | Mod: 26

## 2022-03-22 PROCEDURE — 99285 EMERGENCY DEPT VISIT HI MDM: CPT | Mod: 25

## 2022-03-22 PROCEDURE — 71046 X-RAY EXAM CHEST 2 VIEWS: CPT | Mod: 26

## 2022-03-22 RX ORDER — ONDANSETRON 8 MG/1
4 TABLET, FILM COATED ORAL ONCE
Refills: 0 | Status: COMPLETED | OUTPATIENT
Start: 2022-03-22 | End: 2022-03-22

## 2022-03-22 RX ORDER — CEFTRIAXONE 500 MG/1
1000 INJECTION, POWDER, FOR SOLUTION INTRAMUSCULAR; INTRAVENOUS ONCE
Refills: 0 | Status: COMPLETED | OUTPATIENT
Start: 2022-03-22 | End: 2022-03-22

## 2022-03-22 RX ORDER — SODIUM CHLORIDE 9 MG/ML
1000 INJECTION INTRAMUSCULAR; INTRAVENOUS; SUBCUTANEOUS ONCE
Refills: 0 | Status: COMPLETED | OUTPATIENT
Start: 2022-03-22 | End: 2022-03-22

## 2022-03-22 RX ORDER — MAGNESIUM SULFATE 500 MG/ML
2 VIAL (ML) INJECTION ONCE
Refills: 0 | Status: COMPLETED | OUTPATIENT
Start: 2022-03-22 | End: 2022-03-22

## 2022-03-22 RX ADMIN — CEFTRIAXONE 100 MILLIGRAM(S): 500 INJECTION, POWDER, FOR SOLUTION INTRAMUSCULAR; INTRAVENOUS at 16:38

## 2022-03-22 RX ADMIN — ONDANSETRON 4 MILLIGRAM(S): 8 TABLET, FILM COATED ORAL at 13:10

## 2022-03-22 RX ADMIN — SODIUM CHLORIDE 1000 MILLILITER(S): 9 INJECTION INTRAMUSCULAR; INTRAVENOUS; SUBCUTANEOUS at 13:11

## 2022-03-22 RX ADMIN — SODIUM CHLORIDE 1000 MILLILITER(S): 9 INJECTION INTRAMUSCULAR; INTRAVENOUS; SUBCUTANEOUS at 16:39

## 2022-03-22 RX ADMIN — Medication 25 GRAM(S): at 20:05

## 2022-03-22 NOTE — H&P ADULT - PROBLEM SELECTOR PLAN 5
-AST/ALT 71/62  -ALP WNL at 76  -stable from previous LFT levels and w/ imaging evidence of NAFLD  -will trend LFTs

## 2022-03-22 NOTE — H&P ADULT - PROBLEM SELECTOR PLAN 3
-CT findings notable for diffuse thickening or urinary bladder, however patient has no urinary symptoms  -clean UA  -afebrile, VSS  -PE notable for mild suprapubic tenderness  -per patient has history of gastric ulcer, on pantoprazole  -flex sig 10/20 showed biopsy confirmed mild proctitis, although no current symptoms or imaging evidence of this  -differential includes UTI, mesenteric ischemia, IBS, intersitial cystitis, urinary retention, other    Plan: -CTA A/P  -bladder scan for post void residual  -urine culture  -consider GI consult if symptoms continue and workup unrevealing -CT findings notable for diffuse thickening or urinary bladder, however patient has no urinary symptoms  -clean UA  -afebrile, VSS  -PE notable for mild suprapubic tenderness  -per patient has history of gastric ulcer, on pantoprazole  -flex sig 10/20 showed biopsy confirmed mild proctitis, although no current symptoms or imaging evidence of this  -differential includes UTI, mesenteric ischemia, IBS, intersitial cystitis, urinary retention, other    Plan: -CTA A/P if lactate continues to uptrend  -bladder scan for post void residual  -urine culture

## 2022-03-22 NOTE — ED ADULT NURSE NOTE - CHIEF COMPLAINT QUOTE
Pt Setswana speaking requesting son to translate.  C/O abd pain, right hand pain,  and chest pain x2 weeks.  Saw MD and was sent here.

## 2022-03-22 NOTE — H&P ADULT - HISTORY OF PRESENT ILLNESS
70 y/o F w/ PMH T2DM, HTN, HLD, gastric ulcer (per patient) presents with SOB on exertion and chest pressure along with abdominal pain. The SOB on exertion has been present for about 1.5 months. Patient states she usually takes albuterol for SOB, however over this time period her inhaler no longer helped improve her symptoms. Her symptoms occur on exertion and are associated with a chest pressure. States that sometimes she feels minimal chest pressure at rest. Patient has been hospitalized previously in 10/2021 with similar symptoms and received workup including echo and stress test. Echo showed EF 68% and no explanation for her symptoms. Nuclear stress test was normal. Patient denies ever having palpitations or overt chest pain.     Patient's abdominal pain has been going on for "months" and she states it generally comes and goes randomly, however the pain has worsened over the past few weeks. Pain is located in the periumbilical region. Patient states the pain does not worsen with eating or moving her bowels. She has never had pain similar to this. She has not tried medication to help with the pain, but of note is chronically on pantoprazole. Endorses intermittent episodes of diarrhea. Patient occasionally endorses nausea but has not vomited. Denies hematochezia/melena. Additionally, patient denies urinary symptoms including dysuria, hematuria, urgency, although does endorse that she sometimes feels as if she is peeing more than usual.

## 2022-03-22 NOTE — ED PROVIDER NOTE - PHYSICAL EXAMINATION
Physical Exam:  General: NAD, Conversive  Eyes: EOMI, Conjunctiva and sclera clear  Neck: No JVD  Lungs: Clear to auscultation bilaterally, no wheeze, no rhonchi  Heart: Normal S1, S2, no murmurs  Abdomen: Soft, mild tenderness lower/midabdominal tenderness, nondistended, no CVA tenderness  Extremities: 2+ peripheral pulses, no edema  Psych: AAO X3  Neurologic: Non-focal

## 2022-03-22 NOTE — H&P ADULT - NSHPLABSRESULTS_GEN_ALL_CORE
LABS:                        11.6   9.93  )-----------( 311      ( 22 Mar 2022 12:50 )             35.6     22 Mar 2022 12:50    137    |  100    |  13     ----------------------------<  163    3.9     |  21     |  0.45     Ca    9.5        22 Mar 2022 12:50  Mg     1.40      22 Mar 2022 12:50    TPro  7.3    /  Alb  4.4    /  TBili  0.5    /  DBili  x      /  AST  71     /  ALT  63     /  AlkPhos  76     22 Mar 2022 12:50    Lactate: 4 --> 3.6 --> 4.1 --> 3.7     CAPILLARY BLOOD GLUCOSE      POCT Blood Glucose.: 179 mg/dL (22 Mar 2022 23:33)    BLOOD CULTURE    EKG: NSR    RADIOLOGY & ADDITIONAL TESTS:    CT A/P w/ IV contrast  Diffuse hepatic steatosis.    Diffuse thickening of the wall of the urinary bladder. Correlation with   urinalysis is suggested.    Prominent central low attenuation in the endometrial canal. Pelvic   ultrasound is recommended to assess for endometrial pathology.    Focal wall thickening is again noted in the fundus of the gallbladder.    Vessels with atherosclerotic changes.     RUQ ULTRASOUND:   Hepatic steatosis.    Contracted gallbladder.    CXR:   Clear Lungs    Imaging Personally Reviewed:  [ ] YES

## 2022-03-22 NOTE — H&P ADULT - PROBLEM SELECTOR PLAN 4
-per endocrine note in 10/20 patient sent home on 20u lantus, patient states she takes 24u lantus  -patient is on metformin 1000 BID, glipizide 10 TID, januvia 100mg daily    Plan: will start patient on 14u lantus w/ low dose ISS  -A1c in AM  -confirm medication w/ son or pharmacy in AM

## 2022-03-22 NOTE — H&P ADULT - ASSESSMENT
72 y/o F w/ PMH T2DM, HTN, HLD, gastric ulcer (per patient) presents with SOB on exertion and chest pressure along with abdominal pain found to have unexplained elevated lactate.

## 2022-03-22 NOTE — H&P ADULT - PROBLEM SELECTOR PLAN 1
-Lactate: 4 --> 3.6 --> 4.1 --> 3.7  -s/p 2L NS  -associated with abdominal pain and SOB/chest pressure described below  -etiology unclear. Differential includes infection, mesenteric ischemia, hepatic etiologies, other  -patient received 1 dose ceftriaxone in ED  -CT A/P notable for diffuse thickening of the wall of the urinary bladder    Plan: -LR @ 75 cc/hr  -CTA A/P  -blood/urine cultures  -VBG in AM -Lactate: 4 --> 3.6 --> 4.1 --> 3.7  -s/p 2L NS  -associated with abdominal pain and SOB/chest pressure described below  -etiology unclear. Differential includes infection, mesenteric ischemia, hepatic etiologies, other  -patient received 1 dose ceftriaxone in ED  -CT A/P notable for diffuse thickening of the wall of the urinary bladder    Plan: -LR @ 75 cc/hr  -CTA A/P if lactate continues to uptrend  -blood/urine cultures  -VBG in AM to trend lactate -Lactate: 4 --> 3.6 --> 4.1 --> 3.7  -s/p 2L NS  -associated with abdominal pain and SOB/chest pressure described below  -etiology unclear. Differential includes poor hepatic clearance, infection, mesenteric ischemia, other  -patient received 1 dose ceftriaxone in ED  -CT A/P notable for diffuse thickening of the wall of the urinary bladder and atherosclerotic changes in the vessels     Plan: -LR @ 75 cc/hr  -CTA A/P if lactate continues to uptrend  -blood/urine cultures  -VBG in AM to trend lactate

## 2022-03-22 NOTE — H&P ADULT - NSHPREVIEWOFSYSTEMS_GEN_ALL_CORE
CONSTITUTIONAL: No fever, weight loss, or fatigue  EYES: No eye pain, visual disturbances, or discharge  ENMT:  No difficulty hearing, tinnitus, vertigo; No sinus or throat pain  RESPIRATORY: No cough, wheezing, chills or hemoptysis; +SOB  CARDIOVASCULAR: + chest pressure, no palpitations  GASTROINTESTINAL: +abdominal pain, + nausea, no vomiting or hematemesis; +diarrhea, no constipation. No melena or hematochezia.  GENITOURINARY: No dysuria, hematuria, or incontinence  NEUROLOGICAL: No headaches, loss of strength, numbness, or tremors  SKIN: No itching, burning, rashes, or lesions   LYMPH NODES: No enlarged glands  ENDOCRINE: No heat or cold intolerance; No polydipsia or polyuria  MUSCULOSKELETAL: No joint pain or swelling;   PSYCHIATRIC: Denies depression, anxiety  HEME/LYMPH: No easy bruising, or bleeding gums  ALLERGY AND IMMUNOLOGIC: No hives or eczema

## 2022-03-22 NOTE — H&P ADULT - ATTENDING COMMENTS
72 y/o F w/ PMH T2DM, HTN, hepatic steatosis, HLD, gastric ulcer (per patient) presents with SOB on exertion and chest pressure along with periumbilical abdominal pain in setting of moderately elevated lactate. CT abd with  no compelling source of abd pain as bladder wall thickening undermined by negative UA and lack of overt urinary symptoms. Pt with no ruq pain to suggest contracted GB may be source, Would order pelvic sono given prominent low attenuation in central endometrial canal seen on CT. If lactate trends up, would obtain CTA abdomen; if not  would attribute moderately elevated lactate  to suboptimal liver clearance. Cards eval for exertional sob/cp

## 2022-03-22 NOTE — H&P ADULT - NSHPPHYSICALEXAM_GEN_ALL_CORE
Vital Signs Last 24 Hrs  T(C): 36.3 (22 Mar 2022 20:05), Max: 36.4 (22 Mar 2022 11:31)  T(F): 97.3 (22 Mar 2022 20:05), Max: 97.6 (22 Mar 2022 11:31)  HR: 86 (22 Mar 2022 20:05) (80 - 88)  BP: 124/70 (22 Mar 2022 20:05) (123/54 - 131/72)  BP(mean): --  RR: 16 (22 Mar 2022 20:05) (16 - 19)  SpO2: 100% (22 Mar 2022 20:05) (97% - 100%)    PHYSICAL EXAM:  GENERAL: NAD, well-groomed, well-developed  HEAD:  Atraumatic, Normocephalic  EYES: EOMI, PERRLA, conjunctiva and sclera clear  ENMT: No tonsillar erythema, exudates, or enlargement; Moist mucous membranes, Good dentition  NECK: Supple, No JVD  NERVOUS SYSTEM: AOX3, motor and sensation grossly intact in b/l UE and b/l LE  PSYCHIATRIC: Appropriate affect and mood  CHEST/LUNG: Clear to auscultation bilaterally; No rales, rhonchi, wheezing, or rubs  HEART: Regular rate and rhythm; No murmurs, rubs, or gallops. No LE edema  ABDOMEN: Soft, Nontender, Nondistended; Bowel sounds present  EXTREMITIES:  2+ Peripheral Pulses, No clubbing, cyanosis  SKIN: No rashes or lesions Vital Signs Last 24 Hrs  T(C): 36.3 (22 Mar 2022 20:05), Max: 36.4 (22 Mar 2022 11:31)  T(F): 97.3 (22 Mar 2022 20:05), Max: 97.6 (22 Mar 2022 11:31)  HR: 86 (22 Mar 2022 20:05) (80 - 88)  BP: 124/70 (22 Mar 2022 20:05) (123/54 - 131/72)  BP(mean): --  RR: 16 (22 Mar 2022 20:05) (16 - 19)  SpO2: 100% (22 Mar 2022 20:05) (97% - 100%)    PHYSICAL EXAM:  GENERAL: NAD, well-groomed, well-developed  HEAD:  Atraumatic, Normocephalic  EYES: EOMI, PERRLA, conjunctiva and sclera clear  ENMT: No tonsillar erythema, exudates, or enlargement; Moist mucous membranes, Good dentition  NECK: Supple, No JVD  NERVOUS SYSTEM: AOX3, motor and sensation grossly intact in b/l UE and b/l LE  PSYCHIATRIC: Appropriate affect and mood  CHEST/LUNG: Clear to auscultation bilaterally; No rales, rhonchi, wheezing, or rubs  HEART: Regular rate and rhythm; No murmurs, rubs, or gallops. No LE edema  ABDOMEN: Soft, mild suprapubic tenderness, not distended  EXTREMITIES:  2+ Peripheral Pulses, No clubbing, cyanosis  SKIN: No rashes or lesions

## 2022-03-22 NOTE — ED PROVIDER NOTE - ATTENDING CONTRIBUTION TO CARE
Dr. Davila: 70 yo female with HTN, HLD, DM, in ED with intermittent SOB and abdominal pain for a few days.  Symptoms somewhat worse after eating.  No N/V or other complaints.  At time of my eval pt denying any active symptoms.  On exam pt well appearing, in NAD, heart RRR, lungs CTAB, abd NTND, extremities without swelling, strength 5/5 in all extremities and skin without rash.

## 2022-03-22 NOTE — ED PROVIDER NOTE - OBJECTIVE STATEMENT
71 Y F H/O HTN, HCL, presenting with intermittent SOB, abdominal pain. States recent SOB, dyspnea with exertion over the last several ays  consistent with previous episode in fall, at that time good EF ~65%, normal stress. In setting of intermittent abdominal pain (midepigastric, no exacerbating features, no dysuria) worse after PO intake.  Denies any chest pain, lower extremity pain, vision change, nausea, vomiting, able to tolerate PO.

## 2022-03-22 NOTE — ED ADULT NURSE NOTE - OBJECTIVE STATEMENT
pt received spot 11. pt A+Ox3, Divehi speaking, son at bedside for translation as per pt request. pt c/o intermittent abd pain x months. reports hx of "ulcer." also c/o SANDERS x months, worse over past 2 weeks. respirations even and unlabored. denies cp. placed on CM> NSR on CM noted. IVSL in place. labs sent. NAD noted. will monitor.

## 2022-03-22 NOTE — ED ADULT NURSE REASSESSMENT NOTE - NS ED NURSE REASSESS COMMENT FT1
09[facility]17/2020[facility].25-0.330650.614.5&nbsp; &nbsp; &nbsp; tnm REport given to DANAY Cooley  in ESSU1

## 2022-03-22 NOTE — ED PROVIDER NOTE - CLINICAL SUMMARY MEDICAL DECISION MAKING FREE TEXT BOX
71 Y F H/O presenting with dyspnea on exertion, abdominal pain (midepigastric), primary concern for cholecystitis (worse after PO intact, intermittent), vs. HF, common labs, RUQ, CTAP, symptomatic control, with IVF, re-assessment, dispo pending results.

## 2022-03-22 NOTE — ED PROVIDER NOTE - NSFOLLOWUPINSTRUCTIONS_ED_ALL_ED_FT
No signs of emergency medical condition on today's workup.  Presumptive diagnosis made, but further evaluation may be required by your primary care doctor or specialist for a definitive diagnosis.  Therefore, follow up as directed and if symptoms change/worsen or any emergency conditions, please return to the ER.   you were seen in the ED today for shortness of breath and abdominal pain.   you had blood work and imaging performed with no emergent findings.   please continue taking all of your prescribed home medications.   you can take tylenol 500-1000mg every 6 hours for discomfort if needed.   follow up with your cardiologist.   follow up with your primary care doctor.   return to the emergency department for any new or worsening symptoms.     You were seen today in the emergency room for abdominal pain. Although the testing done today indicates that your pain is not from an acute emergency, your pain could still represent a more serious problem. Most commonly, the pain you are having is likely not something serious and will resolve in a few days, however testing was done today based on the symptoms that you currently have; so if you develop new or worsening symptoms this could be a sign of a problem that was not tested for and means you should come back to the emergency room or see your doctor urgently. You need to follow up with your doctor in the next 48-72 hours. If you develop any new or worsening symptoms you need to return immediately to the emergency department. If you experience any of the following please come right back to the emergency room: severe nausea and vomiting with inability to tolerate eating, severe worsening of your pain, a new fever, new bleeding in stool or vomit, confusion, new numbness or weakness, passing out.       Shortness of breath (dyspnea) means you have trouble breathing and could indicate a medical problem. Causes include lung disease, heart disease, low amount of red blood cells (anemia), poor physical fitness, being overweight, smoking, etc. Your health care provider today may not be able to find a cause for your shortness of breath after your exam. In this case, it is important to have a follow-up exam with your primary care physician as instructed. If medicines were prescribed, take them as directed for the full length of time directed. Refrain from tobacco products.    SEEK IMMEDIATE MEDICAL CARE IF YOU HAVE ANY OF THE FOLLOWING SYMPTOMS: worsening shortness of breath, chest pain, back pain, abdominal pain, fever, coughing up blood, lightheadedness/dizziness.

## 2022-03-22 NOTE — ED PROVIDER NOTE - PROGRESS NOTE DETAILS
Marcial Pratt MD:  Patient with no tenderness, no dysuria, lab work non-concerning aside from lactate no source or other findings concerning for infectious workup, trop/bnp clear recent workup in october for similar dyspnea. UA clear asymptomatic, minimally improved S/P 1 L NS, will repeat 4->3.6-> ? patient comfortable with outpatient followup with tylenol for symptomatic control, re-assess likely DC. Joshua Martinez M.D. (PGY-1): pt signed out to me. evaluation for dyspnea on exertion. met acidosis w/ AG, ,likely elevated lactate, cnt s/p fluids, hx of high lactate. abn of endometrial canal and bladder wall thickening on ct a/p, incidental. cxr clear. hepatic steatosis on us. lactate remains high at 4.1. Joshua Martinez M.D. (PGY-1): pt signed out to me. evaluation for dyspnea on exertion. met acidosis w/ AG, ,likely elevated lactate, cnt s/p fluids, hx of high lactate. abn of endometrial canal and bladder wall thickening on ct a/p, incidental. cxr clear. hepatic steatosis on us. lactate remains high at 4.1.   unclear reason for high lactate, ongoing abdominal pain, elderly pt w/ difficulty communicating given language/cultural barriers. plan = admission for further work up of uptrending lactate in setting of abdominal pain.

## 2022-03-22 NOTE — H&P ADULT - PROBLEM SELECTOR PLAN 2
-patient describes SOB on exertion associated w/ chest pressure  -rarely gets chest pressure at rest  -history of similar symptoms with normal nuclear stress test in 10/21  -Echo 10/21: EF 68%, normal LV systolic function, mild diastolic dysfunction, mild TR  -EKG NSR  -troponin negative    Plan: - echo  -Patient may benefit from repeat stress test given recurrent symptoms, cardiology consult in AM

## 2022-03-22 NOTE — ED ADULT NURSE REASSESSMENT NOTE - NS ED NURSE REASSESS COMMENT FT1
Handoff receive pt in bed A*Ox3 magnesium infusing via 20g in left wrist, NAD, son at bedside will monitor pt

## 2022-03-22 NOTE — ED PROVIDER NOTE - NS ED ROS FT
GENERAL: No fever or chills  EYES: No change in vision  HEENT: No trouble swallowing or speaking  CARDIAC: No chest pain  PULMONARY: No cough, + SOB  GI: + abdominal pain, no nausea or no vomiting, no diarrhea or constipation  : No changes in urination, No dysuria  SKIN: No rashes  NEURO: No headache, no numbness  MSK: No joint pain  Otherwise as HPI or negative.

## 2022-03-22 NOTE — ED ADULT TRIAGE NOTE - CHIEF COMPLAINT QUOTE
Pt Thai speaking requesting son to translate.  C/O abd pain, right hand pain,  and chest pain x2 weeks.  Saw MD and was sent here.

## 2022-03-23 DIAGNOSIS — Z29.9 ENCOUNTER FOR PROPHYLACTIC MEASURES, UNSPECIFIED: ICD-10-CM

## 2022-03-23 DIAGNOSIS — R10.9 UNSPECIFIED ABDOMINAL PAIN: ICD-10-CM

## 2022-03-23 DIAGNOSIS — R79.89 OTHER SPECIFIED ABNORMAL FINDINGS OF BLOOD CHEMISTRY: ICD-10-CM

## 2022-03-23 DIAGNOSIS — E11.9 TYPE 2 DIABETES MELLITUS WITHOUT COMPLICATIONS: ICD-10-CM

## 2022-03-23 DIAGNOSIS — E78.5 HYPERLIPIDEMIA, UNSPECIFIED: ICD-10-CM

## 2022-03-23 DIAGNOSIS — R06.02 SHORTNESS OF BREATH: ICD-10-CM

## 2022-03-23 DIAGNOSIS — R74.01 ELEVATION OF LEVELS OF LIVER TRANSAMINASE LEVELS: ICD-10-CM

## 2022-03-23 DIAGNOSIS — I10 ESSENTIAL (PRIMARY) HYPERTENSION: ICD-10-CM

## 2022-03-23 LAB
A1C WITH ESTIMATED AVERAGE GLUCOSE RESULT: 8.5 % — HIGH (ref 4–5.6)
ALBUMIN SERPL ELPH-MCNC: 4.3 G/DL — SIGNIFICANT CHANGE UP (ref 3.3–5)
ALP SERPL-CCNC: 73 U/L — SIGNIFICANT CHANGE UP (ref 40–120)
ALT FLD-CCNC: 58 U/L — HIGH (ref 4–33)
ANION GAP SERPL CALC-SCNC: 14 MMOL/L — SIGNIFICANT CHANGE UP (ref 7–14)
AST SERPL-CCNC: 62 U/L — HIGH (ref 4–32)
BASE EXCESS BLDV CALC-SCNC: 2.5 MMOL/L — SIGNIFICANT CHANGE UP (ref -2–3)
BASOPHILS # BLD AUTO: 0.06 K/UL — SIGNIFICANT CHANGE UP (ref 0–0.2)
BASOPHILS NFR BLD AUTO: 0.6 % — SIGNIFICANT CHANGE UP (ref 0–2)
BILIRUB SERPL-MCNC: 0.5 MG/DL — SIGNIFICANT CHANGE UP (ref 0.2–1.2)
BLOOD GAS VENOUS COMPREHENSIVE RESULT: SIGNIFICANT CHANGE UP
BUN SERPL-MCNC: 6 MG/DL — LOW (ref 7–23)
CALCIUM SERPL-MCNC: 8.7 MG/DL — SIGNIFICANT CHANGE UP (ref 8.4–10.5)
CHLORIDE BLDV-SCNC: 101 MMOL/L — SIGNIFICANT CHANGE UP (ref 96–108)
CHLORIDE SERPL-SCNC: 103 MMOL/L — SIGNIFICANT CHANGE UP (ref 98–107)
CO2 BLDV-SCNC: 29.9 MMOL/L — HIGH (ref 22–26)
CO2 SERPL-SCNC: 24 MMOL/L — SIGNIFICANT CHANGE UP (ref 22–31)
CREAT SERPL-MCNC: 0.46 MG/DL — LOW (ref 0.5–1.3)
CULTURE RESULTS: SIGNIFICANT CHANGE UP
EGFR: 102 ML/MIN/1.73M2 — SIGNIFICANT CHANGE UP
EOSINOPHIL # BLD AUTO: 0.37 K/UL — SIGNIFICANT CHANGE UP (ref 0–0.5)
EOSINOPHIL NFR BLD AUTO: 3.9 % — SIGNIFICANT CHANGE UP (ref 0–6)
ESTIMATED AVERAGE GLUCOSE: 197 — SIGNIFICANT CHANGE UP
GAS PNL BLDV: 139 MMOL/L — SIGNIFICANT CHANGE UP (ref 136–145)
GLUCOSE BLDC GLUCOMTR-MCNC: 175 MG/DL — HIGH (ref 70–99)
GLUCOSE BLDC GLUCOMTR-MCNC: 195 MG/DL — HIGH (ref 70–99)
GLUCOSE BLDC GLUCOMTR-MCNC: 244 MG/DL — HIGH (ref 70–99)
GLUCOSE BLDC GLUCOMTR-MCNC: 301 MG/DL — HIGH (ref 70–99)
GLUCOSE BLDC GLUCOMTR-MCNC: 337 MG/DL — HIGH (ref 70–99)
GLUCOSE BLDV-MCNC: 115 MG/DL — HIGH (ref 70–99)
GLUCOSE SERPL-MCNC: 122 MG/DL — HIGH (ref 70–99)
HCO3 BLDV-SCNC: 28 MMOL/L — SIGNIFICANT CHANGE UP (ref 22–29)
HCT VFR BLD CALC: 36.6 % — SIGNIFICANT CHANGE UP (ref 34.5–45)
HCT VFR BLDA CALC: 38 % — SIGNIFICANT CHANGE UP (ref 34.5–46.5)
HGB BLD CALC-MCNC: 12.5 G/DL — SIGNIFICANT CHANGE UP (ref 11.5–15.5)
HGB BLD-MCNC: 11.9 G/DL — SIGNIFICANT CHANGE UP (ref 11.5–15.5)
IANC: 5.09 K/UL — SIGNIFICANT CHANGE UP (ref 1.5–8.5)
IMM GRANULOCYTES NFR BLD AUTO: 0.2 % — SIGNIFICANT CHANGE UP (ref 0–1.5)
LACTATE BLDV-MCNC: 3 MMOL/L — HIGH (ref 0.5–2)
LACTATE SERPL-SCNC: 2.9 MMOL/L — HIGH (ref 0.5–2)
LYMPHOCYTES # BLD AUTO: 3.27 K/UL — SIGNIFICANT CHANGE UP (ref 1–3.3)
LYMPHOCYTES # BLD AUTO: 34.7 % — SIGNIFICANT CHANGE UP (ref 13–44)
MAGNESIUM SERPL-MCNC: 1.8 MG/DL — SIGNIFICANT CHANGE UP (ref 1.6–2.6)
MCHC RBC-ENTMCNC: 24.2 PG — LOW (ref 27–34)
MCHC RBC-ENTMCNC: 32.5 GM/DL — SIGNIFICANT CHANGE UP (ref 32–36)
MCV RBC AUTO: 74.5 FL — LOW (ref 80–100)
MONOCYTES # BLD AUTO: 0.61 K/UL — SIGNIFICANT CHANGE UP (ref 0–0.9)
MONOCYTES NFR BLD AUTO: 6.5 % — SIGNIFICANT CHANGE UP (ref 2–14)
NEUTROPHILS # BLD AUTO: 5.09 K/UL — SIGNIFICANT CHANGE UP (ref 1.8–7.4)
NEUTROPHILS NFR BLD AUTO: 54.1 % — SIGNIFICANT CHANGE UP (ref 43–77)
NRBC # BLD: 0 /100 WBCS — SIGNIFICANT CHANGE UP
NRBC # FLD: 0 K/UL — SIGNIFICANT CHANGE UP
PCO2 BLDV: 48 MMHG — HIGH (ref 39–42)
PH BLDV: 7.38 — SIGNIFICANT CHANGE UP (ref 7.32–7.43)
PHOSPHATE SERPL-MCNC: 3.2 MG/DL — SIGNIFICANT CHANGE UP (ref 2.5–4.5)
PLATELET # BLD AUTO: 327 K/UL — SIGNIFICANT CHANGE UP (ref 150–400)
PO2 BLDV: 53 MMHG — SIGNIFICANT CHANGE UP
POTASSIUM BLDV-SCNC: 3.7 MMOL/L — SIGNIFICANT CHANGE UP (ref 3.5–5.1)
POTASSIUM SERPL-MCNC: 3.8 MMOL/L — SIGNIFICANT CHANGE UP (ref 3.5–5.3)
POTASSIUM SERPL-SCNC: 3.8 MMOL/L — SIGNIFICANT CHANGE UP (ref 3.5–5.3)
PROT SERPL-MCNC: 7.3 G/DL — SIGNIFICANT CHANGE UP (ref 6–8.3)
RBC # BLD: 4.91 M/UL — SIGNIFICANT CHANGE UP (ref 3.8–5.2)
RBC # FLD: 15.9 % — HIGH (ref 10.3–14.5)
SAO2 % BLDV: 81.3 % — SIGNIFICANT CHANGE UP
SODIUM SERPL-SCNC: 141 MMOL/L — SIGNIFICANT CHANGE UP (ref 135–145)
SPECIMEN SOURCE: SIGNIFICANT CHANGE UP
WBC # BLD: 9.42 K/UL — SIGNIFICANT CHANGE UP (ref 3.8–10.5)
WBC # FLD AUTO: 9.42 K/UL — SIGNIFICANT CHANGE UP (ref 3.8–10.5)

## 2022-03-23 PROCEDURE — 76856 US EXAM PELVIC COMPLETE: CPT | Mod: 26

## 2022-03-23 PROCEDURE — 12345: CPT | Mod: NC

## 2022-03-23 PROCEDURE — 71250 CT THORAX DX C-: CPT | Mod: 26

## 2022-03-23 RX ORDER — INSULIN GLARGINE 100 [IU]/ML
16 INJECTION, SOLUTION SUBCUTANEOUS AT BEDTIME
Refills: 0 | Status: DISCONTINUED | OUTPATIENT
Start: 2022-03-23 | End: 2022-03-23

## 2022-03-23 RX ORDER — METOPROLOL TARTRATE 50 MG
1 TABLET ORAL
Qty: 0 | Refills: 0 | DISCHARGE

## 2022-03-23 RX ORDER — INSULIN LISPRO 100/ML
VIAL (ML) SUBCUTANEOUS AT BEDTIME
Refills: 0 | Status: DISCONTINUED | OUTPATIENT
Start: 2022-03-23 | End: 2022-03-25

## 2022-03-23 RX ORDER — SODIUM CHLORIDE 9 MG/ML
1000 INJECTION, SOLUTION INTRAVENOUS
Refills: 0 | Status: DISCONTINUED | OUTPATIENT
Start: 2022-03-23 | End: 2022-03-23

## 2022-03-23 RX ORDER — PREGABALIN 225 MG/1
1000 CAPSULE ORAL DAILY
Refills: 0 | Status: DISCONTINUED | OUTPATIENT
Start: 2022-03-23 | End: 2022-03-27

## 2022-03-23 RX ORDER — SODIUM CHLORIDE 9 MG/ML
1000 INJECTION INTRAMUSCULAR; INTRAVENOUS; SUBCUTANEOUS
Refills: 0 | Status: DISCONTINUED | OUTPATIENT
Start: 2022-03-23 | End: 2022-03-24

## 2022-03-23 RX ORDER — ACETAMINOPHEN 500 MG
650 TABLET ORAL EVERY 6 HOURS
Refills: 0 | Status: DISCONTINUED | OUTPATIENT
Start: 2022-03-23 | End: 2022-03-27

## 2022-03-23 RX ORDER — GLUCAGON INJECTION, SOLUTION 0.5 MG/.1ML
1 INJECTION, SOLUTION SUBCUTANEOUS ONCE
Refills: 0 | Status: DISCONTINUED | OUTPATIENT
Start: 2022-03-23 | End: 2022-03-23

## 2022-03-23 RX ORDER — DEXTROSE 50 % IN WATER 50 %
12.5 SYRINGE (ML) INTRAVENOUS ONCE
Refills: 0 | Status: DISCONTINUED | OUTPATIENT
Start: 2022-03-23 | End: 2022-03-27

## 2022-03-23 RX ORDER — INSULIN GLARGINE 100 [IU]/ML
14 INJECTION, SOLUTION SUBCUTANEOUS AT BEDTIME
Refills: 0 | Status: DISCONTINUED | OUTPATIENT
Start: 2022-03-23 | End: 2022-03-23

## 2022-03-23 RX ORDER — INSULIN GLARGINE 100 [IU]/ML
18 INJECTION, SOLUTION SUBCUTANEOUS AT BEDTIME
Refills: 0 | Status: DISCONTINUED | OUTPATIENT
Start: 2022-03-23 | End: 2022-03-24

## 2022-03-23 RX ORDER — INSULIN LISPRO 100/ML
VIAL (ML) SUBCUTANEOUS
Refills: 0 | Status: DISCONTINUED | OUTPATIENT
Start: 2022-03-23 | End: 2022-03-25

## 2022-03-23 RX ORDER — AMLODIPINE BESYLATE 2.5 MG/1
10 TABLET ORAL DAILY
Refills: 0 | Status: DISCONTINUED | OUTPATIENT
Start: 2022-03-23 | End: 2022-03-27

## 2022-03-23 RX ORDER — DEXTROSE 50 % IN WATER 50 %
25 SYRINGE (ML) INTRAVENOUS ONCE
Refills: 0 | Status: DISCONTINUED | OUTPATIENT
Start: 2022-03-23 | End: 2022-03-27

## 2022-03-23 RX ORDER — INSULIN LISPRO 100/ML
3 VIAL (ML) SUBCUTANEOUS
Refills: 0 | Status: DISCONTINUED | OUTPATIENT
Start: 2022-03-23 | End: 2022-03-24

## 2022-03-23 RX ORDER — INSULIN LISPRO 100/ML
2 VIAL (ML) SUBCUTANEOUS
Refills: 0 | Status: DISCONTINUED | OUTPATIENT
Start: 2022-03-23 | End: 2022-03-23

## 2022-03-23 RX ORDER — ASPIRIN/CALCIUM CARB/MAGNESIUM 324 MG
81 TABLET ORAL DAILY
Refills: 0 | Status: DISCONTINUED | OUTPATIENT
Start: 2022-03-23 | End: 2022-03-27

## 2022-03-23 RX ORDER — LANOLIN ALCOHOL/MO/W.PET/CERES
3 CREAM (GRAM) TOPICAL AT BEDTIME
Refills: 0 | Status: DISCONTINUED | OUTPATIENT
Start: 2022-03-23 | End: 2022-03-27

## 2022-03-23 RX ORDER — ATORVASTATIN CALCIUM 80 MG/1
20 TABLET, FILM COATED ORAL AT BEDTIME
Refills: 0 | Status: DISCONTINUED | OUTPATIENT
Start: 2022-03-23 | End: 2022-03-27

## 2022-03-23 RX ORDER — DEXTROSE 50 % IN WATER 50 %
15 SYRINGE (ML) INTRAVENOUS ONCE
Refills: 0 | Status: DISCONTINUED | OUTPATIENT
Start: 2022-03-23 | End: 2022-03-27

## 2022-03-23 RX ORDER — INSULIN GLARGINE 100 [IU]/ML
14 INJECTION, SOLUTION SUBCUTANEOUS ONCE
Refills: 0 | Status: COMPLETED | OUTPATIENT
Start: 2022-03-23 | End: 2022-03-23

## 2022-03-23 RX ORDER — PANTOPRAZOLE SODIUM 20 MG/1
40 TABLET, DELAYED RELEASE ORAL
Refills: 0 | Status: DISCONTINUED | OUTPATIENT
Start: 2022-03-23 | End: 2022-03-27

## 2022-03-23 RX ADMIN — ATORVASTATIN CALCIUM 20 MILLIGRAM(S): 80 TABLET, FILM COATED ORAL at 22:05

## 2022-03-23 RX ADMIN — AMLODIPINE BESYLATE 10 MILLIGRAM(S): 2.5 TABLET ORAL at 05:42

## 2022-03-23 RX ADMIN — SODIUM CHLORIDE 75 MILLILITER(S): 9 INJECTION INTRAMUSCULAR; INTRAVENOUS; SUBCUTANEOUS at 17:29

## 2022-03-23 RX ADMIN — Medication 2 UNIT(S): at 17:38

## 2022-03-23 RX ADMIN — Medication 4: at 17:38

## 2022-03-23 RX ADMIN — PANTOPRAZOLE SODIUM 40 MILLIGRAM(S): 20 TABLET, DELAYED RELEASE ORAL at 05:42

## 2022-03-23 RX ADMIN — Medication 1: at 09:49

## 2022-03-23 RX ADMIN — Medication 81 MILLIGRAM(S): at 11:49

## 2022-03-23 RX ADMIN — INSULIN GLARGINE 14 UNIT(S): 100 INJECTION, SOLUTION SUBCUTANEOUS at 01:11

## 2022-03-23 RX ADMIN — INSULIN GLARGINE 18 UNIT(S): 100 INJECTION, SOLUTION SUBCUTANEOUS at 22:05

## 2022-03-23 RX ADMIN — SODIUM CHLORIDE 75 MILLILITER(S): 9 INJECTION, SOLUTION INTRAVENOUS at 04:12

## 2022-03-23 RX ADMIN — Medication 4: at 12:27

## 2022-03-23 RX ADMIN — SODIUM CHLORIDE 75 MILLILITER(S): 9 INJECTION, SOLUTION INTRAVENOUS at 00:57

## 2022-03-23 RX ADMIN — PREGABALIN 1000 MICROGRAM(S): 225 CAPSULE ORAL at 11:50

## 2022-03-23 NOTE — PATIENT PROFILE ADULT - FUNCTIONAL ASSESSMENT - DAILY ACTIVITY SCORE.
----- Message from Anthony Scott MD sent at 1/31/2020  8:30 AM EST -----  The laboratory testing results are normal 
20

## 2022-03-23 NOTE — PATIENT PROFILE ADULT - FALL HARM RISK - HARM RISK INTERVENTIONS

## 2022-03-23 NOTE — PATIENT PROFILE ADULT - FUNCTIONAL ASSESSMENT - BASIC MOBILITY 3.
Emergency Department    [address] [facility] [address]    Phone:  [phone]    Fax:  [phone]                                       Juan Carlos Ricci   MRN: [id number]    Department:   Emergency Department   Date of Visit:  1/8/2017           Patient Information     Date Of Birth          2002        Your diagnoses for this visit were:     Concussion, without loss of consciousness, initial encounter        You were seen by Mallory Turner, TREY LITTLE.      Follow-up Information     Follow up with Leonardo Tuttle In 1 week.    Specialty:  Pediatrics    Contact information:    [facility]  [address] [place] [address] [address]  [facility] [id number] [phone]        Discharge References/Attachments     CONCUSSION, NO WAKE-UP (ENGLISH)      24 Hour Appointment Hotline       To make an appointment at any [facility], call [phone] ([phone]). If you don't have a family doctor or clinic, we will help you find one. [facility] clinics are conveniently located to serve the needs of you and your family.             Review of your medicines      Notice     You have not been prescribed any medications.            Orders Needing Specimen Collection     None      Pending Results     No orders found from 1/7/2017 to 1/9/2017.            Pending Culture Results     No orders found from 1/7/2017 to 1/9/2017.             Test Results from your hospital stay            Thank you for choosing [facility]       Thank you for choosing [facility] for your care. Our goal is always to provide you with excellent care. Hearing back from our patients is one way we can continue to improve our services. Please take a few minutes to complete the written survey that you may receive in the mail after you visit with us. Thank you!        [employer] Information     [employer] lets you send messages to your doctor, view your test results, renew your prescriptions, schedule appointments and more. To sign up, go to  [email or web address], contact your [facility] clinic or call [phone] during business hours.            Care EveryWhere ID     This is your Care EveryWhere ID. This could be used by other organizations to access your [facility] medical records  [id number]        After Visit Summary       This is your record. Keep this with you and show to your community pharmacist(s) and doctor(s) at your next visit.                   3 = A little assistance

## 2022-03-23 NOTE — PHARMACOTHERAPY INTERVENTION NOTE - COMMENTS
Medication history is incomplete. Medication list confirmed with outpatient pharmacy, need to verify with family (Left message for son to call back).  OMR to be updated accordingly when get in touch w/family.   Of Note:  - Per pharmacy: Lantus filled this month, 30 units SQ once daily  - Pharmacy also filled Lopressor 50mg Q8H and Glipizide 10mg TID (Need to confirm with family if patient is taking).

## 2022-03-23 NOTE — PROGRESS NOTE ADULT - SUBJECTIVE AND OBJECTIVE BOX
Gill Mansfield MD  [facility] Division of Hospital Medicine  Pager [phone] (M-F 8AM-5PM)  Other Times: [phone]    Patient is a 71y old  Female who presents with a chief complaint of SOB on exertion/abdominal pain (22 Mar 2022 23:50)    SUBJECTIVE / OVERNIGHT EVENTS: c/o SANDERS    MEDICATIONS  (STANDING):  amLODIPine   Tablet 10 milliGRAM(s) Oral daily  aspirin  chewable 81 milliGRAM(s) Oral daily  atorvastatin 20 milliGRAM(s) Oral at bedtime  cyanocobalamin 1000 MICROGram(s) Oral daily  dextrose 40% Gel 15 Gram(s) Oral once  dextrose 50% Injectable 25 Gram(s) IV Push once  dextrose 50% Injectable 12.5 Gram(s) IV Push once  dextrose 50% Injectable 25 Gram(s) IV Push once  insulin glargine Injectable (LANTUS) 16 Unit(s) SubCutaneous at bedtime  insulin lispro (ADMELOG) corrective regimen sliding scale   SubCutaneous three times a day before meals  insulin lispro (ADMELOG) corrective regimen sliding scale   SubCutaneous at bedtime  insulin lispro Injectable (ADMELOG) 2 Unit(s) SubCutaneous three times a day before meals  pantoprazole    Tablet 40 milliGRAM(s) Oral before breakfast  sodium chloride 0.9%. 1000 milliLiter(s) (75 mL/Hr) IV Continuous <Continuous>    MEDICATIONS  (PRN):  acetaminophen     Tablet .. 650 milliGRAM(s) Oral every 6 hours PRN Temp greater or equal to 38C (100.4F), Mild Pain (1 - 3)  melatonin 3 milliGRAM(s) Oral at bedtime PRN Insomnia      PHYSICAL EXAM:  Vital Signs Last 24 Hrs  T(C): 36.6 (23 Mar 2022 12:23), Max: 36.7 (23 Mar 2022 10:25)  T(F): 97.9 (23 Mar 2022 12:23), Max: 98.1 (23 Mar 2022 10:25)  HR: 82 (23 Mar 2022 12:23) (81 - 91)  BP: 131/68 (23 Mar 2022 12:23) (124/70 - 146/85)  RR: 16 (23 Mar 2022 12:23) (16 - 20)  SpO2: 100% (23 Mar 2022 12:23) (97% - 100%)    CONSTITUTIONAL: NAD, well-developed, well-groomed  RESPIRATORY: Normal respiratory effort; lungs are clear to auscultation bilaterally  CARDIOVASCULAR: Regular rate and rhythm, normal S1 and S2, no murmur/rub/gallop; No lower extremity edema  GASTROINTESTINAL: Nontender to palpation, normoactive bowel sounds, no rebound/guarding; No hepatosplenomegaly  MUSCULOSKELETAL:  no clubbing or cyanosis of digits; no joint swelling or tenderness to palpation  NEUROLOGY: non-focal; no gross sensory deficits   PSYCH: A+O to person, place, and time; affect appropriate  SKIN: No rashes; warm     LABS:                        11.9   9.42  )-----------( 327      ( 23 Mar 2022 06:37 )             36.6     03    141  |  103  |  6<L>  ----------------------------<  122<H>  3.8   |  24  |  0.46<L>    Ca    8.7      23 Mar 2022 06:37  Phos  3.2       Mg     1.80         TPro  7.3  /  Alb  4.3  /  TBili  0.5  /  DBili  x   /  AST  62<H>  /  ALT  58<H>  /  AlkPhos  73        CARDIAC MARKERS ( 22 Mar 2022 21:14 )  x     / x     / x     / x     / 2.0 ng/mL      Urinalysis Basic - ( 22 Mar 2022 17:42 )    Color: Colorless / Appearance: Clear / S.036 / pH: x  Gluc: x / Ketone: Negative  / Bili: Negative / Urobili: <2 mg/dL   Blood: x / Protein: Negative / Nitrite: Negative   Leuk Esterase: Negative / RBC: x / WBC x   Sq Epi: x / Non Sq Epi: x / Bacteria: x          RADIOLOGY & ADDITIONAL TESTS:  Results Reviewed:   Imaging Personally Reviewed:  Electrocardiogram Personally Reviewed:    COORDINATION OF CARE:  Care Discussed with Consultants/Other Providers [Y/N]:  Prior or Outpatient Records Reviewed [Y/N]:

## 2022-03-23 NOTE — PATIENT PROFILE ADULT - NSPROMEDSBROUGHTTOHOSP_GEN_A_NUR
Attending Assessment: 14 mo F with fall from parents' bed (about 3 ft) onto thin carpet with tile underneath, no loc and no vomiting. Pt was not acting herself after fall so mom casme in for eval, pt at baseline with normal exam in th eEd other than small contusion to R frontal forehead, pt obsevred 2 hours in th eEd(4 hours from fall) and with no issues and tolerated po without difficulty, will d.c home with supportive care, Phi Morales MD no

## 2022-03-24 DIAGNOSIS — R91.8 OTHER NONSPECIFIC ABNORMAL FINDING OF LUNG FIELD: ICD-10-CM

## 2022-03-24 LAB
ALBUMIN SERPL ELPH-MCNC: 4.1 G/DL — SIGNIFICANT CHANGE UP (ref 3.3–5)
ALP SERPL-CCNC: 71 U/L — SIGNIFICANT CHANGE UP (ref 40–120)
ALT FLD-CCNC: 59 U/L — HIGH (ref 4–33)
ANION GAP SERPL CALC-SCNC: 14 MMOL/L — SIGNIFICANT CHANGE UP (ref 7–14)
AST SERPL-CCNC: 59 U/L — HIGH (ref 4–32)
BILIRUB SERPL-MCNC: 0.5 MG/DL — SIGNIFICANT CHANGE UP (ref 0.2–1.2)
BUN SERPL-MCNC: 8 MG/DL — SIGNIFICANT CHANGE UP (ref 7–23)
CALCIUM SERPL-MCNC: 8.6 MG/DL — SIGNIFICANT CHANGE UP (ref 8.4–10.5)
CHLORIDE SERPL-SCNC: 101 MMOL/L — SIGNIFICANT CHANGE UP (ref 98–107)
CO2 SERPL-SCNC: 23 MMOL/L — SIGNIFICANT CHANGE UP (ref 22–31)
CREAT SERPL-MCNC: 0.41 MG/DL — LOW (ref 0.5–1.3)
CULTURE RESULTS: SIGNIFICANT CHANGE UP
EGFR: 104 ML/MIN/1.73M2 — SIGNIFICANT CHANGE UP
GLUCOSE BLDC GLUCOMTR-MCNC: 179 MG/DL — HIGH (ref 70–99)
GLUCOSE BLDC GLUCOMTR-MCNC: 249 MG/DL — HIGH (ref 70–99)
GLUCOSE BLDC GLUCOMTR-MCNC: 258 MG/DL — HIGH (ref 70–99)
GLUCOSE BLDC GLUCOMTR-MCNC: 365 MG/DL — HIGH (ref 70–99)
GLUCOSE BLDC GLUCOMTR-MCNC: 456 MG/DL — CRITICAL HIGH (ref 70–99)
GLUCOSE SERPL-MCNC: 213 MG/DL — HIGH (ref 70–99)
LACTATE SERPL-SCNC: 2.5 MMOL/L — HIGH (ref 0.5–2)
MAGNESIUM SERPL-MCNC: 1.7 MG/DL — SIGNIFICANT CHANGE UP (ref 1.6–2.6)
PHOSPHATE SERPL-MCNC: 2.9 MG/DL — SIGNIFICANT CHANGE UP (ref 2.5–4.5)
POTASSIUM SERPL-MCNC: 3.5 MMOL/L — SIGNIFICANT CHANGE UP (ref 3.5–5.3)
POTASSIUM SERPL-SCNC: 3.5 MMOL/L — SIGNIFICANT CHANGE UP (ref 3.5–5.3)
PROT SERPL-MCNC: 6.7 G/DL — SIGNIFICANT CHANGE UP (ref 6–8.3)
SODIUM SERPL-SCNC: 138 MMOL/L — SIGNIFICANT CHANGE UP (ref 135–145)
SPECIMEN SOURCE: SIGNIFICANT CHANGE UP

## 2022-03-24 PROCEDURE — 99233 SBSQ HOSP IP/OBS HIGH 50: CPT

## 2022-03-24 PROCEDURE — 99255 IP/OBS CONSLTJ NEW/EST HI 80: CPT

## 2022-03-24 RX ORDER — INSULIN LISPRO 100/ML
8 VIAL (ML) SUBCUTANEOUS
Refills: 0 | Status: DISCONTINUED | OUTPATIENT
Start: 2022-03-24 | End: 2022-03-26

## 2022-03-24 RX ORDER — INSULIN GLARGINE 100 [IU]/ML
24 INJECTION, SOLUTION SUBCUTANEOUS AT BEDTIME
Refills: 0 | Status: DISCONTINUED | OUTPATIENT
Start: 2022-03-24 | End: 2022-03-27

## 2022-03-24 RX ORDER — INSULIN LISPRO 100/ML
4 VIAL (ML) SUBCUTANEOUS
Refills: 0 | Status: DISCONTINUED | OUTPATIENT
Start: 2022-03-24 | End: 2022-03-24

## 2022-03-24 RX ORDER — INSULIN GLARGINE 100 [IU]/ML
20 INJECTION, SOLUTION SUBCUTANEOUS AT BEDTIME
Refills: 0 | Status: DISCONTINUED | OUTPATIENT
Start: 2022-03-24 | End: 2022-03-24

## 2022-03-24 RX ADMIN — Medication 8 UNIT(S): at 17:34

## 2022-03-24 RX ADMIN — INSULIN GLARGINE 24 UNIT(S): 100 INJECTION, SOLUTION SUBCUTANEOUS at 21:55

## 2022-03-24 RX ADMIN — Medication 2: at 17:33

## 2022-03-24 RX ADMIN — Medication 50 MILLIGRAM(S): at 21:30

## 2022-03-24 RX ADMIN — PREGABALIN 1000 MICROGRAM(S): 225 CAPSULE ORAL at 11:43

## 2022-03-24 RX ADMIN — Medication 3 UNIT(S): at 08:40

## 2022-03-24 RX ADMIN — Medication 81 MILLIGRAM(S): at 11:43

## 2022-03-24 RX ADMIN — Medication 1: at 21:55

## 2022-03-24 RX ADMIN — Medication 5: at 12:25

## 2022-03-24 RX ADMIN — Medication 1: at 08:33

## 2022-03-24 RX ADMIN — PANTOPRAZOLE SODIUM 40 MILLIGRAM(S): 20 TABLET, DELAYED RELEASE ORAL at 08:40

## 2022-03-24 RX ADMIN — ATORVASTATIN CALCIUM 20 MILLIGRAM(S): 80 TABLET, FILM COATED ORAL at 21:30

## 2022-03-24 RX ADMIN — AMLODIPINE BESYLATE 10 MILLIGRAM(S): 2.5 TABLET ORAL at 05:41

## 2022-03-24 NOTE — CONSULT NOTE ADULT - SUBJECTIVE AND OBJECTIVE BOX
Reason For Consult: DM    HPI:  70 y/o F w/ PMH T2DM, HTN, HLD, gastric ulcer (per patient) presents with SOB on exertion and chest pressure along with abdominal pain. The SOB on exertion has been present for about 1.5 months. Patient's abdominal pain has been going on for "months" and she states it generally comes and goes randomly, however the pain has worsened over the past few weeks.  plan for cardiac cath   Endocrine called for diabetes assistance please note that patient is Persian speaking.  Spoke with the patient with [employer] interpre[employer], Persian ID number [id number].  Patient states that she has a 10-year history of diabetes currently follows with a primary care provider currently is on Januvia Metformin as well is once a day insulin which she could not give me the name.  per the med rec which is incomplete, pt is on Lantus 24 units at night   A1c found to be 8.5.  Patient states that she checks her sugar once a day in the morning.  She has a diet that is high in rice and roti and carbohydrates.  Does not report any recent ophthalmology follow-up.  Patient does not report any known micro or macrovascular complications.  Patient's blood glucose inpatient varying from the 100s to 300s, please note patient is eating at home cooked rice as well as oranges in between meals I had a long discussion with the patient to try to educate her on glucose timings and meals most recent blood glucose 360s.  Plan for patient to start prednisone tonight prior to her catheterization      PAST MEDICAL & SURGICAL HISTORY:  HTN (hypertension)    DM (diabetes mellitus)    HLD (hyperlipidemia)    No significant past surgical history        FAMILY HISTORY:  Family history of hypertension  Father        Social History:  lives with family     Outpatient Medications:  	No Known Drug Allergies:   	Seafood: Food, Hives, Rash    Home Medications:   * Incomplete Medication History as of 23-Mar-2022 00:30 documented in Structured Notes  · 	albuterol 90 mcg/inh inhalation aerosol: Last Dose Taken:  , 2 puff(s) inhaled every 6 hours as needed for shortness of breath and/or wheezing   · 	Lipitor 20 mg oral tablet: Last Dose Taken:  , 1 tab(s) orally once a day (at bedtime)  · 	Lantus Solostar Pen 100 units/mL subcutaneous solution: Last Dose Taken:  , 24 unit(s) subcutaneous once a day at 6pm   · 	pantoprazole 40 mg oral delayed release tablet: Last Dose Taken:  , 1 tab(s) orally once a day (before a meal)  · 	metFORMIN 1000 mg oral tablet: Last Dose Taken:  , 1 tab(s) orally 2 times a day   · 	amLODIPine 10 mg oral tablet: Last Dose Taken:  , 1 tab(s) orally once a day  · 	Vitamin B12 1000 mcg oral tablet: Last Dose Taken:  , 1 tab(s) orally once a day  · 	aspirin 81 mg oral tablet: Last Dose Taken:  , 1 tab(s) orally once a day  · 	Metoprolol Tartrate 50 mg oral tablet: Last Dose Taken:  , 1 tab(s) orally 2 times a day  · 	Januvia 100 mg oral tablet: Last Dose Taken:  , 1 tab(s) orally once a day        MEDICATIONS  (STANDING):  amLODIPine   Tablet 10 milliGRAM(s) Oral daily  aspirin  chewable 81 milliGRAM(s) Oral daily  atorvastatin 20 milliGRAM(s) Oral at bedtime  cyanocobalamin 1000 MICROGram(s) Oral daily  dextrose 40% Gel 15 Gram(s) Oral once  dextrose 50% Injectable 25 Gram(s) IV Push once  dextrose 50% Injectable 12.5 Gram(s) IV Push once  dextrose 50% Injectable 25 Gram(s) IV Push once  insulin glargine Injectable (LANTUS) 24 Unit(s) SubCutaneous at bedtime  insulin lispro (ADMELOG) corrective regimen sliding scale   SubCutaneous three times a day before meals  insulin lispro (ADMELOG) corrective regimen sliding scale   SubCutaneous at bedtime  insulin lispro Injectable (ADMELOG) 4 Unit(s) SubCutaneous three times a day before meals  pantoprazole    Tablet 40 milliGRAM(s) Oral before breakfast  predniSONE   Tablet 50 milliGRAM(s) Oral once    MEDICATIONS  (PRN):  acetaminophen     Tablet .. 650 milliGRAM(s) Oral every 6 hours PRN Temp greater or equal to 38C (100.4F), Mild Pain (1 - 3)  melatonin 3 milliGRAM(s) Oral at bedtime PRN Insomnia      Allergies    No Known Drug Allergies  Seafood (Hives; Rash)    Intolerances      Review of Systems:  Constitutional: No fever  Eyes: No blurry vision  Neuro: No tremors  HEENT: No pain  Cardiovascular: No chest pain, palpitations  Respiratory: No SOB, no cough  GI: No nausea, vomiting, abdominal pain  : No dysuria  Skin: no rash  Psych: no depression  Endocrine: no polyuria, polydipsia  Hem/lymph: no swelling  Osteoporosis: no fractures    ALL OTHER SYSTEMS REVIEWED AND NEGATIVE    UNABLE TO OBTAIN    PHYSICAL EXAM:  VITALS: T(C): 36.2 (03-24-22 @ 12:00)  T(F): 97.2 (03-24-22 @ 12:00), Max: 98.1 (03-23-22 @ 22:02)  HR: 107 (03-24-22 @ 12:00) (77 - 107)  BP: 150/73 (03-24-22 @ 12:00) (145/83 - 150/73)  RR:  (16 - 18)  SpO2:  (97% - 100%)  Wt(kg): --  GENERAL: NAD, well-groomed, well-developed  RESPIRATORY: Clear to auscultation bilaterally; No rales, rhonchi, wheezing, or rubs  CARDIOVASCULAR: Regular rate and rhythm; No murmurs; no peripheral edema  GI: Soft, nontender, non distended, normal bowel sounds  SKIN: Dry, intact, No rashes or lesions  MUSCULOSKELETAL: Full range of motion, normal strength  NEURO: sensation intact, extraocular movements intact, no tremor, normal reflexes  PSYCH: Alert and oriented x 3, normal affect, normal mood      POCT Blood Glucose.: 365 mg/dL (03-24-22 @ 12:16)  POCT Blood Glucose.: 456 mg/dL (03-24-22 @ 12:14)  POCT Blood Glucose.: 179 mg/dL (03-24-22 @ 08:22)  POCT Blood Glucose.: 244 mg/dL (03-23-22 @ 22:05)  POCT Blood Glucose.: 337 mg/dL (03-23-22 @ 17:32)  POCT Blood Glucose.: 301 mg/dL (03-23-22 @ 12:22)  POCT Blood Glucose.: 175 mg/dL (03-23-22 @ 09:46)  POCT Blood Glucose.: 195 mg/dL (03-23-22 @ 01:09)  POCT Blood Glucose.: 179 mg/dL (03-22-22 @ 23:33)                            11.9   9.42  )-----------( 327      ( 23 Mar 2022 06:37 )             36.6       03-24    138  |  101  |  8   ----------------------------<  213<H>  3.5   |  23  |  0.41<L>    EGFR if : x   EGFR if non : x     Ca    8.6      03-24  Mg     1.70     03-24  Phos  2.9     03-24    TPro  6.7  /  Alb  4.1  /  TBili  0.5  /  DBili  x   /  AST  59<H>  /  ALT  59<H>  /  AlkPhos  71  03-24      Thyroid Function Tests:              Radiology:

## 2022-03-24 NOTE — PROGRESS NOTE ADULT - SUBJECTIVE AND OBJECTIVE BOX
Gill Mansfield MD  [facility] Division of Hospital Medicine  Pager [phone] (M-F 8AM-5PM)  Other Times: [phone]    Patient is a 71y old  Female who presents with a chief complaint of SOB on exertion/abdominal pain (24 Mar 2022 08:35)    SUBJECTIVE / OVERNIGHT EVENTS: patient says SOB getting better    MEDICATIONS  (STANDING):  amLODIPine   Tablet 10 milliGRAM(s) Oral daily  aspirin  chewable 81 milliGRAM(s) Oral daily  atorvastatin 20 milliGRAM(s) Oral at bedtime  cyanocobalamin 1000 MICROGram(s) Oral daily  dextrose 40% Gel 15 Gram(s) Oral once  dextrose 50% Injectable 25 Gram(s) IV Push once  dextrose 50% Injectable 12.5 Gram(s) IV Push once  dextrose 50% Injectable 25 Gram(s) IV Push once  insulin glargine Injectable (LANTUS) 24 Unit(s) SubCutaneous at bedtime  insulin lispro (ADMELOG) corrective regimen sliding scale   SubCutaneous three times a day before meals  insulin lispro (ADMELOG) corrective regimen sliding scale   SubCutaneous at bedtime  insulin lispro Injectable (ADMELOG) 4 Unit(s) SubCutaneous three times a day before meals  pantoprazole    Tablet 40 milliGRAM(s) Oral before breakfast  predniSONE   Tablet 50 milliGRAM(s) Oral once    MEDICATIONS  (PRN):  acetaminophen     Tablet .. 650 milliGRAM(s) Oral every 6 hours PRN Temp greater or equal to 38C (100.4F), Mild Pain (1 - 3)  melatonin 3 milliGRAM(s) Oral at bedtime PRN Insomnia      PHYSICAL EXAM:  Vital Signs Last 24 Hrs  T(C): 36.2 (24 Mar 2022 12:00), Max: 36.7 (23 Mar 2022 22:02)  T(F): 97.2 (24 Mar 2022 12:00), Max: 98.1 (23 Mar 2022 22:02)  HR: 107 (24 Mar 2022 12:00) (77 - 107)  BP: 150/73 (24 Mar 2022 12:00) (145/83 - 150/73)  BP(mean): 112 (24 Mar 2022 05:38) (109 - 112)  RR: 18 (24 Mar 2022 12:00) (16 - 18)  SpO2: 100% (24 Mar 2022 12:00) (97% - 100%)    CONSTITUTIONAL: NAD, well-developed, well-groomed  RESPIRATORY: Normal respiratory effort; lungs are clear to auscultation bilaterally  CARDIOVASCULAR: Regular rate and rhythm, normal S1 and S2, no murmur/rub/gallop; No lower extremity edema  GASTROINTESTINAL: Nontender to palpation, normoactive bowel sounds, no rebound/guarding; No hepatosplenomegaly  MUSCULOSKELETAL:  no clubbing or cyanosis of digits; no joint swelling or tenderness to palpation  NEUROLOGY: non-focal; no gross sensory deficits   PSYCH: A+O to person, place, and time; affect appropriate  SKIN: No rashes; warm     LABS:                        11.9   9.42  )-----------( 327      ( 23 Mar 2022 06:37 )             36.6     -    138  |  101  |  8   ----------------------------<  213<H>  3.5   |  23  |  0.41<L>    Ca    8.6      24 Mar 2022 07:03  Phos  2.9     -  Mg     1.70     -24    TPro  6.7  /  Alb  4.1  /  TBili  0.5  /  DBili  x   /  AST  59<H>  /  ALT  59<H>  /  AlkPhos  71  -24      CARDIAC MARKERS ( 22 Mar 2022 21:14 )  x     / x     / x     / x     / 2.0 ng/mL      Urinalysis Basic - ( 22 Mar 2022 17:42 )    Color: Colorless / Appearance: Clear / S.036 / pH: x  Gluc: x / Ketone: Negative  / Bili: Negative / Urobili: <2 mg/dL   Blood: x / Protein: Negative / Nitrite: Negative   Leuk Esterase: Negative / RBC: x / WBC x   Sq Epi: x / Non Sq Epi: x / Bacteria: x        Culture - Blood (collected 23 Mar 2022 10:20)  Source: .Blood Blood-Venous  Preliminary Report (24 Mar 2022 11:01):    No growth to date.    Culture - Blood (collected 23 Mar 2022 10:20)  Source: .Blood Blood-Peripheral  Preliminary Report (24 Mar 2022 11:01):    No growth to date.    Culture - Urine (collected 23 Mar 2022 06:26)  Source: Clean Catch Clean Catch (Midstream)  Final Report (24 Mar 2022 07:35):    <10,000 CFU/mL Normal Urogenital Katie    Culture - Urine (collected 22 Mar 2022 18:18)  Source: Clean Catch Clean Catch (Midstream)  Final Report (23 Mar 2022 17:37):    <10,000 CFU/mL Normal Urogenital Katie        RADIOLOGY & ADDITIONAL TESTS:  Results Reviewed:   Imaging Personally Reviewed:  Electrocardiogram Personally Reviewed:    COORDINATION OF CARE:  Care Discussed with Consultants/Other Providers [Y/N]:  Prior or Outpatient Records Reviewed [Y/N]:

## 2022-03-24 NOTE — CONSULT NOTE ADULT - ASSESSMENT
70 y/o [place] speaking female  F w/ PMH T2DM, HTN, HLD, gastric ulcer (per patient) presents with SOB on exertion and chest pressure along with abdominal pain, with plan for cardiac cath tomorrow  endocrine called for DM assistance  a1c 8.5  home meds : metformin, Januvia and Lantus 24 units (pt confirms metformin and Januvia) Lantus as per the med rec (which is not complete)    While inpatient, patient has marked hyperglycemia this afternoon in the setting of eating rice and oranges that she has from home    I discussed with the patient that it is important not to eat and snack on high carb meals in between breakfast lunch and dinner and to try her best to eat meals during the times that nurses will check her fingersticks and allow them to  give her insulin prior to the meal to allow for best glycemic control.  I discussed with the patient if she snacks and eats in between fingerstick checks it was hard to establish a regimen to help target her hyperglycemia      1. DM   While inpatient  BG target 100-180 mg/dl, remains above goal  3/24  pt to start steroids tonight for cardiac cath due to contrast allergy  she will ne NPO after MD, hopefully, when NPO and steroids, her glucose should remain in control, as steroids lead to a marked post prandial hyperglycemia  this evening, if glucose >300, hold dinner, give correction insulin, check glucose in 2 hrs  if glucose <250 at that time, give pt dinner and premeal insulin at that time  when eating:  Lantus 24 units at night   would increase to ademelog 8 units premeals when eating     when NPO tonight:  - Lantus  22  units qhs is OKAY (anticipating likely increase with steroids)  - Admelog  Low dose Correction Scale every 4 hrs after MD  -if hyperglycemia ensues wit low doses scale, increase to moderate dose scale q4hrs    - Please put Hypoglycemia protocol in place   - Carb Consistent Diet   - Nutrition consult   -reccomend that pt have DM education prior to dc with glycemic goals, currently only checking glucose in the am when on insulin      DC planning  Home meds:  metformin, Januvia and Lantus 24 units (pt confirms metformin and Januvia) Lantus as per the med rec (which is not compelte, please confirm this)  dc on home meds as a1c 8.5,  repeat lactate prior to dc on metformin, if LFTS are stable and lactate nl dc on metformin okay with pcp followup        2. HTN  goal BP in DM<130/80        3. HLD  pt is on lipitor 20mg             April Parker MD  Attending Physician   Department of Endocrinology, Diabetes and Metabolism     Pager  [phone] [please provide 10 digit call back number]  ELENI 9-5  [email or web address]  Nights and weekends: [phone]  Please note that this patient may be followed by a different provider tomorrow.   If no answer or after hours, please contact [phone].  For final dc reccomendations, please call [phone] or page the endocrine fellow on call.   70 y/o [place] speaking female  F w/ PMH T2DM, HTN, HLD, gastric ulcer (per patient) presents with SOB on exertion and chest pressure along with abdominal pain, with plan for cardiac cath tomorrow  endocrine called for DM assistance  a1c 8.5  home meds : metformin, Januvia and Lantus 24 units (pt confirms metformin and Januvia) Lantus as per the med rec (which is not complete)    While inpatient, patient has marked hyperglycemia this afternoon in the setting of eating rice and oranges that she has from home    I discussed with the patient that it is important not to eat and snack on high carb meals in between breakfast lunch and dinner and to try her best to eat meals during the times that nurses will check her fingersticks and allow them to  give her insulin prior to the meal to allow for best glycemic control.  I discussed with the patient if she snacks and eats in between fingerstick checks it was hard to establish a regimen to help target her hyperglycemia      1. DM   While inpatient  BG target 100-180 mg/dl, remains above goal  3/24  pt to start steroids tonight for cardiac cath due to contrast allergy  she will ne NPO after MD, hopefully, when NPO and steroids, her glucose should remain in control, as steroids lead to a marked post prandial hyperglycemia  this evening, if glucose >300, hold dinner, give correction insulin, check glucose in 2 hrs  if glucose <250 at that time, give pt dinner and premeal insulin at that time  when eating:  Lantus 24 units at night   would increase to ademelog 8 units premeals when eating     when NPO tonight:  - Lantus  22  units qhs is OKAY (will not decrease to 18 units or so with NPO dose as I anticipating likely needing more increase with steroids)  - Admelog  Low dose Correction Scale every 4 hrs after MD  -if hyperglycemia ensues wit low doses scale, increase to moderate dose scale q4hrs    - Please put Hypoglycemia protocol in place   - Carb Consistent Diet   - Nutrition consult   -reccomend that pt have DM education prior to dc with glycemic goals, currently only checking glucose in the am when on insulin          DC planning  Home meds:  metformin, Januvia and Lantus 24 units (pt confirms metformin and Januvia) Lantus as per the med rec (which is not compelte, please confirm this)  dc on home meds as a1c 8.5,  repeat lactate prior to dc on metformin, if LFTS are stable and lactate nl dc on metformin okay with pcp followup        2. HTN  goal BP in DM<130/80        3. HLD  pt is on lipitor 20mg             April Parker MD  Attending Physician   Department of Endocrinology, Diabetes and Metabolism     Pager  [phone] [please provide 10 digit call back number]  ELENI 9-5  [email or web address]  Nights and weekends: [phone]  Please note that this patient may be followed by a different provider tomorrow.   If no answer or after hours, please contact [phone].  For final dc reccomendations, please call [phone] or page the endocrine fellow on call.

## 2022-03-24 NOTE — PHYSICAL THERAPY INITIAL EVALUATION ADULT - ADDITIONAL COMMENTS
Pt son at bedside to provide social history. He states pt lives with him in a 3rd floor apartment with an elevator up and stairs outside. He reports pt was fully independent and was a community ambulator without need for any device.    Following evaluation, pt was left semireclined in bed in no acute distress, +tele.

## 2022-03-24 NOTE — PHYSICAL THERAPY INITIAL EVALUATION ADULT - PATIENT PROFILE REVIEW, REHAB EVAL
No specific activity orders. Spoke to DANAY Ramirez prior to session, pt cleared for participation in PT evaluation./yes

## 2022-03-24 NOTE — CONSULT NOTE ADULT - SUBJECTIVE AND OBJECTIVE BOX
Cardiovascular Disease Initial Evaluation    CHIEF COMPLAINT: SOB    HISTORY OF PRESENT ILLNESS:    This is a 71 year old woman with uncontrolled IDDM (HbA1c 8%), HTN, HLD, and gastric ulcer who presented to [facility] on 3/22/2022 with SOB on exertion and chest pressure along with abdominal pain. The SOB on exertion has been present for about 1.5 months. Patient states she usually takes albuterol for SOB, however over this time period her inhaler no longer helped improve her symptoms. Her symptoms occur on exertion and are associated with a chest pressure. States that sometimes she feels minimal chest pressure at rest. Patient has been hospitalized previously in 10/2021 with similar symptoms and received workup including echo and stress test. Echo showed EF 68% and no explanation for her symptoms. Nuclear stress test was normal. Patient denies ever having palpitations or overt chest pain.          Allergies  No Known Drug Allergies  Seafood (Hives; Rash)        MEDICATIONS:  amLODIPine   Tablet 10 milliGRAM(s) Oral daily  aspirin  chewable 81 milliGRAM(s) Oral daily        acetaminophen     Tablet .. 650 milliGRAM(s) Oral every 6 hours PRN  melatonin 3 milliGRAM(s) Oral at bedtime PRN    pantoprazole    Tablet 40 milliGRAM(s) Oral before breakfast    atorvastatin 20 milliGRAM(s) Oral at bedtime  dextrose 40% Gel 15 Gram(s) Oral once  dextrose 50% Injectable 25 Gram(s) IV Push once  dextrose 50% Injectable 12.5 Gram(s) IV Push once  dextrose 50% Injectable 25 Gram(s) IV Push once  insulin glargine Injectable (LANTUS) 18 Unit(s) SubCutaneous at bedtime  insulin lispro (ADMELOG) corrective regimen sliding scale   SubCutaneous three times a day before meals  insulin lispro (ADMELOG) corrective regimen sliding scale   SubCutaneous at bedtime  insulin lispro Injectable (ADMELOG) 3 Unit(s) SubCutaneous three times a day before meals    cyanocobalamin 1000 MICROGram(s) Oral daily  sodium chloride 0.9%. 1000 milliLiter(s) IV Continuous <Continuous>      PAST MEDICAL & SURGICAL HISTORY:  HTN (hypertension)    DM (diabetes mellitus)    HLD (hyperlipidemia)    No significant past surgical history        FAMILY HISTORY:  Family history of hypertension  Father        SOCIAL HISTORY:    The patient is a nonsmoker       REVIEW OF SYSTEMS:  See HPI, otherwise complete 14 point review of systems negative      PHYSICAL EXAM:  T(C): 36.3 (03-24-22 @ 05:38), Max: 36.7 (03-23-22 @ 10:25)  HR: 77 (03-24-22 @ 05:38) (77 - 99)  BP: 145/83 (03-24-22 @ 05:38) (131/68 - 148/69)  RR: 18 (03-24-22 @ 05:38) (16 - 19)  SpO2: 97% (03-24-22 @ 05:38) (97% - 100%)  Wt(kg): --  I&O's Summary      Appearance: No Acute Distress; resting comfortably  HEENT:  Normal oral mucosa, PERRL, EOMI	  Cardiovascular: Normal S1 S2, No JVD, No murmurs/rubs/gallops  Respiratory: Normal respiratory effort; Lungs clear to auscultation bilaterally  Gastrointestinal:  Soft, Non-tender, + BS	  Skin: No rashes, No ecchymoses, No cyanosis	  Neurologic: Non-focal; no weakness  Extremities: No clubbing, cyanosis or edema  Vascular: Peripheral pulses palpable 2+ bilaterally  Psychiatry: A & O x 3, Mood & affect appropriate    Laboratory Data:	 	    CBC Full  -  ( 23 Mar 2022 06:37 )  WBC Count : 9.42 K/uL  Hemoglobin : 11.9 g/dL  Hematocrit : 36.6 %  Platelet Count - Automated : 327 K/uL  Mean Cell Volume : 74.5 fL  Mean Cell Hemoglobin : 24.2 pg  Mean Cell Hemoglobin Concentration : 32.5 gm/dL  Auto Neutrophil # : 5.09 K/uL  Auto Lymphocyte # : 3.27 K/uL  Auto Monocyte # : 0.61 K/uL  Auto Eosinophil # : 0.37 K/uL  Auto Basophil # : 0.06 K/uL  Auto Neutrophil % : 54.1 %  Auto Lymphocyte % : 34.7 %  Auto Monocyte % : 6.5 %  Auto Eosinophil % : 3.9 %  Auto Basophil % : 0.6 %    03-24    138  |  101  |  8   ----------------------------<  213<H>  3.5   |  23  |  0.41<L>  03-23    141  |  103  |  6<L>  ----------------------------<  122<H>  3.8   |  24  |  0.46<L>    Ca    8.6      24 Mar 2022 07:03  Ca    8.7      23 Mar 2022 06:37  Phos  2.9     03-24  Phos  3.2     03-23  Mg     1.70     03-24  Mg     1.80     03-23    TPro  6.7  /  Alb  4.1  /  TBili  0.5  /  DBili  x   /  AST  59<H>  /  ALT  59<H>  /  AlkPhos  71  03-24  TPro  7.3  /  Alb  4.3  /  TBili  0.5  /  DBili  x   /  AST  62<H>  /  ALT  58<H>  /  AlkPhos  73  03-23      Interpretation of Telemetry: Sinus	    ECG:  	Normal sinus rhythm 	    Assessment: 71 year old woman with uncontrolled IDDM (HbA1c 8%), HTN, HLD, and gastric ulcer presents with chest pain and SOB concerning for angina.     Plan of Care:    #Exertional dyspnea-  Ms. Gupta has ruled out for ACS, however her symptoms are concerning for angina.  Given her risk factors of uncontrolled DM, HTN, HLD and classical symptoms, I recommend a left heart cath.  I spoke with Ms. Gupta and her daughter this morning regarding cath.  They would like to speak with her son, Luis Felipe, first    72 minutes spent on total encounter; more than 50% of the visit was spent counseling and/or coordinating care by the attending physician.   	  Camron Emanuel MD [facility]  Cardiovascular Diseases  [phone]     Cardiovascular Disease Initial Evaluation    CHIEF COMPLAINT: SOB    HISTORY OF PRESENT ILLNESS:    This is a 71 year old woman with uncontrolled IDDM (HbA1c 8%), HTN, HLD, and gastric ulcer who presented to [facility] on 3/22/2022 with SOB on exertion and chest pressure along with abdominal pain. The SOB on exertion has been present for about 1.5 months. Patient states she usually takes albuterol for SOB, however over this time period her inhaler no longer helped improve her symptoms. Her symptoms occur on exertion and are associated with a chest pressure. States that sometimes she feels minimal chest pressure at rest. Patient has been hospitalized previously in 10/2021 with similar symptoms and received workup including echo and stress test. Echo showed EF 68% and no explanation for her symptoms. Nuclear stress test was normal. Patient denies ever having palpitations or overt chest pain.          Allergies  No Known Drug Allergies  Seafood (Hives; Rash)        MEDICATIONS:  amLODIPine   Tablet 10 milliGRAM(s) Oral daily  aspirin  chewable 81 milliGRAM(s) Oral daily        acetaminophen     Tablet .. 650 milliGRAM(s) Oral every 6 hours PRN  melatonin 3 milliGRAM(s) Oral at bedtime PRN    pantoprazole    Tablet 40 milliGRAM(s) Oral before breakfast    atorvastatin 20 milliGRAM(s) Oral at bedtime  dextrose 40% Gel 15 Gram(s) Oral once  dextrose 50% Injectable 25 Gram(s) IV Push once  dextrose 50% Injectable 12.5 Gram(s) IV Push once  dextrose 50% Injectable 25 Gram(s) IV Push once  insulin glargine Injectable (LANTUS) 18 Unit(s) SubCutaneous at bedtime  insulin lispro (ADMELOG) corrective regimen sliding scale   SubCutaneous three times a day before meals  insulin lispro (ADMELOG) corrective regimen sliding scale   SubCutaneous at bedtime  insulin lispro Injectable (ADMELOG) 3 Unit(s) SubCutaneous three times a day before meals    cyanocobalamin 1000 MICROGram(s) Oral daily  sodium chloride 0.9%. 1000 milliLiter(s) IV Continuous <Continuous>      PAST MEDICAL & SURGICAL HISTORY:  HTN (hypertension)    DM (diabetes mellitus)    HLD (hyperlipidemia)    No significant past surgical history        FAMILY HISTORY:  Family history of hypertension  Father        SOCIAL HISTORY:    The patient is a nonsmoker       REVIEW OF SYSTEMS:  See HPI, otherwise complete 14 point review of systems negative      PHYSICAL EXAM:  T(C): 36.3 (03-24-22 @ 05:38), Max: 36.7 (03-23-22 @ 10:25)  HR: 77 (03-24-22 @ 05:38) (77 - 99)  BP: 145/83 (03-24-22 @ 05:38) (131/68 - 148/69)  RR: 18 (03-24-22 @ 05:38) (16 - 19)  SpO2: 97% (03-24-22 @ 05:38) (97% - 100%)  Wt(kg): --  I&O's Summary      Appearance: No Acute Distress; resting comfortably  HEENT:  Normal oral mucosa, PERRL, EOMI	  Cardiovascular: Normal S1 S2, No JVD, No murmurs/rubs/gallops  Respiratory: Normal respiratory effort; Lungs clear to auscultation bilaterally  Gastrointestinal:  Soft, Non-tender, + BS	  Skin: No rashes, No ecchymoses, No cyanosis	  Neurologic: Non-focal; no weakness  Extremities: No clubbing, cyanosis or edema  Vascular: Peripheral pulses palpable 2+ bilaterally  Psychiatry: A & O x 3, Mood & affect appropriate    Laboratory Data:	 	    CBC Full  -  ( 23 Mar 2022 06:37 )  WBC Count : 9.42 K/uL  Hemoglobin : 11.9 g/dL  Hematocrit : 36.6 %  Platelet Count - Automated : 327 K/uL  Mean Cell Volume : 74.5 fL  Mean Cell Hemoglobin : 24.2 pg  Mean Cell Hemoglobin Concentration : 32.5 gm/dL  Auto Neutrophil # : 5.09 K/uL  Auto Lymphocyte # : 3.27 K/uL  Auto Monocyte # : 0.61 K/uL  Auto Eosinophil # : 0.37 K/uL  Auto Basophil # : 0.06 K/uL  Auto Neutrophil % : 54.1 %  Auto Lymphocyte % : 34.7 %  Auto Monocyte % : 6.5 %  Auto Eosinophil % : 3.9 %  Auto Basophil % : 0.6 %    03-24    138  |  101  |  8   ----------------------------<  213<H>  3.5   |  23  |  0.41<L>  03-23    141  |  103  |  6<L>  ----------------------------<  122<H>  3.8   |  24  |  0.46<L>    Ca    8.6      24 Mar 2022 07:03  Ca    8.7      23 Mar 2022 06:37  Phos  2.9     03-24  Phos  3.2     03-23  Mg     1.70     03-24  Mg     1.80     03-23    TPro  6.7  /  Alb  4.1  /  TBili  0.5  /  DBili  x   /  AST  59<H>  /  ALT  59<H>  /  AlkPhos  71  03-24  TPro  7.3  /  Alb  4.3  /  TBili  0.5  /  DBili  x   /  AST  62<H>  /  ALT  58<H>  /  AlkPhos  73  03-23      Interpretation of Telemetry: Sinus	    ECG:  	Normal sinus rhythm 	    Assessment: 71 year old woman with uncontrolled IDDM (HbA1c 8%), HTN, HLD, and gastric ulcer presents with chest pain and SOB concerning for angina.     Plan of Care:    #Exertional dyspnea-  Ms. Gupta has ruled out for ACS, however her symptoms are concerning for angina.  Given her risk factors of uncontrolled DM, HTN, HLD and classical symptoms, I recommend a left heart cath.  I spoke with Ms. Gupta and her daughter this morning regarding cath.  They would like to speak with her son, Luis Felipe,  before deciding.   I provided my contact number.   If patient agrees to cath, she would need premedication with prednisone 50 mg 13 hours, 7 hours, and 1 hour pre cath given shellfish allergy.     #HTN-  BP controlled on the current regimen.         52 minutes spent on total encounter; more than 50% of the visit was spent counseling and/or coordinating care by the attending physician.   	  Camron Emanuel MD [facility]  Cardiovascular Diseases  [phone]

## 2022-03-25 LAB
ANION GAP SERPL CALC-SCNC: 18 MMOL/L — HIGH (ref 7–14)
BUN SERPL-MCNC: 14 MG/DL — SIGNIFICANT CHANGE UP (ref 7–23)
CALCIUM SERPL-MCNC: 10 MG/DL — SIGNIFICANT CHANGE UP (ref 8.4–10.5)
CHLORIDE SERPL-SCNC: 100 MMOL/L — SIGNIFICANT CHANGE UP (ref 98–107)
CO2 SERPL-SCNC: 20 MMOL/L — LOW (ref 22–31)
CREAT SERPL-MCNC: 0.48 MG/DL — LOW (ref 0.5–1.3)
EGFR: 101 ML/MIN/1.73M2 — SIGNIFICANT CHANGE UP
GLUCOSE BLDC GLUCOMTR-MCNC: 259 MG/DL — HIGH (ref 70–99)
GLUCOSE BLDC GLUCOMTR-MCNC: 283 MG/DL — HIGH (ref 70–99)
GLUCOSE BLDC GLUCOMTR-MCNC: 309 MG/DL — HIGH (ref 70–99)
GLUCOSE BLDC GLUCOMTR-MCNC: 323 MG/DL — HIGH (ref 70–99)
GLUCOSE BLDC GLUCOMTR-MCNC: 337 MG/DL — HIGH (ref 70–99)
GLUCOSE SERPL-MCNC: 385 MG/DL — HIGH (ref 70–99)
HCT VFR BLD CALC: 38.5 % — SIGNIFICANT CHANGE UP (ref 34.5–45)
HGB BLD-MCNC: 12.4 G/DL — SIGNIFICANT CHANGE UP (ref 11.5–15.5)
MAGNESIUM SERPL-MCNC: 1.9 MG/DL — SIGNIFICANT CHANGE UP (ref 1.6–2.6)
MCHC RBC-ENTMCNC: 23.7 PG — LOW (ref 27–34)
MCHC RBC-ENTMCNC: 32.2 GM/DL — SIGNIFICANT CHANGE UP (ref 32–36)
MCV RBC AUTO: 73.6 FL — LOW (ref 80–100)
NRBC # BLD: 0 /100 WBCS — SIGNIFICANT CHANGE UP
NRBC # FLD: 0 K/UL — SIGNIFICANT CHANGE UP
PHOSPHATE SERPL-MCNC: 3.5 MG/DL — SIGNIFICANT CHANGE UP (ref 2.5–4.5)
PLATELET # BLD AUTO: 351 K/UL — SIGNIFICANT CHANGE UP (ref 150–400)
POTASSIUM SERPL-MCNC: 4.2 MMOL/L — SIGNIFICANT CHANGE UP (ref 3.5–5.3)
POTASSIUM SERPL-SCNC: 4.2 MMOL/L — SIGNIFICANT CHANGE UP (ref 3.5–5.3)
RBC # BLD: 5.23 M/UL — HIGH (ref 3.8–5.2)
RBC # FLD: 15.9 % — HIGH (ref 10.3–14.5)
SODIUM SERPL-SCNC: 138 MMOL/L — SIGNIFICANT CHANGE UP (ref 135–145)
WBC # BLD: 9.5 K/UL — SIGNIFICANT CHANGE UP (ref 3.8–10.5)
WBC # FLD AUTO: 9.5 K/UL — SIGNIFICANT CHANGE UP (ref 3.8–10.5)

## 2022-03-25 PROCEDURE — 99232 SBSQ HOSP IP/OBS MODERATE 35: CPT

## 2022-03-25 PROCEDURE — 99233 SBSQ HOSP IP/OBS HIGH 50: CPT

## 2022-03-25 PROCEDURE — 93458 L HRT ARTERY/VENTRICLE ANGIO: CPT | Mod: 26

## 2022-03-25 PROCEDURE — 99152 MOD SED SAME PHYS/QHP 5/>YRS: CPT

## 2022-03-25 RX ORDER — FAMOTIDINE 10 MG/ML
1 INJECTION INTRAVENOUS
Qty: 0 | Refills: 0 | DISCHARGE

## 2022-03-25 RX ORDER — AMLODIPINE BESYLATE 2.5 MG/1
1 TABLET ORAL
Qty: 0 | Refills: 0 | DISCHARGE

## 2022-03-25 RX ORDER — PREGABALIN 225 MG/1
1 CAPSULE ORAL
Qty: 0 | Refills: 0 | DISCHARGE

## 2022-03-25 RX ORDER — ASPIRIN/CALCIUM CARB/MAGNESIUM 324 MG
1 TABLET ORAL
Qty: 0 | Refills: 0 | DISCHARGE

## 2022-03-25 RX ORDER — INSULIN LISPRO 100/ML
VIAL (ML) SUBCUTANEOUS
Refills: 0 | Status: DISCONTINUED | OUTPATIENT
Start: 2022-03-25 | End: 2022-03-27

## 2022-03-25 RX ORDER — INSULIN LISPRO 100/ML
VIAL (ML) SUBCUTANEOUS EVERY 4 HOURS
Refills: 0 | Status: DISCONTINUED | OUTPATIENT
Start: 2022-03-25 | End: 2022-03-25

## 2022-03-25 RX ORDER — SODIUM CHLORIDE 9 MG/ML
1000 INJECTION, SOLUTION INTRAVENOUS
Refills: 0 | Status: DISCONTINUED | OUTPATIENT
Start: 2022-03-25 | End: 2022-03-26

## 2022-03-25 RX ORDER — INSULIN LISPRO 100/ML
VIAL (ML) SUBCUTANEOUS AT BEDTIME
Refills: 0 | Status: DISCONTINUED | OUTPATIENT
Start: 2022-03-25 | End: 2022-03-27

## 2022-03-25 RX ORDER — SODIUM CHLORIDE 9 MG/ML
1000 INJECTION INTRAMUSCULAR; INTRAVENOUS; SUBCUTANEOUS
Refills: 0 | Status: DISCONTINUED | OUTPATIENT
Start: 2022-03-25 | End: 2022-03-25

## 2022-03-25 RX ORDER — SITAGLIPTIN 50 MG/1
1 TABLET, FILM COATED ORAL
Qty: 0 | Refills: 0 | DISCHARGE

## 2022-03-25 RX ORDER — ENOXAPARIN SODIUM 100 MG/ML
24 INJECTION SUBCUTANEOUS
Qty: 0 | Refills: 0 | DISCHARGE

## 2022-03-25 RX ORDER — ENOXAPARIN SODIUM 100 MG/ML
20 INJECTION SUBCUTANEOUS
Qty: 0 | Refills: 0 | DISCHARGE

## 2022-03-25 RX ADMIN — Medication 6: at 14:03

## 2022-03-25 RX ADMIN — Medication 50 MILLIGRAM(S): at 03:05

## 2022-03-25 RX ADMIN — AMLODIPINE BESYLATE 10 MILLIGRAM(S): 2.5 TABLET ORAL at 05:50

## 2022-03-25 RX ADMIN — PANTOPRAZOLE SODIUM 40 MILLIGRAM(S): 20 TABLET, DELAYED RELEASE ORAL at 05:51

## 2022-03-25 RX ADMIN — Medication 50 MILLIGRAM(S): at 09:04

## 2022-03-25 RX ADMIN — Medication 8 UNIT(S): at 17:40

## 2022-03-25 RX ADMIN — Medication 4: at 17:40

## 2022-03-25 RX ADMIN — PREGABALIN 1000 MICROGRAM(S): 225 CAPSULE ORAL at 17:40

## 2022-03-25 RX ADMIN — SODIUM CHLORIDE 75 MILLILITER(S): 9 INJECTION, SOLUTION INTRAVENOUS at 15:37

## 2022-03-25 RX ADMIN — INSULIN GLARGINE 24 UNIT(S): 100 INJECTION, SOLUTION SUBCUTANEOUS at 21:49

## 2022-03-25 RX ADMIN — Medication 8 UNIT(S): at 13:57

## 2022-03-25 RX ADMIN — ATORVASTATIN CALCIUM 20 MILLIGRAM(S): 80 TABLET, FILM COATED ORAL at 21:48

## 2022-03-25 RX ADMIN — Medication 81 MILLIGRAM(S): at 09:37

## 2022-03-25 RX ADMIN — Medication 2: at 21:49

## 2022-03-25 RX ADMIN — Medication 4: at 06:05

## 2022-03-25 NOTE — PROGRESS NOTE ADULT - SUBJECTIVE AND OBJECTIVE BOX
Gill Mansfield MD  [facility] Division of Hospital Medicine  Pager [phone] (M-F 8AM-5PM)  Other Times: [id number]    Patient is a 71y old  Female who presents with a chief complaint of SOB on exertion/abdominal pain (25 Mar 2022 06:58)    SUBJECTIVE / OVERNIGHT EVENTS: s/p cath, feeling well, tolerated procedure well     MEDICATIONS  (STANDING):  amLODIPine   Tablet 10 milliGRAM(s) Oral daily  aspirin  chewable 81 milliGRAM(s) Oral daily  atorvastatin 20 milliGRAM(s) Oral at bedtime  cyanocobalamin 1000 MICROGram(s) Oral daily  dextrose 40% Gel 15 Gram(s) Oral once  dextrose 50% Injectable 25 Gram(s) IV Push once  dextrose 50% Injectable 12.5 Gram(s) IV Push once  dextrose 50% Injectable 25 Gram(s) IV Push once  insulin glargine Injectable (LANTUS) 24 Unit(s) SubCutaneous at bedtime  insulin lispro (ADMELOG) corrective regimen sliding scale   SubCutaneous every 4 hours  insulin lispro Injectable (ADMELOG) 8 Unit(s) SubCutaneous three times a day before meals  lactated ringers. 1000 milliLiter(s) (75 mL/Hr) IV Continuous <Continuous>  pantoprazole    Tablet 40 milliGRAM(s) Oral before breakfast    MEDICATIONS  (PRN):  acetaminophen     Tablet .. 650 milliGRAM(s) Oral every 6 hours PRN Temp greater or equal to 38C (100.4F), Mild Pain (1 - 3)  melatonin 3 milliGRAM(s) Oral at bedtime PRN Insomnia    PHYSICAL EXAM:  Vital Signs Last 24 Hrs  T(C): 36.8 (25 Mar 2022 09:00), Max: 36.8 (25 Mar 2022 09:00)  T(F): 98.2 (25 Mar 2022 09:00), Max: 98.2 (25 Mar 2022 09:00)  HR: 98 (25 Mar 2022 09:00) (96 - 99)  BP: 144/78 (25 Mar 2022 09:00) (144/78 - 151/83)  RR: 17 (25 Mar 2022 09:00) (17 - 18)  SpO2: 99% (25 Mar 2022 09:00) (97% - 99%)    CONSTITUTIONAL: NAD, well-developed, well-groomed  RESPIRATORY: Normal respiratory effort; lungs are clear to auscultation bilaterally  CARDIOVASCULAR: Regular rate and rhythm, normal S1 and S2, no murmur/rub/gallop; No lower extremity edema  GASTROINTESTINAL: Nontender to palpation, normoactive bowel sounds, no rebound/guarding; No hepatosplenomegaly  MUSCULOSKELETAL:  no clubbing or cyanosis of digits; no joint swelling or tenderness to palpation  NEUROLOGY: non-focal; no gross sensory deficits   PSYCH: A+O to person, place, and time; affect appropriate  SKIN: No rashes; warm     LABS:                        12.4   9.50  )-----------( 351      ( 25 Mar 2022 06:42 )             38.5     03-25    138  |  100  |  14  ----------------------------<  385<H>  4.2   |  20<L>  |  0.48<L>    Ca    10.0      25 Mar 2022 06:42  Phos  3.5     03-25  Mg     1.90     03-25    TPro  6.7  /  Alb  4.1  /  TBili  0.5  /  DBili  x   /  AST  59<H>  /  ALT  59<H>  /  AlkPhos  71  03-24              Culture - Blood (collected 23 Mar 2022 10:20)  Source: .Blood Blood-Venous  Preliminary Report (24 Mar 2022 11:01):    No growth to date.    Culture - Blood (collected 23 Mar 2022 10:20)  Source: .Blood Blood-Peripheral  Preliminary Report (24 Mar 2022 11:01):    No growth to date.    Culture - Urine (collected 23 Mar 2022 06:26)  Source: Clean Catch Clean Catch (Midstream)  Final Report (24 Mar 2022 07:35):    <10,000 CFU/mL Normal Urogenital Katie    Culture - Urine (collected 22 Mar 2022 18:18)  Source: Clean Catch Clean Catch (Midstream)  Final Report (23 Mar 2022 17:37):    <10,000 CFU/mL Normal Urogenital Katie        RADIOLOGY & ADDITIONAL TESTS:  Results Reviewed:   Imaging Personally Reviewed:  Electrocardiogram Personally Reviewed:    COORDINATION OF CARE:  Care Discussed with Consultants/Other Providers [Y/N]:  Prior or Outpatient Records Reviewed [Y/N]:

## 2022-03-25 NOTE — CHART NOTE - NSCHARTNOTEFT_GEN_A_CORE
MARTIN MIGUEL  MRN-[id number] 71y    Patient status post cath via right radial artery.  Site clean, dry, intact. Pulses present, capillary refill appropriate, so swelling, no hematoma noted. Will continue to  follow closely.    ICU Vital Signs Last 24 Hrs  T(C): 36.4 (25 Mar 2022 21:23), Max: 36.8 (25 Mar 2022 09:00)  T(F): 97.6 (25 Mar 2022 21:23), Max: 98.2 (25 Mar 2022 09:00)  HR: 95 (25 Mar 2022 21:23) (95 - 102)  BP: 126/70 (25 Mar 2022 21:23) (126/70 - 147/80)  BP(mean): --  ABP: --  ABP(mean): --  RR: 20 (25 Mar 2022 21:23) (17 - 20)  SpO2: 100% (25 Mar 2022 21:23) (97% - 100%)

## 2022-03-25 NOTE — PROGRESS NOTE ADULT - SUBJECTIVE AND OBJECTIVE BOX
Cardiovascular Disease Progress Note    Overnight events: No acute events overnight. Ms. Gupta continues to report exertional chest pain and SOB.    Otherwise review of systems negative    Objective Findings:  T(C): 36.7 (03-25-22 @ 05:45), Max: 36.7 (03-25-22 @ 05:45)  HR: 96 (03-25-22 @ 05:45) (96 - 107)  BP: 147/80 (03-25-22 @ 05:45) (147/80 - 151/83)  RR: 18 (03-25-22 @ 05:45) (18 - 18)  SpO2: 97% (03-25-22 @ 05:45) (97% - 100%)  Wt(kg): --  Daily     Daily       Physical Exam:  Gen: NAD; Patient resting comfortably  HEENT: EOMI, Normocephalic/ atraumatic  CV: RRR, normal S1 + S2, no m/r/g  Lungs:  Normal respiratory effort; clear to auscultation bilaterally  Abd: soft, non-tender; bowel sounds present  Ext: No edema; warm and well perfused    Telemetry: Sinus; no ectopy    Laboratory Data:                        12.4   9.50  )-----------( 351      ( 25 Mar 2022 06:42 )             38.5     03-24    138  |  101  |  8   ----------------------------<  213<H>  3.5   |  23  |  0.41<L>    Ca    8.6      24 Mar 2022 07:03  Phos  2.9     03-24  Mg     1.70     03-24    TPro  6.7  /  Alb  4.1  /  TBili  0.5  /  DBili  x   /  AST  59<H>  /  ALT  59<H>  /  AlkPhos  71  03-24              Inpatient Medications:  MEDICATIONS  (STANDING):  amLODIPine   Tablet 10 milliGRAM(s) Oral daily  aspirin  chewable 81 milliGRAM(s) Oral daily  atorvastatin 20 milliGRAM(s) Oral at bedtime  cyanocobalamin 1000 MICROGram(s) Oral daily  dextrose 40% Gel 15 Gram(s) Oral once  dextrose 50% Injectable 25 Gram(s) IV Push once  dextrose 50% Injectable 12.5 Gram(s) IV Push once  dextrose 50% Injectable 25 Gram(s) IV Push once  insulin glargine Injectable (LANTUS) 24 Unit(s) SubCutaneous at bedtime  insulin lispro (ADMELOG) corrective regimen sliding scale   SubCutaneous three times a day before meals  insulin lispro (ADMELOG) corrective regimen sliding scale   SubCutaneous at bedtime  insulin lispro Injectable (ADMELOG) 8 Unit(s) SubCutaneous three times a day before meals  pantoprazole    Tablet 40 milliGRAM(s) Oral before breakfast  predniSONE   Tablet 50 milliGRAM(s) Oral once      Assessment: 71 year old woman with uncontrolled IDDM (HbA1c 8%), HTN, HLD, and gastric ulcer presents with chest pain and SOB concerning for angina.     Plan of Care:    #Exertional dyspnea-  Ms. Gupta has ruled out for ACS, however her symptoms are concerning for angina.  Given her risk factors of uncontrolled DM, HTN, HLD and classical symptoms, I recommend a left heart cath.  Case discussed with Ms. Gupta and her two children- they agree to cath today.   Continue ASA and statin.   Prednisone pre-medication as ordered for reported shellfish allergy.     #HTN-  BP controlled on the current regimen.       Over 25 minutes spent on total encounter; more than 50% of the visit was spent counseling and/or coordinating care by the attending physician.      Camron Emanuel MD [facility]  Cardiovascular Disease  [phone]

## 2022-03-25 NOTE — PROGRESS NOTE ADULT - SUBJECTIVE AND OBJECTIVE BOX
Chief Complaint:     History:    MEDICATIONS  (STANDING):  amLODIPine   Tablet 10 milliGRAM(s) Oral daily  aspirin  chewable 81 milliGRAM(s) Oral daily  atorvastatin 20 milliGRAM(s) Oral at bedtime  cyanocobalamin 1000 MICROGram(s) Oral daily  dextrose 40% Gel 15 Gram(s) Oral once  dextrose 50% Injectable 25 Gram(s) IV Push once  dextrose 50% Injectable 12.5 Gram(s) IV Push once  dextrose 50% Injectable 25 Gram(s) IV Push once  insulin glargine Injectable (LANTUS) 24 Unit(s) SubCutaneous at bedtime  insulin lispro (ADMELOG) corrective regimen sliding scale   SubCutaneous three times a day before meals  insulin lispro (ADMELOG) corrective regimen sliding scale   SubCutaneous at bedtime  insulin lispro Injectable (ADMELOG) 8 Unit(s) SubCutaneous three times a day before meals  lactated ringers. 1000 milliLiter(s) (75 mL/Hr) IV Continuous <Continuous>  pantoprazole    Tablet 40 milliGRAM(s) Oral before breakfast    MEDICATIONS  (PRN):  acetaminophen     Tablet .. 650 milliGRAM(s) Oral every 6 hours PRN Temp greater or equal to 38C (100.4F), Mild Pain (1 - 3)  melatonin 3 milliGRAM(s) Oral at bedtime PRN Insomnia      Allergies    No Known Drug Allergies  Seafood (Hives; Rash)    Intolerances      Review of Systems:  Constitutional: No fever  Eyes: No blurry vision  Neuro: No tremors  HEENT: No pain  Cardiovascular: No chest pain, palpitations  Respiratory: No SOB, no cough  GI: No nausea, vomiting, abdominal pain  : No dysuria  Skin: no rash  Psych: no depression  Endocrine: no polyuria, polydipsia  Hem/lymph: no swelling  Osteoporosis: no fractures    ALL OTHER SYSTEMS REVIEWED AND NEGATIVE    UNABLE TO OBTAIN    PHYSICAL EXAM:  VITALS: T(C): 36.7 (03-25-22 @ 12:52)  T(F): 98 (03-25-22 @ 12:52), Max: 98.2 (03-25-22 @ 09:00)  HR: 102 (03-25-22 @ 12:52) (96 - 102)  BP: 141/61 (03-25-22 @ 12:52) (141/61 - 151/83)  RR:  (17 - 18)  SpO2:  (97% - 99%)  Wt(kg): --  GENERAL: NAD, well-groomed, well-developed  EYES: No proptosis, no lid lag, anicteric  HEENT:  Atraumatic, Normocephalic, moist mucous membranes  THYROID: Normal size, no palpable nodules  RESPIRATORY: Clear to auscultation bilaterally; No rales, rhonchi, wheezing, or rubs  CARDIOVASCULAR: Regular rate and rhythm; No murmurs; no peripheral edema  GI: Soft, nontender, non distended, normal bowel sounds  SKIN: Dry, intact, No rashes or lesions  MUSCULOSKELETAL: Full range of motion, normal strength  NEURO: sensation intact, extraocular movements intact, no tremor, normal reflexes  PSYCH: Alert and oriented x 3, normal affect, normal mood  CUSHING'S SIGNS: no striae    POCT Blood Glucose.: 259 mg/dL (03-25-22 @ 09:29)  POCT Blood Glucose.: 337 mg/dL (03-25-22 @ 06:00)  POCT Blood Glucose.: 258 mg/dL (03-24-22 @ 21:46)  POCT Blood Glucose.: 249 mg/dL (03-24-22 @ 17:13)  POCT Blood Glucose.: 365 mg/dL (03-24-22 @ 12:16)  POCT Blood Glucose.: 456 mg/dL (03-24-22 @ 12:14)  POCT Blood Glucose.: 179 mg/dL (03-24-22 @ 08:22)  POCT Blood Glucose.: 244 mg/dL (03-23-22 @ 22:05)  POCT Blood Glucose.: 337 mg/dL (03-23-22 @ 17:32)  POCT Blood Glucose.: 301 mg/dL (03-23-22 @ 12:22)  POCT Blood Glucose.: 175 mg/dL (03-23-22 @ 09:46)  POCT Blood Glucose.: 195 mg/dL (03-23-22 @ 01:09)  POCT Blood Glucose.: 179 mg/dL (03-22-22 @ 23:33)      03-25    138  |  100  |  14  ----------------------------<  385<H>  4.2   |  20<L>  |  0.48<L>    EGFR if : x   EGFR if non : x     Ca    10.0      03-25  Mg     1.90     03-25  Phos  3.5     03-25    TPro  6.7  /  Alb  4.1  /  TBili  0.5  /  DBili  x   /  AST  59<H>  /  ALT  59<H>  /  AlkPhos  71  03-24          Thyroid Function Tests:                           Chief Complaint: DM2    History: Patient had cardiac cath today for which she was given pretreatment for contrast allergy with prednisone    MEDICATIONS  (STANDING):  amLODIPine   Tablet 10 milliGRAM(s) Oral daily  aspirin  chewable 81 milliGRAM(s) Oral daily  atorvastatin 20 milliGRAM(s) Oral at bedtime  cyanocobalamin 1000 MICROGram(s) Oral daily  dextrose 40% Gel 15 Gram(s) Oral once  dextrose 50% Injectable 25 Gram(s) IV Push once  dextrose 50% Injectable 12.5 Gram(s) IV Push once  dextrose 50% Injectable 25 Gram(s) IV Push once  insulin glargine Injectable (LANTUS) 24 Unit(s) SubCutaneous at bedtime  insulin lispro (ADMELOG) corrective regimen sliding scale   SubCutaneous three times a day before meals  insulin lispro (ADMELOG) corrective regimen sliding scale   SubCutaneous at bedtime  insulin lispro Injectable (ADMELOG) 8 Unit(s) SubCutaneous three times a day before meals  lactated ringers. 1000 milliLiter(s) (75 mL/Hr) IV Continuous <Continuous>  pantoprazole    Tablet 40 milliGRAM(s) Oral before breakfast    MEDICATIONS  (PRN):  acetaminophen     Tablet .. 650 milliGRAM(s) Oral every 6 hours PRN Temp greater or equal to 38C (100.4F), Mild Pain (1 - 3)  melatonin 3 milliGRAM(s) Oral at bedtime PRN Insomnia      Allergies    No Known Drug Allergies  Seafood (Hives; Rash)    Intolerances      Review of Systems:  Constitutional: No fever  Eyes: No blurry vision  Neuro: No tremors  HEENT: No pain  Cardiovascular: No chest pain, palpitations  Respiratory: No SOB, no cough  GI: No nausea, vomiting, abdominal pain  : No dysuria  Skin: no rash  Psych: no depression  Endocrine: no polyuria, polydipsia      ALL OTHER SYSTEMS REVIEWED AND NEGATIVE      PHYSICAL EXAM:  VITALS: T(C): 36.7 (03-25-22 @ 12:52)  T(F): 98 (03-25-22 @ 12:52), Max: 98.2 (03-25-22 @ 09:00)  HR: 102 (03-25-22 @ 12:52) (96 - 102)  BP: 141/61 (03-25-22 @ 12:52) (141/61 - 151/83)  RR:  (17 - 18)  SpO2:  (97% - 99%)  Wt(kg): --  GENERAL: NAD, well-groomed, well-developed  EYES: No proptosis, no lid lag, anicteric  HEENT:  Atraumatic, Normocephalic, moist mucous membranes  RESPIRATORY: Clear to auscultation bilaterally  CARDIOVASCULAR: Regular rate and rhythm  GI: Soft, nontender, non distended, normal bowel sounds      POCT Blood Glucose.: 259 mg/dL (03-25-22 @ 09:29)  POCT Blood Glucose.: 337 mg/dL (03-25-22 @ 06:00)  POCT Blood Glucose.: 258 mg/dL (03-24-22 @ 21:46)  POCT Blood Glucose.: 249 mg/dL (03-24-22 @ 17:13)  POCT Blood Glucose.: 365 mg/dL (03-24-22 @ 12:16)  POCT Blood Glucose.: 456 mg/dL (03-24-22 @ 12:14)  POCT Blood Glucose.: 179 mg/dL (03-24-22 @ 08:22)  POCT Blood Glucose.: 244 mg/dL (03-23-22 @ 22:05)  POCT Blood Glucose.: 337 mg/dL (03-23-22 @ 17:32)  POCT Blood Glucose.: 301 mg/dL (03-23-22 @ 12:22)  POCT Blood Glucose.: 175 mg/dL (03-23-22 @ 09:46)  POCT Blood Glucose.: 195 mg/dL (03-23-22 @ 01:09)  POCT Blood Glucose.: 179 mg/dL (03-22-22 @ 23:33)      03-25    138  |  100  |  14  ----------------------------<  385<H>  4.2   |  20<L>  |  0.48<L>    EGFR if : x   EGFR if non : x     Ca    10.0      03-25  Mg     1.90     03-25  Phos  3.5     03-25    TPro  6.7  /  Alb  4.1  /  TBili  0.5  /  DBili  x   /  AST  59<H>  /  ALT  59<H>  /  AlkPhos  71  03-24

## 2022-03-26 ENCOUNTER — TRANSCRIPTION ENCOUNTER (OUTPATIENT)
Age: 72
End: 2022-03-26

## 2022-03-26 LAB
ALBUMIN SERPL ELPH-MCNC: 4.6 G/DL — SIGNIFICANT CHANGE UP (ref 3.3–5)
ALP SERPL-CCNC: 87 U/L — SIGNIFICANT CHANGE UP (ref 40–120)
ALT FLD-CCNC: 92 U/L — HIGH (ref 4–33)
ANION GAP SERPL CALC-SCNC: 15 MMOL/L — HIGH (ref 7–14)
ANION GAP SERPL CALC-SCNC: 18 MMOL/L — HIGH (ref 7–14)
AST SERPL-CCNC: 99 U/L — HIGH (ref 4–32)
BILIRUB SERPL-MCNC: 0.7 MG/DL — SIGNIFICANT CHANGE UP (ref 0.2–1.2)
BUN SERPL-MCNC: 7 MG/DL — SIGNIFICANT CHANGE UP (ref 7–23)
BUN SERPL-MCNC: 9 MG/DL — SIGNIFICANT CHANGE UP (ref 7–23)
CALCIUM SERPL-MCNC: 7.8 MG/DL — LOW (ref 8.4–10.5)
CALCIUM SERPL-MCNC: 9.6 MG/DL — SIGNIFICANT CHANGE UP (ref 8.4–10.5)
CHLORIDE SERPL-SCNC: 102 MMOL/L — SIGNIFICANT CHANGE UP (ref 98–107)
CHLORIDE SERPL-SCNC: 99 MMOL/L — SIGNIFICANT CHANGE UP (ref 98–107)
CO2 SERPL-SCNC: 18 MMOL/L — LOW (ref 22–31)
CO2 SERPL-SCNC: 27 MMOL/L — SIGNIFICANT CHANGE UP (ref 22–31)
CREAT SERPL-MCNC: 0.31 MG/DL — LOW (ref 0.5–1.3)
CREAT SERPL-MCNC: 0.47 MG/DL — LOW (ref 0.5–1.3)
EGFR: 101 ML/MIN/1.73M2 — SIGNIFICANT CHANGE UP
EGFR: 112 ML/MIN/1.73M2 — SIGNIFICANT CHANGE UP
GLUCOSE BLDC GLUCOMTR-MCNC: 167 MG/DL — HIGH (ref 70–99)
GLUCOSE BLDC GLUCOMTR-MCNC: 211 MG/DL — HIGH (ref 70–99)
GLUCOSE BLDC GLUCOMTR-MCNC: 221 MG/DL — HIGH (ref 70–99)
GLUCOSE BLDC GLUCOMTR-MCNC: 75 MG/DL — SIGNIFICANT CHANGE UP (ref 70–99)
GLUCOSE SERPL-MCNC: 119 MG/DL — HIGH (ref 70–99)
GLUCOSE SERPL-MCNC: 146 MG/DL — HIGH (ref 70–99)
LACTATE SERPL-SCNC: 3.2 MMOL/L — HIGH (ref 0.5–2)
LACTATE SERPL-SCNC: 7 MMOL/L — CRITICAL HIGH (ref 0.5–2)
MAGNESIUM SERPL-MCNC: 1.1 MG/DL — LOW (ref 1.6–2.6)
MAGNESIUM SERPL-MCNC: 2.7 MG/DL — HIGH (ref 1.6–2.6)
PHOSPHATE SERPL-MCNC: 2 MG/DL — LOW (ref 2.5–4.5)
PHOSPHATE SERPL-MCNC: 3 MG/DL — SIGNIFICANT CHANGE UP (ref 2.5–4.5)
POTASSIUM SERPL-MCNC: 2.9 MMOL/L — CRITICAL LOW (ref 3.5–5.3)
POTASSIUM SERPL-MCNC: 3.7 MMOL/L — SIGNIFICANT CHANGE UP (ref 3.5–5.3)
POTASSIUM SERPL-SCNC: 2.9 MMOL/L — CRITICAL LOW (ref 3.5–5.3)
POTASSIUM SERPL-SCNC: 3.7 MMOL/L — SIGNIFICANT CHANGE UP (ref 3.5–5.3)
PROT SERPL-MCNC: 7.7 G/DL — SIGNIFICANT CHANGE UP (ref 6–8.3)
SODIUM SERPL-SCNC: 138 MMOL/L — SIGNIFICANT CHANGE UP (ref 135–145)
SODIUM SERPL-SCNC: 141 MMOL/L — SIGNIFICANT CHANGE UP (ref 135–145)

## 2022-03-26 PROCEDURE — 99232 SBSQ HOSP IP/OBS MODERATE 35: CPT

## 2022-03-26 RX ORDER — SODIUM CHLORIDE 9 MG/ML
1000 INJECTION INTRAMUSCULAR; INTRAVENOUS; SUBCUTANEOUS
Refills: 0 | Status: DISCONTINUED | OUTPATIENT
Start: 2022-03-26 | End: 2022-03-27

## 2022-03-26 RX ORDER — INSULIN LISPRO 100/ML
6 VIAL (ML) SUBCUTANEOUS
Refills: 0 | Status: DISCONTINUED | OUTPATIENT
Start: 2022-03-26 | End: 2022-03-27

## 2022-03-26 RX ORDER — POTASSIUM CHLORIDE 20 MEQ
10 PACKET (EA) ORAL
Refills: 0 | Status: COMPLETED | OUTPATIENT
Start: 2022-03-26 | End: 2022-03-26

## 2022-03-26 RX ORDER — MAGNESIUM SULFATE 500 MG/ML
2 VIAL (ML) INJECTION ONCE
Refills: 0 | Status: COMPLETED | OUTPATIENT
Start: 2022-03-26 | End: 2022-03-26

## 2022-03-26 RX ORDER — MAGNESIUM OXIDE 400 MG ORAL TABLET 241.3 MG
400 TABLET ORAL
Refills: 0 | Status: DISCONTINUED | OUTPATIENT
Start: 2022-03-26 | End: 2022-03-26

## 2022-03-26 RX ORDER — POTASSIUM CHLORIDE 20 MEQ
40 PACKET (EA) ORAL ONCE
Refills: 0 | Status: COMPLETED | OUTPATIENT
Start: 2022-03-26 | End: 2022-03-26

## 2022-03-26 RX ORDER — KETOROLAC TROMETHAMINE 30 MG/ML
15 SYRINGE (ML) INJECTION ONCE
Refills: 0 | Status: DISCONTINUED | OUTPATIENT
Start: 2022-03-26 | End: 2022-03-26

## 2022-03-26 RX ADMIN — SODIUM CHLORIDE 75 MILLILITER(S): 9 INJECTION, SOLUTION INTRAVENOUS at 05:05

## 2022-03-26 RX ADMIN — Medication 81 MILLIGRAM(S): at 11:27

## 2022-03-26 RX ADMIN — Medication 100 MILLIEQUIVALENT(S): at 13:58

## 2022-03-26 RX ADMIN — PREGABALIN 1000 MICROGRAM(S): 225 CAPSULE ORAL at 11:27

## 2022-03-26 RX ADMIN — AMLODIPINE BESYLATE 10 MILLIGRAM(S): 2.5 TABLET ORAL at 05:05

## 2022-03-26 RX ADMIN — Medication 100 MILLIEQUIVALENT(S): at 12:24

## 2022-03-26 RX ADMIN — Medication 650 MILLIGRAM(S): at 01:36

## 2022-03-26 RX ADMIN — PANTOPRAZOLE SODIUM 40 MILLIGRAM(S): 20 TABLET, DELAYED RELEASE ORAL at 05:05

## 2022-03-26 RX ADMIN — Medication 40 MILLIEQUIVALENT(S): at 11:27

## 2022-03-26 RX ADMIN — Medication 8 UNIT(S): at 08:34

## 2022-03-26 RX ADMIN — MAGNESIUM OXIDE 400 MG ORAL TABLET 400 MILLIGRAM(S): 241.3 TABLET ORAL at 08:33

## 2022-03-26 RX ADMIN — Medication 15 MILLIGRAM(S): at 12:38

## 2022-03-26 RX ADMIN — Medication 25 GRAM(S): at 08:35

## 2022-03-26 RX ADMIN — ATORVASTATIN CALCIUM 20 MILLIGRAM(S): 80 TABLET, FILM COATED ORAL at 22:56

## 2022-03-26 RX ADMIN — Medication 650 MILLIGRAM(S): at 02:30

## 2022-03-26 RX ADMIN — Medication 15 MILLIGRAM(S): at 11:33

## 2022-03-26 RX ADMIN — INSULIN GLARGINE 24 UNIT(S): 100 INJECTION, SOLUTION SUBCUTANEOUS at 22:55

## 2022-03-26 RX ADMIN — SODIUM CHLORIDE 75 MILLILITER(S): 9 INJECTION INTRAMUSCULAR; INTRAVENOUS; SUBCUTANEOUS at 16:50

## 2022-03-26 RX ADMIN — Medication 1: at 08:34

## 2022-03-26 RX ADMIN — MAGNESIUM OXIDE 400 MG ORAL TABLET 400 MILLIGRAM(S): 241.3 TABLET ORAL at 11:34

## 2022-03-26 RX ADMIN — Medication 8 UNIT(S): at 12:25

## 2022-03-26 RX ADMIN — Medication 100 MILLIEQUIVALENT(S): at 11:31

## 2022-03-26 RX ADMIN — Medication 6 UNIT(S): at 17:44

## 2022-03-26 RX ADMIN — Medication 2: at 17:43

## 2022-03-26 NOTE — PROGRESS NOTE ADULT - SUBJECTIVE AND OBJECTIVE BOX
Patient is a 71y old  Female who presents with a chief complaint of SOB on exertion/abdominal pain (26 Mar 2022 11:37)      SUBJECTIVE / OVERNIGHT EVENTS:    Patient seen and examined at bedside. Pt w/ abdominal pain last night now localized LLQ, lactate 7.0, w/ abdominal distention on exam, no BM, passing gas. No active CP.    MEDICATIONS  (STANDING):  amLODIPine   Tablet 10 milliGRAM(s) Oral daily  aspirin  chewable 81 milliGRAM(s) Oral daily  atorvastatin 20 milliGRAM(s) Oral at bedtime  cyanocobalamin 1000 MICROGram(s) Oral daily  dextrose 40% Gel 15 Gram(s) Oral once  dextrose 50% Injectable 25 Gram(s) IV Push once  dextrose 50% Injectable 12.5 Gram(s) IV Push once  dextrose 50% Injectable 25 Gram(s) IV Push once  insulin glargine Injectable (LANTUS) 24 Unit(s) SubCutaneous at bedtime  insulin lispro (ADMELOG) corrective regimen sliding scale   SubCutaneous three times a day before meals  insulin lispro (ADMELOG) corrective regimen sliding scale   SubCutaneous at bedtime  insulin lispro Injectable (ADMELOG) 6 Unit(s) SubCutaneous three times a day before meals  pantoprazole    Tablet 40 milliGRAM(s) Oral before breakfast  sodium chloride 0.9%. 1000 milliLiter(s) (75 mL/Hr) IV Continuous <Continuous>    MEDICATIONS  (PRN):  acetaminophen     Tablet .. 650 milliGRAM(s) Oral every 6 hours PRN Temp greater or equal to 38C (100.4F), Mild Pain (1 - 3)  melatonin 3 milliGRAM(s) Oral at bedtime PRN Insomnia      Vital Signs Last 24 Hrs  T(C): 36.8 (26 Mar 2022 14:19), Max: 36.8 (26 Mar 2022 14:19)  T(F): 98.3 (26 Mar 2022 14:19), Max: 98.3 (26 Mar 2022 14:19)  HR: 91 (26 Mar 2022 14:19) (88 - 101)  BP: 126/71 (26 Mar 2022 14:19) (126/70 - 160/66)  BP(mean): --  RR: 19 (26 Mar 2022 14:19) (17 - 20)  SpO2: 99% (26 Mar 2022 14:19) (97% - 100%)  CAPILLARY BLOOD GLUCOSE      POCT Blood Glucose.: 75 mg/dL (26 Mar 2022 12:10)  POCT Blood Glucose.: 167 mg/dL (26 Mar 2022 08:19)  POCT Blood Glucose.: 323 mg/dL (25 Mar 2022 21:29)  POCT Blood Glucose.: 309 mg/dL (25 Mar 2022 17:18)    I&O's Summary      PHYSICAL EXAM:  Vital Signs Last 24 Hrs  T(C): 36.8 (03-26-22 @ 14:19)  T(F): 98.3 (03-26-22 @ 14:19), Max: 98.3 (03-26-22 @ 14:19)  HR: 91 (03-26-22 @ 14:19) (88 - 101)  BP: 126/71 (03-26-22 @ 14:19)  BP(mean): --  RR: 19 (03-26-22 @ 14:19) (17 - 20)  SpO2: 99% (03-26-22 @ 14:19) (97% - 100%)  Wt(kg): --    Constitutional: NAD, awake and alert  EYES: EOMI  ENT:  Normal Hearing, no tonsillar exudates   Neck: Soft and supple , no thyromegaly   Respiratory: Breath sounds are clear bilaterally, No wheezing, rales or rhonchi  Cardiovascular: S1 and S2, regular rate and rhythm, no Murmurs, gallops or rubs, no JVD,    Gastrointestinal: Bowel Sounds present, soft, , distended, TTP LLQ, no guarding, no rebound  Extremities: No cyanosis or clubbing; warm to touch  Vascular: 2+ peripheral pulses lower ex  Neurological: No focal deficits, CN II-XII intact bilaterally, sensation to light touch intact in all extremities, gait intact. Pupils are equally reactive to light and symmetrical in size.   Musculoskeletal: 5/5 strength b/l upper and lower extremities; no joint swelling.  Skin: No rashes  Psych: no depression or anhedonia, AAOx3  HEME: no bruises, no nose bleeds      LABS:                        12.4   9.50  )-----------( 351      ( 25 Mar 2022 06:42 )             38.5     03-26    141  |  99  |  9   ----------------------------<  119<H>  2.9<LL>   |  27  |  0.47<L>    Ca    9.6      26 Mar 2022 10:18  Phos  3.0     03-26  Mg     2.70     03-26    TPro  7.7  /  Alb  4.6  /  TBili  0.7  /  DBili  x   /  AST  99<H>  /  ALT  92<H>  /  AlkPhos  87  03-26              RADIOLOGY & ADDITIONAL TESTS:    Imaging Personally Reviewed:    Consultant(s) Notes Reviewed:      Care Discussed with Consultants/Other Providers:

## 2022-03-26 NOTE — PROVIDER CONTACT NOTE (OTHER) - SITUATION
Pt has personal medication bottles with her in a personal bag. Pt was found to have opened a vitamin bottle, but denied taking any of the medications.

## 2022-03-26 NOTE — PROGRESS NOTE ADULT - SUBJECTIVE AND OBJECTIVE BOX
Cardiovascular Disease Progress Note    Overnight events: No acute events overnight.  Pt denies chest pain. Admits to L sided abdominal pain.   Otherwise review of systems negative    Objective Findings:  T(C): 36.5 (03-26-22 @ 05:05), Max: 36.7 (03-25-22 @ 12:52)  HR: 94 (03-26-22 @ 05:05) (93 - 102)  BP: 139/80 (03-26-22 @ 05:05) (126/70 - 160/66)  RR: 18 (03-26-22 @ 05:05) (17 - 20)  SpO2: 100% (03-26-22 @ 05:05) (98% - 100%)  Wt(kg): --  Daily     Daily       Physical Exam:  Gen: NAD; Patient resting comfortably  HEENT: EOMI, Normocephalic/ atraumatic  CV: RRR, normal S1 + S2, no m/r/g  Lungs:  Normal respiratory effort; clear to auscultation bilaterally  Abd: soft, non-tender; bowel sounds present  Ext: No edema; warm and well perfused    Telemetry: N/a    Laboratory Data:                        12.4   9.50  )-----------( 351      ( 25 Mar 2022 06:42 )             38.5     03-26    141  |  99  |  9   ----------------------------<  119<H>  2.9<LL>   |  27  |  0.47<L>    Ca    9.6      26 Mar 2022 10:18  Phos  3.0     03-26  Mg     2.70     03-26    TPro  7.7  /  Alb  4.6  /  TBili  0.7  /  DBili  x   /  AST  99<H>  /  ALT  92<H>  /  AlkPhos  87  03-26              Inpatient Medications:  MEDICATIONS  (STANDING):  amLODIPine   Tablet 10 milliGRAM(s) Oral daily  aspirin  chewable 81 milliGRAM(s) Oral daily  atorvastatin 20 milliGRAM(s) Oral at bedtime  cyanocobalamin 1000 MICROGram(s) Oral daily  dextrose 40% Gel 15 Gram(s) Oral once  dextrose 50% Injectable 25 Gram(s) IV Push once  dextrose 50% Injectable 12.5 Gram(s) IV Push once  dextrose 50% Injectable 25 Gram(s) IV Push once  insulin glargine Injectable (LANTUS) 24 Unit(s) SubCutaneous at bedtime  insulin lispro (ADMELOG) corrective regimen sliding scale   SubCutaneous three times a day before meals  insulin lispro (ADMELOG) corrective regimen sliding scale   SubCutaneous at bedtime  insulin lispro Injectable (ADMELOG) 8 Unit(s) SubCutaneous three times a day before meals  ketorolac   Injectable 15 milliGRAM(s) IV Push once  lactated ringers. 1000 milliLiter(s) (75 mL/Hr) IV Continuous <Continuous>  magnesium oxide 400 milliGRAM(s) Oral three times a day with meals  pantoprazole    Tablet 40 milliGRAM(s) Oral before breakfast  potassium chloride  10 mEq/100 mL IVPB 10 milliEquivalent(s) IV Intermittent every 1 hour      Assessment:  71 year old woman with uncontrolled IDDM (HbA1c 8%), HTN, HLD, and gastric ulcer presents with chest pain and SOB concerning for angina.     Plan of Care:    #Exertional dyspnea-  Ms. Gupta has ruled out for ACS, however her symptoms are concerning for angina.  LHC 3/25 shows mild non-obstructive CAD  Continue ASA and statin.      #HTN-  BP controlled on the current regimen.     #Hypokalemia  - Replete and maintain around 4    #Elevated Lactate  - trending down at this time  - management as per primary team            Over 25 minutes spent on total encounter; more than 50% of the visit was spent counseling and/or coordinating care by the attending physician.      Christiano Emanuel D.O.   Cardiovascular Disease  [phone]

## 2022-03-26 NOTE — DISCHARGE NOTE PROVIDER - PROVIDER TOKENS
PROVIDER:[TOKEN:[[id number]],FOLLOWUP:[2 weeks]],FREE:[LAST:[PCP],FIRST:[],PHONE:[(   )    -],FAX:[(   )    -]]

## 2022-03-26 NOTE — DISCHARGE NOTE PROVIDER - NSFOLLOWUPCLINICS_GEN_ALL_ED_FT
[facility] Endocrinology  Endocrinology  [address] [place] [address]  Phone: [phone]  Fax:     [facility] [place]  Internal Medicine  [address] [place] [address]  Phone: [phone]  Fax: [phone]    [facility] [place]  Gastroenterology  [address] [place] [address]  Phone: [phone]  Fax:

## 2022-03-26 NOTE — CHART NOTE - NSCHARTNOTEFT_GEN_A_CORE
Endocrine     Chart reviewed, borderline hypoglycemia noted   Will decrease pre-meal Admelog to 6 units   Will continue with Lantus as ordered (home dose, 24 units)  Continue with correctional scales as ordered for now   See last progress note for full plan of care   Discharge planning likely basal / oral regimen but pending lactate     Endocrine      CAPILLARY BLOOD GLUCOSE      POCT Blood Glucose.: 75 mg/dL (26 Mar 2022 12:10)  POCT Blood Glucose.: 167 mg/dL (26 Mar 2022 08:19)  POCT Blood Glucose.: 323 mg/dL (25 Mar 2022 21:29)  POCT Blood Glucose.: 309 mg/dL (25 Mar 2022 17:18)      03-26    141  |  99  |  9   ----------------------------<  119<H>  2.9<LL>   |  27  |  0.47<L>    Ca    9.6      26 Mar 2022 10:18  Phos  3.0     03-26  Mg     2.70     03-26    TPro  7.7  /  Alb  4.6  /  TBili  0.7  /  DBili  x   /  AST  99<H>  /  ALT  92<H>  /  AlkPhos  87  03-26      MEDICATIONS  (STANDING):  amLODIPine   Tablet 10 milliGRAM(s) Oral daily  aspirin  chewable 81 milliGRAM(s) Oral daily  atorvastatin 20 milliGRAM(s) Oral at bedtime  cyanocobalamin 1000 MICROGram(s) Oral daily  dextrose 40% Gel 15 Gram(s) Oral once  dextrose 50% Injectable 25 Gram(s) IV Push once  dextrose 50% Injectable 12.5 Gram(s) IV Push once  dextrose 50% Injectable 25 Gram(s) IV Push once  insulin glargine Injectable (LANTUS) 24 Unit(s) SubCutaneous at bedtime  insulin lispro (ADMELOG) corrective regimen sliding scale   SubCutaneous three times a day before meals  insulin lispro (ADMELOG) corrective regimen sliding scale   SubCutaneous at bedtime  insulin lispro Injectable (ADMELOG) 8 Unit(s) SubCutaneous three times a day before meals  lactated ringers. 1000 milliLiter(s) (75 mL/Hr) IV Continuous <Continuous>  magnesium oxide 400 milliGRAM(s) Oral three times a day with meals  pantoprazole    Tablet 40 milliGRAM(s) Oral before breakfast  potassium chloride  10 mEq/100 mL IVPB 10 milliEquivalent(s) IV Intermittent every 1 hour  sodium chloride 0.9%. 1000 milliLiter(s) (75 mL/Hr) IV Continuous <Continuous>

## 2022-03-26 NOTE — DISCHARGE NOTE PROVIDER - CARE PROVIDER_API CALL
Christiano Emanuel (DO)  Cardiology  [address] [place] [address]  Phone: [phone]  Follow Up Time: 2 weeks    PCPDr.  Phone: (   )    -  Fax: (   )    -  Follow Up Time:

## 2022-03-26 NOTE — DISCHARGE NOTE PROVIDER - NSDCMRMEDTOKEN_GEN_ALL_CORE_FT
albuterol 90 mcg/inh inhalation aerosol: 2 puff(s) inhaled every 6 hours as needed for shortness of breath and/or wheezing   amLODIPine 10 mg oral tablet: 1 tab(s) orally once a day  aspirin 81 mg oral tablet: 1 tab(s) orally once a day  Januvia 100 mg oral tablet: 1 tab(s) orally once a day  Lantus Solostar Pen 100 units/mL subcutaneous solution: 24 unit(s) subcutaneous once a day (at bedtime)  Lipitor 20 mg oral tablet: 1 tab(s) orally once a day (at bedtime)  metFORMIN 1000 mg oral tablet: 1 tab(s) orally 2 times a day   pantoprazole 40 mg oral delayed release tablet: 1 tab(s) orally once a day (before a meal)  Physical Therapy: 1 application orally once a day, Physical Therapy as directed  Vitamin B12 1000 mcg oral tablet: 1 tab(s) orally once a day   albuterol 90 mcg/inh inhalation aerosol: 2 puff(s) inhaled every 6 hours as needed for shortness of breath and/or wheezing   amLODIPine 10 mg oral tablet: 1 tab(s) orally once a day  aspirin 81 mg oral tablet: 1 tab(s) orally once a day  Januvia 100 mg oral tablet: 1 tab(s) orally once a day  Lantus Solostar Pen 100 units/mL subcutaneous solution: 24 unit(s) subcutaneous once a day (at bedtime)  Lipitor 20 mg oral tablet: 1 tab(s) orally once a day (at bedtime)  metFORMIN 1000 mg oral tablet: 1 tab(s) orally 2 times a day   pantoprazole 40 mg oral delayed release tablet: 1 tab(s) orally once a day (before a meal)  Physical Therapy: 1 application orally once a day, Physical Therapy as directed  Mya Walker: 1 application orally once a day. Mya Walker as directed  Vitamin B12 1000 mcg oral tablet: 1 tab(s) orally once a day   acetaminophen 325 mg oral tablet: 2 tab(s) orally every 6 hours, As needed, Temp greater or equal to 38C (100.4F), Mild Pain (1 - 3)  albuterol 90 mcg/inh inhalation aerosol: 2 puff(s) inhaled every 6 hours as needed for shortness of breath and/or wheezing   amLODIPine 10 mg oral tablet: 1 tab(s) orally once a day  aspirin 81 mg oral tablet: 1 tab(s) orally once a day  Januvia 100 mg oral tablet: 1 tab(s) orally once a day  Lantus Solostar Pen 100 units/mL subcutaneous solution: 24 unit(s) subcutaneous once a day (at bedtime)  Lipitor 20 mg oral tablet: 1 tab(s) orally once a day (at bedtime)  melatonin 3 mg oral tablet: 1 tab(s) orally once a day (at bedtime), As needed, Insomnia  metFORMIN 1000 mg oral tablet: 1 tab(s) orally 2 times a day (Please do not resume until 03/29/22)  pantoprazole 40 mg oral delayed release tablet: 1 tab(s) orally once a day (before a meal)  Physical Therapy: 1 application orally once a day, Physical Therapy as directed  Rolling Walker: 1 application orally once a day. Rolling Walker as directed  Vitamin B12 1000 mcg oral tablet: 1 tab(s) orally once a day

## 2022-03-26 NOTE — DISCHARGE NOTE PROVIDER - HOSPITAL COURSE
71F, Uzbek-speaking, with hx of gastric ulcer, T2DM, HTN, HLD who presents with SANDERS and chest pressure r/o'd for ACS, s/p cath showing mild nonosbtructive CAD. Also with Type B lactic acidosis likely from albuterol/metformin use on admission, lactate 7.0 this AM w/ worsening abd pain/distention    1. Shortness of breath.   Acute on chronic SANDERS associated with chest pressure  - admitted for similar complaints in 10/2021 (TTE/NST workup unremarkable)  - TTE 10/2021: EF 68%, normal LV systolic function, mild diastolic dysfunction, mild TR  - Repeat TTE ordered and can be obtained outpatient with cardiologist per cardiology  - trops negative, EKG without ischemic findings r/o'd for ACS  - CT chest with indeterminate GGO   - appreciate cards eval - rec cath given risk factors   s/p cath 3/25 (mild non-obstructive CAD)  - c/w asa, statin, monitor on tele  - no objection d/c per cards.  - Outpatient pcp and cardiology f/u in 1-2 weeks    2. Abdominal pain.    - CT findings notable for diffuse thickening or urinary bladder, however patient has no urinary symptoms, UA negative, PVR wnl on admission  - flex sig 10/20 showed biopsy confirmed mild proctitis, although no current symptoms or imaging evidence of this  - lactate 7.0, now normalized  abd distention w/ pain, repeat CT A/P w/ oral and IV contrast ____________    3. Transaminitis.   -Mildly elevated LFTs AST/ALT 71/62  - stable from previous LFT levels and w/ imaging evidence of NAFLD  - RUQ sono: contracted gallbladder, hepatic steatosis   - trended LFTs.    4. High serum lactate.   Lactate: 4 --> 3.6 --> 2.5 -> 7.0 --> 3.2 --> 1.6  - likely type B from albuterol/metformin/decreased hepatic clearance given steatosis, these medications discontinued in hospital  - CTAP without acute pathology on admission, repeating today given new pain ________  - no focal s/s of infection, BCx NTD, BPs wnl.    5. Type 2 diabetes mellitus.   -A1C 8.4%  - home regimen: Lantus 24U qhs, metformin, glipizide, januvia  - hold home oral agents inpatient  - now with prednisone induced hyperglycemia given as cath premedication  - appreciate endocrine recs: Lantus 24U qhs + 8U TID qac, follow up further recs, will resume metformin on discharge if lactate normalizes   - FS/SSI qac and hs + carb restricted diet.  - As lactate now normalized, per endocrinology recommendations, Will continue with Lantus as ordered (home dose, 24 units), Can resume Metformin on 3/29 givent recent cath, and Can resume Januvia upon discharge   - PCP f/u for further management    6. HTN (hypertension).   -Stable  - c/w amlodipine  - monitor vital signs.    7. Hyperlipidemia.   -Stable  - c/w atorvastatin.    8. Ground glass opacity present on imaging of lung.   -RUL GGO noted on CT chest   - Right upper lobe 1.3 cm ground-glass nodular opacity; indeterminate, but may be infectious/inflammatory etiology.  - repeat CT chest in 3 months to assess for resolution.    9. Prophylactic measure.   -DVT Prophylaxis: SCDs, IMPROV score 1  DIET: CC    PT: Home with outpt PT. PT script given. Rolling walker script given to .    Christiano garcia   71F, Croatian-speaking, with hx of gastric ulcer, T2DM, HTN, HLD who presents with SANDERS and chest pressure r/o'd for ACS, s/p cath showing mild nonosbtructive CAD. Also with Type B lactic acidosis likely from albuterol/metformin use on admission, lactate 7.0 this AM w/ worsening abd pain/distention    1. Shortness of breath.   Acute on chronic SANDERS associated with chest pressure  - admitted for similar complaints in 10/2021 (TTE/NST workup unremarkable)  - TTE 10/2021: EF 68%, normal LV systolic function, mild diastolic dysfunction, mild TR  - Repeat TTE ordered and can be obtained outpatient with cardiologist per cardiology  - trops negative, EKG without ischemic findings r/o'd for ACS  - CT chest with indeterminate GGO   - appreciate cards eval - rec cath given risk factors   s/p cath 3/25 (mild non-obstructive CAD)  - c/w asa, statin, monitor on tele  - no objection d/c per cards.  - Outpatient pcp and cardiology f/u in 1-2 weeks    2. Abdominal pain.    - CT findings notable for diffuse thickening or urinary bladder, however patient has no urinary symptoms, UA negative, PVR wnl on admission  - flex sig 10/20 showed biopsy confirmed mild proctitis, although no current symptoms or imaging evidence of this  - lactate 7.0, now normalized  abd distention w/ pain, repeat CT A/P w/ oral and IV contrast with no evidence of any cause for abdominal pain, with underdistended bladder with re-demonstrated bladder wall thickening    3. Transaminitis.   -Mildly elevated LFTs AST/ALT 71/62  - stable from previous LFT levels and w/ imaging evidence of NAFLD  - RUQ sono: contracted gallbladder, hepatic steatosis   - trended LFTs.    4. High serum lactate.   Lactate: 4 --> 3.6 --> 2.5 -> 7.0 --> 3.2 --> 1.6  - Lactate now normalized  - likely type B from albuterol/metformin/decreased hepatic clearance given steatosis, these medications discontinued in hospital  - CTAP without acute pathology on admission, repeat showing no further evidence of any acute pathology for abdominal pain  - no focal s/s of infection, BCx NTD, BPs wnl.    5. Type 2 diabetes mellitus.   -A1C 8.4%  - home regimen: Lantus 24U qhs, metformin, glipizide, januvia  - hold home oral agents inpatient  - now with prednisone induced hyperglycemia given as cath premedication  - appreciate endocrine recs: Lantus 24U qhs + 8U TID qac, follow up further recs, will resume metformin on discharge if lactate normalizes   - FS/SSI qac and hs + carb restricted diet.  - As lactate now normalized, per endocrinology recommendations, Will continue with Lantus as ordered (home dose, 24 units), Can resume Metformin on 3/29 givent recent cath, and Can resume Januvia upon discharge   - PCP f/u for further management    6. HTN (hypertension).   -Stable  - c/w amlodipine  - monitor vital signs.    7. Hyperlipidemia.   -Stable  - c/w atorvastatin.    8. Ground glass opacity present on imaging of lung.   -RUL GGO noted on CT chest   - Right upper lobe 1.3 cm ground-glass nodular opacity; indeterminate, but may be infectious/inflammatory etiology.  - repeat CT chest in 3 months to assess for resolution.    9. Prophylactic measure.   -DVT Prophylaxis: SCDs, IMPROV score 1  DIET: CC    PT: Home with outpt PT. PT script given. Rolling walker script given to .    Patient seen and evaluated, no acute somatic complaints. Reviewed discharge medications with patient; All new medications requiring new prescriptions were sent to the pharmacy of patient's choice. Reviewed need for prescription for previous home medications and new prescriptions sent if requested. Medically cleared/stable for discharge as per Dr. Rivas on 03/27/2022 with close outpatient follow up within 1-2 weeks of discharge. Patient understands and agrees with plan of care.

## 2022-03-26 NOTE — PROVIDER CONTACT NOTE (OTHER) - ASSESSMENT
Pt is A&Ox4, Armenian speaking. pt instructed to not take any medications form home, only administered medications from [facility].

## 2022-03-26 NOTE — DISCHARGE NOTE PROVIDER - NSDCCPCAREPLAN_GEN_ALL_CORE_FT
PRINCIPAL DISCHARGE DIAGNOSIS  Diagnosis: Abdominal pain  Assessment and Plan of Treatment: You came into the hosptial with abdominal pain and distension. This was likely due to a sudden response of fatty liver disease and a contraction of your gallbladder resulted in crampy abdominal pain as evidenced by elevated liver enzymes which could also be medication induced. Your medications that would cause this were temporarily held until your labs returned back to normal. Please continue to take your medications as prescribed and follow up with your pcp in 1-2 weeks. Should symptoms worsen, consider seeing a gastroenterologist as well.      SECONDARY DISCHARGE DIAGNOSES  Diagnosis: Type 2 diabetes mellitus  Assessment and Plan of Treatment: Please take your medications as prescribed. Please do not resume your Metformin until 03/29/22 as this could worsen your abdominal pain in the short term per endocrinology who was following you. Continue a consistent carbohydrate diet (Meaning eating the same amount of carbohydrates at the same time each day). Monitor blood glucose levels four times a day before meals and at bedtime. Record blood sugars and bring to outpatient providers appointment in order to be reviewed by your doctor for management modifications. If your sugars are more than 400 or less than 70 you should contact your Primary Care Physician immediately. Monitor for signs/symptoms of low blood glucose, such as, dizziness, altered mental status, or cool/clammy skin. In addition, monitor for signs/symptoms of high blood glucose, such as, feeling hot, dry, fatigued, or with increased thirst/urination.  Exercise daily for at least 30 minutes and weight loss.  Follow up with your primary care physician and endocrinologist for routine Hemoglobin A1C checks and management.  Follow up with your ophthalmologist for routine yearly vision exams. Make regular podiatry appointments in order to have feet checked for wounds.       Diagnosis: Ground glass opacity present on imaging of lung  Assessment and Plan of Treatment: There was a Right upper lady lung nodule/opacity seen on a CT scan of your chest. Please follow up with your PCP to discuss repeating the CT scan in 3 months to further evaluate it for worsening or resolution.    Diagnosis: Shortness of breath  Assessment and Plan of Treatment: You came in with difficulty breathing. Cardiology workup showed mild evidence of heart damage or ischemia of heart tissue and cardiology was following.  You had a catheterization of the heart on 03/25/22 which showed this. Cardiology did not advise further invasive management and wants to see you outpatient for further monitoring. Please take your medications as prescribed.

## 2022-03-27 ENCOUNTER — TRANSCRIPTION ENCOUNTER (OUTPATIENT)
Age: 72
End: 2022-03-27

## 2022-03-27 VITALS
SYSTOLIC BLOOD PRESSURE: 152 MMHG | OXYGEN SATURATION: 99 % | HEART RATE: 99 BPM | RESPIRATION RATE: 19 BRPM | TEMPERATURE: 97 F | DIASTOLIC BLOOD PRESSURE: 84 MMHG

## 2022-03-27 LAB
ALBUMIN SERPL ELPH-MCNC: 4 G/DL — SIGNIFICANT CHANGE UP (ref 3.3–5)
ALP SERPL-CCNC: 87 U/L — SIGNIFICANT CHANGE UP (ref 40–120)
ALT FLD-CCNC: 100 U/L — HIGH (ref 4–33)
ANION GAP SERPL CALC-SCNC: 11 MMOL/L — SIGNIFICANT CHANGE UP (ref 7–14)
APPEARANCE UR: CLEAR — SIGNIFICANT CHANGE UP
AST SERPL-CCNC: 103 U/L — HIGH (ref 4–32)
BACTERIA # UR AUTO: NEGATIVE — SIGNIFICANT CHANGE UP
BILIRUB SERPL-MCNC: 0.8 MG/DL — SIGNIFICANT CHANGE UP (ref 0.2–1.2)
BILIRUB UR-MCNC: NEGATIVE — SIGNIFICANT CHANGE UP
BUN SERPL-MCNC: 8 MG/DL — SIGNIFICANT CHANGE UP (ref 7–23)
CALCIUM SERPL-MCNC: 8.7 MG/DL — SIGNIFICANT CHANGE UP (ref 8.4–10.5)
CHLORIDE SERPL-SCNC: 104 MMOL/L — SIGNIFICANT CHANGE UP (ref 98–107)
CO2 SERPL-SCNC: 25 MMOL/L — SIGNIFICANT CHANGE UP (ref 22–31)
COLOR SPEC: SIGNIFICANT CHANGE UP
CREAT SERPL-MCNC: 0.48 MG/DL — LOW (ref 0.5–1.3)
DIFF PNL FLD: NEGATIVE — SIGNIFICANT CHANGE UP
EGFR: 101 ML/MIN/1.73M2 — SIGNIFICANT CHANGE UP
EPI CELLS # UR: 1 /HPF — SIGNIFICANT CHANGE UP (ref 0–5)
GLUCOSE BLDC GLUCOMTR-MCNC: 180 MG/DL — HIGH (ref 70–99)
GLUCOSE BLDC GLUCOMTR-MCNC: 185 MG/DL — HIGH (ref 70–99)
GLUCOSE SERPL-MCNC: 167 MG/DL — HIGH (ref 70–99)
GLUCOSE UR QL: ABNORMAL
HCT VFR BLD CALC: 36.9 % — SIGNIFICANT CHANGE UP (ref 34.5–45)
HGB BLD-MCNC: 11.9 G/DL — SIGNIFICANT CHANGE UP (ref 11.5–15.5)
HYALINE CASTS # UR AUTO: 0 /LPF — SIGNIFICANT CHANGE UP (ref 0–7)
KETONES UR-MCNC: NEGATIVE — SIGNIFICANT CHANGE UP
LACTATE SERPL-SCNC: 1.6 MMOL/L — SIGNIFICANT CHANGE UP (ref 0.5–2)
LEUKOCYTE ESTERASE UR-ACNC: ABNORMAL
MAGNESIUM SERPL-MCNC: 2 MG/DL — SIGNIFICANT CHANGE UP (ref 1.6–2.6)
MCHC RBC-ENTMCNC: 23.9 PG — LOW (ref 27–34)
MCHC RBC-ENTMCNC: 32.2 GM/DL — SIGNIFICANT CHANGE UP (ref 32–36)
MCV RBC AUTO: 74.1 FL — LOW (ref 80–100)
NITRITE UR-MCNC: NEGATIVE — SIGNIFICANT CHANGE UP
NRBC # BLD: 0 /100 WBCS — SIGNIFICANT CHANGE UP
NRBC # FLD: 0 K/UL — SIGNIFICANT CHANGE UP
PH UR: 7 — SIGNIFICANT CHANGE UP (ref 5–8)
PHOSPHATE SERPL-MCNC: 3.4 MG/DL — SIGNIFICANT CHANGE UP (ref 2.5–4.5)
PLATELET # BLD AUTO: 341 K/UL — SIGNIFICANT CHANGE UP (ref 150–400)
POTASSIUM SERPL-MCNC: 4.2 MMOL/L — SIGNIFICANT CHANGE UP (ref 3.5–5.3)
POTASSIUM SERPL-SCNC: 4.2 MMOL/L — SIGNIFICANT CHANGE UP (ref 3.5–5.3)
PROT SERPL-MCNC: 6.8 G/DL — SIGNIFICANT CHANGE UP (ref 6–8.3)
PROT UR-MCNC: NEGATIVE — SIGNIFICANT CHANGE UP
RBC # BLD: 4.98 M/UL — SIGNIFICANT CHANGE UP (ref 3.8–5.2)
RBC # FLD: 16.3 % — HIGH (ref 10.3–14.5)
RBC CASTS # UR COMP ASSIST: 0 /HPF — SIGNIFICANT CHANGE UP (ref 0–4)
SODIUM SERPL-SCNC: 140 MMOL/L — SIGNIFICANT CHANGE UP (ref 135–145)
SP GR SPEC: 1 — SIGNIFICANT CHANGE UP (ref 1–1.05)
UROBILINOGEN FLD QL: SIGNIFICANT CHANGE UP
WBC # BLD: 11.31 K/UL — HIGH (ref 3.8–10.5)
WBC # FLD AUTO: 11.31 K/UL — HIGH (ref 3.8–10.5)
WBC UR QL: 2 /HPF — SIGNIFICANT CHANGE UP (ref 0–5)

## 2022-03-27 PROCEDURE — 74177 CT ABD & PELVIS W/CONTRAST: CPT | Mod: 26

## 2022-03-27 PROCEDURE — 99239 HOSP IP/OBS DSCHRG MGMT >30: CPT

## 2022-03-27 RX ORDER — METFORMIN HYDROCHLORIDE 850 MG/1
1 TABLET ORAL
Qty: 60 | Refills: 0
Start: 2022-03-27

## 2022-03-27 RX ORDER — ACETAMINOPHEN 500 MG
2 TABLET ORAL
Qty: 0 | Refills: 0 | DISCHARGE
Start: 2022-03-27

## 2022-03-27 RX ORDER — LANOLIN ALCOHOL/MO/W.PET/CERES
1 CREAM (GRAM) TOPICAL
Qty: 0 | Refills: 0 | DISCHARGE
Start: 2022-03-27

## 2022-03-27 RX ADMIN — Medication 6 UNIT(S): at 08:51

## 2022-03-27 RX ADMIN — Medication 81 MILLIGRAM(S): at 11:14

## 2022-03-27 RX ADMIN — Medication 1: at 12:11

## 2022-03-27 RX ADMIN — AMLODIPINE BESYLATE 10 MILLIGRAM(S): 2.5 TABLET ORAL at 05:52

## 2022-03-27 RX ADMIN — Medication 1: at 08:48

## 2022-03-27 RX ADMIN — PANTOPRAZOLE SODIUM 40 MILLIGRAM(S): 20 TABLET, DELAYED RELEASE ORAL at 05:51

## 2022-03-27 RX ADMIN — PREGABALIN 1000 MICROGRAM(S): 225 CAPSULE ORAL at 11:15

## 2022-03-27 RX ADMIN — Medication 6 UNIT(S): at 12:11

## 2022-03-27 NOTE — DISCHARGE NOTE NURSING/CASE MANAGEMENT/SOCIAL WORK - PATIENT PORTAL LINK FT
You can access the FollowMyHealth Patient Portal offered by [facility] by registering at the following website: [email or web address]. By joining [facility]’s FollowMyHealth portal, you will also be able to view your health information using other applications (apps) compatible with our system.

## 2022-03-27 NOTE — PROGRESS NOTE ADULT - PROBLEM SELECTOR PLAN 2
Resolved   - CT findings notable for diffuse thickening or urinary bladder, however patient has no urinary symptoms, UA negative, PVR wnl  - flex sig 10/20 showed biopsy confirmed mild proctitis, although no current symptoms or imaging evidence of this  - lactate downtrending, and abdominal pain resolved  - US pelvis: small amount of fluid in endometrium, normal thickness  - RUQ sono: contracted gallbadder, hepatic steatosis   - has hx of gastric ulcer - c/w pantoprazole
Resolved   - CT findings notable for diffuse thickening or urinary bladder, however patient has no urinary symptoms, UA negative, will check PVR  - flex sig 10/20 showed biopsy confirmed mild proctitis, although no current symptoms or imaging evidence of this  - lactate downtrending, and abdominal pain resolved, will hold off CTA  - US pelvis: small amount of fluid in endometrium, normal thickness  - RUQ sono: contracted gallbadder, hepatic steatosis   - has hx of gastric ulcer - c/w pantoprazole
- repeat CT unrevelaing, lactate wnl, abd pain resolved
Resolved   - CT findings notable for diffuse thickening or urinary bladder, however patient has no urinary symptoms, UA negative, will check PVR  - flex sig 10/20 showed biopsy confirmed mild proctitis, although no current symptoms or imaging evidence of this  - lactate downtrending, and abdominal pain resolved  - US pelvis: small amount of fluid in endometrium, normal thickness  - RUQ sono: contracted gallbadder, hepatic steatosis   - has hx of gastric ulcer - c/w pantoprazole
- CT findings notable for diffuse thickening or urinary bladder, however patient has no urinary symptoms, UA negative, PVR wnl on admission  - flex sig 10/20 showed biopsy confirmed mild proctitis, although no current symptoms or imaging evidence of this  - today lactate 7.0, abd distention w/ pain, repeat CT A/P w/ oral and IV contrast

## 2022-03-27 NOTE — PROGRESS NOTE ADULT - PROBLEM SELECTOR PLAN 3
Mildly elevated LFTs AST/ALT 71/62  - stable from previous LFT levels and w/ imaging evidence of NAFLD  - RUQ sono: contracted gallbladder, hepatic steatosis   - trend LFTs
Mildly elevated LFTs AST/ALT 71/62  - stable from previous LFT levels and w/ imaging evidence of NAFLD  - RUQ sono: contracted gallbladder, hepatic steatosis   - trend LFTs
Mildly elevated LFTs AST/ALT 71/62  - stable from previous LFT levels and w/ imaging evidence of NAFLD  - RUQ sono: contracted gallbadder, hepatic steatosis   - trend LFTs
Mildly elevated LFTs AST/ALT 71/62  - stable from previous LFT levels and w/ imaging evidence of NAFLD  - RUQ sono: contracted gallbladder, hepatic steatosis   - trend LFTs
Mildly elevated LFTs AST/ALT 71/62  - stable from previous LFT levels and w/ imaging evidence of NAFLD  - RUQ sono: contracted gallbladder, hepatic steatosis   - trend LFTs

## 2022-03-27 NOTE — PROGRESS NOTE ADULT - PROVIDER SPECIALTY LIST ADULT
Cardiology
Endocrinology
Hospitalist

## 2022-03-27 NOTE — PROGRESS NOTE ADULT - SUBJECTIVE AND OBJECTIVE BOX
Patient is a 71y old  Female who presents with a chief complaint of SOB on exertion/abdominal pain (27 Mar 2022 12:30)      SUBJECTIVE / OVERNIGHT EVENTS:    Patient seen and examined at bedside. Pain improved abdomen, tolerating po intake    MEDICATIONS  (STANDING):  amLODIPine   Tablet 10 milliGRAM(s) Oral daily  aspirin  chewable 81 milliGRAM(s) Oral daily  atorvastatin 20 milliGRAM(s) Oral at bedtime  cyanocobalamin 1000 MICROGram(s) Oral daily  dextrose 40% Gel 15 Gram(s) Oral once  dextrose 50% Injectable 25 Gram(s) IV Push once  dextrose 50% Injectable 12.5 Gram(s) IV Push once  dextrose 50% Injectable 25 Gram(s) IV Push once  insulin glargine Injectable (LANTUS) 24 Unit(s) SubCutaneous at bedtime  insulin lispro (ADMELOG) corrective regimen sliding scale   SubCutaneous three times a day before meals  insulin lispro (ADMELOG) corrective regimen sliding scale   SubCutaneous at bedtime  insulin lispro Injectable (ADMELOG) 6 Unit(s) SubCutaneous three times a day before meals  pantoprazole    Tablet 40 milliGRAM(s) Oral before breakfast  sodium chloride 0.9%. 1000 milliLiter(s) (75 mL/Hr) IV Continuous <Continuous>    MEDICATIONS  (PRN):  acetaminophen     Tablet .. 650 milliGRAM(s) Oral every 6 hours PRN Temp greater or equal to 38C (100.4F), Mild Pain (1 - 3)  melatonin 3 milliGRAM(s) Oral at bedtime PRN Insomnia      Vital Signs Last 24 Hrs  T(C): 36.7 (27 Mar 2022 05:51), Max: 36.7 (27 Mar 2022 05:51)  T(F): 98.1 (27 Mar 2022 05:51), Max: 98.1 (27 Mar 2022 05:51)  HR: 97 (27 Mar 2022 05:51) (92 - 97)  BP: 134/78 (27 Mar 2022 05:51) (134/78 - 153/90)  BP(mean): --  RR: 19 (27 Mar 2022 05:51) (19 - 20)  SpO2: 98% (27 Mar 2022 05:51) (98% - 100%)  CAPILLARY BLOOD GLUCOSE      POCT Blood Glucose.: 185 mg/dL (27 Mar 2022 12:00)  POCT Blood Glucose.: 180 mg/dL (27 Mar 2022 08:22)  POCT Blood Glucose.: 211 mg/dL (26 Mar 2022 21:58)  POCT Blood Glucose.: 221 mg/dL (26 Mar 2022 17:33)    I&O's Summary      PHYSICAL EXAM:  Vital Signs Last 24 Hrs  T(C): 36.7 (22 @ 05:51)  T(F): 98.1 (22 @ 05:51), Max: 98.1 (22 @ 05:51)  HR: 97 (22 @ 05:51) (92 - 97)  BP: 134/78 (22 @ 05:51)  BP(mean): --  RR: 19 (22 @ 05:51) (19 - 20)  SpO2: 98% (22 @ 05:51) (98% - 100%)  Wt(kg): --    Constitutional: NAD, awake and alert  EYES: EOMI  ENT:  Normal Hearing, no tonsillar exudates   Neck: Soft and supple , no thyromegaly   Respiratory: Breath sounds are clear bilaterally, No wheezing, rales or rhonchi  Cardiovascular: S1 and S2, regular rate and rhythm, no Murmurs, gallops or rubs, no JVD,    Gastrointestinal: Bowel Sounds present, soft, nontender, nondistended, no guarding, no rebound  Extremities: No cyanosis or clubbing; warm to touch  Vascular: 2+ peripheral pulses lower ex  Neurological: No focal deficits, CN II-XII intact bilaterally, sensation to light touch intact in all extremities. Pupils are equally reactive to light and symmetrical in size.   Musculoskeletal: 5/5 strength b/l upper and lower extremities; no joint swelling.  Skin: No rashes  Psych: no depression or anhedonia, AAOx3  HEME: no bruises, no nose bleeds      LABS:                        11.9   11.31 )-----------( 341      ( 27 Mar 2022 06:09 )             36.9         140  |  104  |  8   ----------------------------<  167<H>  4.2   |  25  |  0.48<L>    Ca    8.7      27 Mar 2022 06:09  Phos  3.4       Mg     2.00         TPro  6.8  /  Alb  4.0  /  TBili  0.8  /  DBili  x   /  AST  103<H>  /  ALT  100<H>  /  AlkPhos  87            Urinalysis Basic - ( 27 Mar 2022 10:37 )    Color: Light Yellow / Appearance: Clear / S.005 / pH: x  Gluc: x / Ketone: Negative  / Bili: Negative / Urobili: <2 mg/dL   Blood: x / Protein: Negative / Nitrite: Negative   Leuk Esterase: Small / RBC: 0 /HPF / WBC 2 /HPF   Sq Epi: x / Non Sq Epi: 1 /HPF / Bacteria: Negative        RADIOLOGY & ADDITIONAL TESTS:    Imaging Personally Reviewed:    Consultant(s) Notes Reviewed:      Care Discussed with Consultants/Other Providers:

## 2022-03-27 NOTE — PROGRESS NOTE ADULT - PROBLEM SELECTOR PROBLEM 1
Type 2 diabetes mellitus
Shortness of breath

## 2022-03-27 NOTE — PROGRESS NOTE ADULT - REASON FOR ADMISSION
SOB on exertion/abdominal pain

## 2022-03-27 NOTE — DISCHARGE NOTE NURSING/CASE MANAGEMENT/SOCIAL WORK - NSDCPEFALRISK_GEN_ALL_CORE
For information on Fall & Injury Prevention, visit: [email or web address] OR  [email or web address] OR  https://www.cdc.gov/steadi/patient.html

## 2022-03-27 NOTE — PROGRESS NOTE ADULT - PROBLEM SELECTOR PROBLEM 8
Prophylactic measure
Ground glass opacity present on imaging of lung

## 2022-03-27 NOTE — PROGRESS NOTE ADULT - PROBLEM SELECTOR PLAN 7
Stable  - c/w atorvastatin

## 2022-03-27 NOTE — CHART NOTE - NSCHARTNOTEFT_GEN_A_CORE
Endocrine   Called by primary team for discharge recommendations     Lactate normalized   Angiogram was 3/26   Glucose stable     Will continue with Lantus as ordered (home dose, 24 units)  Can resume Metformin on 3/29   Can resume Januvia upon discharge     Endocrine      CAPILLARY BLOOD GLUCOSE      POCT Blood Glucose.: 180 mg/dL (27 Mar 2022 08:22)  POCT Blood Glucose.: 211 mg/dL (26 Mar 2022 21:58)  POCT Blood Glucose.: 221 mg/dL (26 Mar 2022 17:33)  POCT Blood Glucose.: 75 mg/dL (26 Mar 2022 12:10)    03-27    140  |  104  |  8   ----------------------------<  167<H>  4.2   |  25  |  0.48<L>    Ca    8.7      27 Mar 2022 06:09  Phos  3.4     03-27  Mg     2.00     03-27    TPro  6.8  /  Alb  4.0  /  TBili  0.8  /  DBili  x   /  AST  103<H>  /  ALT  100<H>  /  AlkPhos  87  03-27    Lactate, Blood: 1.6 mmol/L (03-27-22 @ 06:09)    MEDICATIONS  (STANDING):  amLODIPine   Tablet 10 milliGRAM(s) Oral daily  aspirin  chewable 81 milliGRAM(s) Oral daily  atorvastatin 20 milliGRAM(s) Oral at bedtime  cyanocobalamin 1000 MICROGram(s) Oral daily  dextrose 40% Gel 15 Gram(s) Oral once  dextrose 50% Injectable 25 Gram(s) IV Push once  dextrose 50% Injectable 12.5 Gram(s) IV Push once  dextrose 50% Injectable 25 Gram(s) IV Push once  insulin glargine Injectable (LANTUS) 24 Unit(s) SubCutaneous at bedtime  insulin lispro (ADMELOG) corrective regimen sliding scale   SubCutaneous three times a day before meals  insulin lispro (ADMELOG) corrective regimen sliding scale   SubCutaneous at bedtime  insulin lispro Injectable (ADMELOG) 6 Unit(s) SubCutaneous three times a day before meals  pantoprazole    Tablet 40 milliGRAM(s) Oral before breakfast  sodium chloride 0.9%. 1000 milliLiter(s) (75 mL/Hr) IV Continuous <Continuous> Endocrine   Called by primary team for discharge recommendations     Lactate normalized   Angiogram was 3/26   Glucose stable     Will continue with Lantus as ordered (home dose, 24 units)  Can resume Metformin on 3/29   Can resume Januvia upon discharge   Follow up with PCP upon discharge     Endocrine      CAPILLARY BLOOD GLUCOSE      POCT Blood Glucose.: 180 mg/dL (27 Mar 2022 08:22)  POCT Blood Glucose.: 211 mg/dL (26 Mar 2022 21:58)  POCT Blood Glucose.: 221 mg/dL (26 Mar 2022 17:33)  POCT Blood Glucose.: 75 mg/dL (26 Mar 2022 12:10)    03-27    140  |  104  |  8   ----------------------------<  167<H>  4.2   |  25  |  0.48<L>    Ca    8.7      27 Mar 2022 06:09  Phos  3.4     03-27  Mg     2.00     03-27    TPro  6.8  /  Alb  4.0  /  TBili  0.8  /  DBili  x   /  AST  103<H>  /  ALT  100<H>  /  AlkPhos  87  03-27    Lactate, Blood: 1.6 mmol/L (03-27-22 @ 06:09)    MEDICATIONS  (STANDING):  amLODIPine   Tablet 10 milliGRAM(s) Oral daily  aspirin  chewable 81 milliGRAM(s) Oral daily  atorvastatin 20 milliGRAM(s) Oral at bedtime  cyanocobalamin 1000 MICROGram(s) Oral daily  dextrose 40% Gel 15 Gram(s) Oral once  dextrose 50% Injectable 25 Gram(s) IV Push once  dextrose 50% Injectable 12.5 Gram(s) IV Push once  dextrose 50% Injectable 25 Gram(s) IV Push once  insulin glargine Injectable (LANTUS) 24 Unit(s) SubCutaneous at bedtime  insulin lispro (ADMELOG) corrective regimen sliding scale   SubCutaneous three times a day before meals  insulin lispro (ADMELOG) corrective regimen sliding scale   SubCutaneous at bedtime  insulin lispro Injectable (ADMELOG) 6 Unit(s) SubCutaneous three times a day before meals  pantoprazole    Tablet 40 milliGRAM(s) Oral before breakfast  sodium chloride 0.9%. 1000 milliLiter(s) (75 mL/Hr) IV Continuous <Continuous>

## 2022-03-27 NOTE — PROGRESS NOTE ADULT - PROBLEM SELECTOR PLAN 8
RUL GGO noted on CT chest   - Right upper lobe 1.3 cm ground-glass nodular opacity; indeterminate, but may be infectious/inflammatory etiology.  - repeat CT chest in 3 months to assess for resolution
DVT Prophylaxis: SCDs, IMPROV score 1  DIET: CC  DISPO: PT eval. Pending TTE, CT chest, cards eval, improvement of lactate.     Plan discussed with ACP and patient.
RUL GGO noted on CT chest   - Right upper lobe 1.3 cm ground-glass nodular opacity; indeterminate, but may be infectious/inflammatory etiology.  - repeat CT chest in 3 months to assess for resolution

## 2022-03-27 NOTE — PROGRESS NOTE ADULT - ASSESSMENT
71F, Sinhala-speaking, with hx of gastric ulcer, T2DM, HTN, HLD who presents with SANDERS and chest pressure r/o'd for ACS, s/p cath showing mild nonosbtructive CAD. Also with Type B lactic acidosis likely from albuterol/metformin use, now downtrending. 
71F, Georgian-speaking, with hx of gastric ulcer, T2DM, HTN, HLD who presents with SANDERS and chest pressure r/o'd for ACS, s/p cath showing mild nonosbtructive CAD. Also with Type B lactic acidosis likely from albuterol/metformin use on admission, lactate 7.0 this AM w/ worsening abd pain/distention
72 y/o Benagli speaking female  F w/ PMH T2DM, HTN, HLD, gastric ulcer (per patient) presents with SOB on exertion and chest pressure along with abdominal pain, s/p cardiac cath  endocrine called for DM assistance  a1c 8.5  home meds : metformin, Januvia and Lantus 24 units            1. DM   While inpatient  BG target 100-180 mg/dl, remains above goal  Please have Nutrition team meet with Patient and Family to educate on diabetic diet  Now that patient is off prednisone expect improvement in FS  For now cont Lantus 24 Units HS and Admelog 8 U TIDAC plus low scale before meals and bedtime  - Please put Hypoglycemia protocol in place   - Carb Consistent Diet       DC planning (regimen TBD) A1C 8.5  Home meds: Metformin, Januvia and Lantus   If lactate normalizes, then patient can restart metformin at home  Please contact endocrine team for final recs prior to discharge    2. HTN  goal BP in DM<130/80      3. HLD  pt is on lipitor 20mg       Rafaela Cintron DO            
71F, Divehi-speaking, with hx of gastric ulcer, T2DM, HTN, HLD who presents with SANDERS and chest pressure r/o'd for ACS, pending TTE. Found to have elevated lactate now downtrending. 
71F, Welsh-speaking, with hx of gastric ulcer, T2DM, HTN, HLD who presents with SANDERS and chest pressure r/o'd for ACS, s/p cath showing mild nonosbtructive CAD. Also with Type B lactic acidosis likely from albuterol/metformin use on admission, lactate 7.0 this AM w/ worsening abd pain/distention
71F, Romansh-speaking, with hx of gastric ulcer, T2DM, HTN, HLD who presents with SADNERS and chest pressure r/o'd for ACS, pending cath given risk factors. Found to have elevated lactate now downtrending.

## 2022-03-27 NOTE — PROGRESS NOTE ADULT - PROBLEM SELECTOR PLAN 4
Lactate: 4 --> 3.6 --> 2.5 -> 7.0  - likely type B from albuterol/metformin/decreased hepatic clearance given steatosis, these medications d/c in hospital  - CTAP without acute pathology on admission, repeating today given new pain  - no focal s/s of infection, BCx NTD, BPs wnl
Lactate: 4 --> 3.6 --> 2.5  - likely type B as patient taking PRN nebs at home/decreased hepatic clearance given steatosis   - CTAP without acute pathology   - no focal s/s of infection, BCx NTD, BPs wnl
Lactate: 4 --> 3.6 --> 2.5  - likely type B from albuterol/metformin/decreased hepatic clearance given steatosis   - CTAP without acute pathology   - no focal s/s of infection, BCx NTD, BPs wnl
Lactate: 4 --> 3.6 --> 2.5 -> 7.0 -> 1.6  - likely type B from albuterol/metformin/decreased hepatic clearance given steatosis, these medications d/c in hospital  - CTAP without acute pathology on admission and repeat wnl  - no focal s/s of infection, BCx NTD, BPs wnl
Lactate: 4 --> 3.6 --> 4.1 --> 3.7 --> 3  - BPs wnl, no focal s/s of infection   - CTAP without acute pathology   - possibly type B as patient taking PRN nebs at home / decreased hepatic clearance given steatosis   - c/w IVF, repeat lactate in AM

## 2022-03-27 NOTE — PROGRESS NOTE ADULT - SUBJECTIVE AND OBJECTIVE BOX
Cardiovascular Disease Progress Note    Overnight events: No acute events overnight.  Ms. Gupta is in no distress.   Otherwise review of systems negative    Objective Findings:  T(C): 36.7 (03-27-22 @ 05:51), Max: 36.8 (03-26-22 @ 14:19)  HR: 97 (03-27-22 @ 05:51) (91 - 97)  BP: 134/78 (03-27-22 @ 05:51) (126/71 - 153/90)  RR: 19 (03-27-22 @ 05:51) (19 - 20)  SpO2: 98% (03-27-22 @ 05:51) (98% - 100%)  Wt(kg): --  Daily     Daily       Physical Exam:  Gen: NAD; Patient resting comfortably  HEENT: EOMI, Normocephalic/ atraumatic  CV: RRR, normal S1 + S2, no m/r/g  Lungs:  Normal respiratory effort; clear to auscultation bilaterally  Abd: soft, non-tender; bowel sounds present  Ext: No edema; warm and well perfused    Telemetry: N/a    Laboratory Data:                        11.9   11.31 )-----------( 341      ( 27 Mar 2022 06:09 )             36.9     03-27    140  |  104  |  8   ----------------------------<  167<H>  4.2   |  25  |  0.48<L>    Ca    8.7      27 Mar 2022 06:09  Phos  3.4     03-27  Mg     2.00     03-27    TPro  6.8  /  Alb  4.0  /  TBili  0.8  /  DBili  x   /  AST  103<H>  /  ALT  100<H>  /  AlkPhos  87  03-27              Inpatient Medications:  MEDICATIONS  (STANDING):  amLODIPine   Tablet 10 milliGRAM(s) Oral daily  aspirin  chewable 81 milliGRAM(s) Oral daily  atorvastatin 20 milliGRAM(s) Oral at bedtime  cyanocobalamin 1000 MICROGram(s) Oral daily  dextrose 40% Gel 15 Gram(s) Oral once  dextrose 50% Injectable 25 Gram(s) IV Push once  dextrose 50% Injectable 12.5 Gram(s) IV Push once  dextrose 50% Injectable 25 Gram(s) IV Push once  insulin glargine Injectable (LANTUS) 24 Unit(s) SubCutaneous at bedtime  insulin lispro (ADMELOG) corrective regimen sliding scale   SubCutaneous three times a day before meals  insulin lispro (ADMELOG) corrective regimen sliding scale   SubCutaneous at bedtime  insulin lispro Injectable (ADMELOG) 6 Unit(s) SubCutaneous three times a day before meals  pantoprazole    Tablet 40 milliGRAM(s) Oral before breakfast  sodium chloride 0.9%. 1000 milliLiter(s) (75 mL/Hr) IV Continuous <Continuous>      Assessment:  71 year old woman with uncontrolled IDDM (HbA1c 8%), HTN, HLD, and gastric ulcer presents with chest pain and SOB concerning for angina.     Plan of Care:    #Exertional dyspnea-  Ms. Gupta has ruled out for ACS, however her symptoms are concerning for angina.  LHC 3/25 shows mild non-obstructive CAD  Continue ASA and statin.      #HTN-  BP controlled on the current regimen.       #Elevated Lactate  - Now resolved.   - management as per primary team            Over 25 minutes spent on total encounter; more than 50% of the visit was spent counseling and/or coordinating care by the attending physician.      Christiano Emanuel D.O.   Cardiovascular Disease  [phone]

## 2022-03-27 NOTE — PROGRESS NOTE ADULT - PROBLEM SELECTOR PLAN 1
Acute on chronic SANDERS associated with chest pressure  - admitted for similar complaints in 10/2021 (TTE/NST workup unremarkable)  - TTE 10/2021: EF 68%, normal LV systolic function, mild diastolic dysfunction, mild TR  - trops negative, EKG without ischemic findings r/o'd for ACS, CT chest with indeterminate GGO   - appreciate cards eval: given risk factors patient would benefit from angiogram (planned 3/25 @10AM, will premedicate with prednisone 50mg @9PM, 3AM, 9AM prior to procedure given shellfish allergy)  - monitor on telemetry
Acute on chronic SANDERS associated with chest pressure  - admitted for similar complaints in 10/2021 (TTE/NST workup unremarkable)  - TTE 10/2021: EF 68%, normal LV systolic function, mild diastolic dysfunction, mild TR  - trops negative, EKG without ischemic findings r/o'd for ACS  - CT chest with indeterminate GGO   - appreciate cards eval - rec cath given risk factors s/p cath 3/25 (mild non-obstructive CAD)  - c/w asa, statin, monitor on tele
Acute on chronic SANDERS associated with chest pressure  - admitted for similar complaints in 10/2021 (TTE/NST workup unremarkable)  - TTE 10/2021: EF 68%, normal LV systolic function, mild diastolic dysfunction, mild TR  - trops negative, EKG without ischemic findings r/o'd for ACS  - CT chest with indeterminate GGO   - appreciate cards eval - rec cath given risk factors s/p cath 3/25 (mild non-obstructive CAD)  - c/w asa, statin, monitor on tele  - no objection d/c per cards
Acute on chronic SANDERS associated with chest pressure  - admitted for similar complaints in 10/2021 (TTE/NST workup unremarkable)  - TTE 10/2021: EF 68%, normal LV systolic function, mild diastolic dysfunction, mild TR  - trops negative, EKG without ischemic findings r/o'd for ACS  - CT chest with indeterminate GGO   - appreciate cards eval - rec cath given risk factors s/p cath 3/25 (mild non-obstructive CAD)  - c/w asa, statin, monitor on tele  - no objection d/c per cards
Reports SANDERS associated with chest pressure  - admitted for similar complaints 10/2021 (TTE/NST workup unremarkable)  - TTE 10/2021: EF 68%, normal LV systolic function, mild diastolic dysfunction, mild TR  - trops negative, EKG without ischemic findings r/o'd for ACS  - cardiology Dr. Emanuel c/s - will evaluate to see if patient would benefit from further ischemic workup   - will obtain CT chest to r/o pulmonary etiologies of SOB  - monitor on telemetry

## 2022-03-27 NOTE — PROGRESS NOTE ADULT - PROBLEM SELECTOR PLAN 5
A1C 8.4%  - home regimen: Lantus 24U qhs, metformin, glipizide, januvia  - hold home oral agents inpatient  - now with prednisone induced hyperglycemia given as cath premedication  - appreciate endocrine recs: Lantus 24U qhs + 8U TID qac, follow up further recs, will resume metformin on discharge if lactate normalizes   - FS/SSI qac and hs + carb restricted diet
A1C 8.4%  - home regimen: Lantus 24U qhs, metformin, glipizide, januvia  - hold home oral agents inpatient  - now with prednisone induced hyperglycemia given as cath premedication  - appreciate endocrine recs: Lantus 24U qhs + 8U TID qac, follow up further recs, will resume metformin on discharge if lactate normalizes   - FS/SSI qac and hs + carb restricted diet
A1C 8.4%  - home regimen: Lantus 24U qhs, metformin, glipizide, januvia  - hold home oral agents, resume on discharge  - increase Lantus to 16U at bedtime, add Admelog 2U TID qac  - FS/SSI qac and hs + carb restricted diet
A1C 8.4%  - home regimen: Lantus 24U qhs, metformin, glipizide, januvia  - hold home oral agents inpatient  - now with prednisone induced hyperglycemia given as cath premedication  - appreciate endocrine recs: Lantus 24U qhs + 8U TID qac, follow up further recs, will resume metformin on discharge if lactate normalizes   - FS/SSI qac and hs + carb restricted diet
A1C 8.4%  - home regimen: Lantus 24U qhs, metformin, glipizide, januvia  - hold home oral agents, resume on discharge  - anticipate hyperglycemia with prednisone premedication for angiogram: increase to Lantus 24U, Admelog 4U TID qac, endocrine consulted as well - adjust regimen further per endo recs   - FS/SSI qac and hs + carb restricted diet

## 2022-03-27 NOTE — PROGRESS NOTE ADULT - PROBLEM SELECTOR PLAN 6
Stable  - c/w amlodipine  - monitor vital signs
Stable  - c/w amlodipine
Stable  - c/w amlodipine  - monitor vital signs

## 2022-03-27 NOTE — PROGRESS NOTE ADULT - PROBLEM SELECTOR PLAN 9
DVT Prophylaxis: SCDs, IMPROV score 1  DIET: CC  DISPO: Home pending CT A/P
DVT Prophylaxis: SCDs, IMPROV score 1  DIET: CC  DISPO: Home. Pending TTE post cath, blood glucose control.     Plan discussed with ACP, patient, patient's son Luis Felipe.
DVT Prophylaxis: SCDs, IMPROV score 1  DIET: CC  DISPO: Home. Pending cath.     Plan discussed with ACP, patient, patient's son Luis Felipe.
DVT Prophylaxis: SCDs, IMPROV score 1  DIET: CC  DISPO: Home today  DC planning time: 36 min in coordinating DC plan with patient and team.

## 2022-03-28 LAB
CULTURE RESULTS: SIGNIFICANT CHANGE UP
CULTURE RESULTS: SIGNIFICANT CHANGE UP
SPECIMEN SOURCE: SIGNIFICANT CHANGE UP
SPECIMEN SOURCE: SIGNIFICANT CHANGE UP

## 2022-04-13 NOTE — ED ADULT NURSE NOTE - HOW OFTEN DO YOU HAVE A DRINK CONTAINING ALCOHOL?
Never Siliq Counseling:  I discussed with the patient the risks of Siliq including but not limited to new or worsening depression, suicidal thoughts and behavior, immunosuppression, malignancy, posterior leukoencephalopathy syndrome, and serious infections.  The patient understands that monitoring is required including a PPD at baseline and must alert us or the primary physician if symptoms of infection or other concerning signs are noted. There is also a special program designed to monitor depression which is required with Siliq.

## 2022-06-29 NOTE — ED ADULT NURSE NOTE - NS ED NOTE ABUSE RESPONSE YN
X Size Of Lesion In Cm: 0 Validate Anticipated Plan: No Anesthesia Type: 1% lidocaine with epinephrine Information: Selecting Yes will display possible errors in your note based on the variables you have selected. This validation is only offered as a suggestion for you. PLEASE NOTE THAT THE VALIDATION TEXT WILL BE REMOVED WHEN YOU FINALIZE YOUR NOTE. IF YOU WANT TO FAX A PRELIMINARY NOTE YOU WILL NEED TO TOGGLE THIS TO 'NO' IF YOU DO NOT WANT IT IN YOUR FAXED NOTE. Yes Notification Instructions: Patient will be notified of biopsy results. However, patient instructed to call the office if not contacted within 2 weeks. Depth Of Biopsy: dermis Biopsy Type: H and E Dressing: bandage Biopsy Method: Dermablade Type Of Destruction Used: Curettage Consent: Written consent was obtained and risks were reviewed including but not limited to scarring, infection, bleeding, scabbing, incomplete removal, nerve damage and allergy to anesthesia. Curettage Text: The wound bed was treated with curettage after the biopsy was performed. Hemostasis: Electrocautery Was A Bandage Applied: Yes Cryotherapy Text: The wound bed was treated with cryotherapy after the biopsy was performed. Electrodesiccation And Curettage Text: The wound bed was treated with electrodesiccation and curettage after the biopsy was performed. Anesthesia Volume In Cc (Will Not Render If 0): 0.5 Silver Nitrate Text: The wound bed was treated with silver nitrate after the biopsy was performed. Billing Type: Third-Party Bill Detail Level: Detailed Electrodesiccation Text: The wound bed was treated with electrodesiccation after the biopsy was performed. Wound Care: Petrolatum Post-Care Instructions: I reviewed with the patient in detail post-care instructions. Patient is to keep the biopsy site dry overnight, and then apply bacitracin twice daily until healed. Patient may apply hydrogen peroxide soaks to remove any crusting.

## 2023-02-26 NOTE — CONSULT NOTE ADULT - PROBLEM SELECTOR PROBLEM 3
Anesthesia Pre Eval Note    Anesthesia ROS/Med Hx          Neuro/Psych Review:  Patient does not have a neuro/psych history       Cardiovascular Review:    Positive for hyperlipidemia    GI/HEPATIC/RENAL Review:  Patient does not have a GI/hepatic/renalhistory       End/Other Review:    Positive for cancer      Relevant Problems   No relevant active problems       Physical Exam     Airway   Mallampati: II  TM Distance: >3 FB  Neck ROM: Full    Cardiovascular  Cardiovascular exam normal    Pulmonary Exam  Pulmonary exam normal    Abdominal Exam  Abdominal exam normal      Anesthesia Plan:    ASA Status: 3  Anesthesia Type: MAC    Consent/Risks Discussed Statement:  The proposed anesthetic plan, including its risks and benefits, have been discussed with the Patient along with the risks and benefits of alternatives. Questions were encouraged and answered and the patient and/or representative understands and agrees to proceed.        I discussed with the patient (and/or patient's legal representative) the risks and benefits of the proposed anesthesia plan, MAC, which may include services performed by other anesthesia providers.    Alternative anesthesia plans, if available, were reviewed with the patient (and/or patient's legal representative). Discussion has been held with the patient (and/or patient's legal representative) regarding risks of anesthesia, which include emergent situations that may require change in anesthesia plan.  The patient (and/or patient's legal representative) has indicated understanding, his/her questions have been answered, and he/she wishes to proceed with the planned anesthetic.      
Other hyperlipidemia

## 2023-05-08 NOTE — PROGRESS NOTE ADULT - SUBJECTIVE AND OBJECTIVE BOX
[FreeTextEntry1] :   may continue to work   light duty given the osteochondral injury I do not believe yet he is ready to return full we may need to consider cortisone injection he has a moderate partial temporary disability  I will see him in a few months  please request   more  the physical therapy  PROGRESS NOTE:     Patient is a 70y old  Female who presents with a chief complaint of Chest Pain (21 Oct 2020 10:07)      SUBJECTIVE / OVERNIGHT EVENTS: No new complaints.     ADDITIONAL REVIEW OF SYSTEMS:    MEDICATIONS  (STANDING):  amLODIPine   Tablet 10 milliGRAM(s) Oral daily  aspirin enteric coated 81 milliGRAM(s) Oral daily  atorvastatin 20 milliGRAM(s) Oral at bedtime  cyanocobalamin 1000 MICROGram(s) Oral daily  dextrose 5%. 1000 milliLiter(s) (50 mL/Hr) IV Continuous <Continuous>  dextrose 50% Injectable 12.5 Gram(s) IV Push once  dextrose 50% Injectable 25 Gram(s) IV Push once  dextrose 50% Injectable 25 Gram(s) IV Push once  enoxaparin Injectable 40 milliGRAM(s) SubCutaneous daily  insulin glargine Injectable (LANTUS) 24 Unit(s) SubCutaneous <User Schedule>  insulin lispro (HumaLOG) corrective regimen sliding scale   SubCutaneous three times a day before meals  insulin lispro (HumaLOG) corrective regimen sliding scale   SubCutaneous at bedtime  insulin lispro Injectable (HumaLOG) 10 Unit(s) SubCutaneous three times a day before meals  metoprolol tartrate 50 milliGRAM(s) Oral three times a day  pantoprazole    Tablet 40 milliGRAM(s) Oral before breakfast    MEDICATIONS  (PRN):  acetaminophen   Tablet .. 650 milliGRAM(s) Oral every 6 hours PRN Temp greater or equal to 38C (100.4F), Mild Pain (1 - 3), Moderate Pain (4 - 6)  dextrose 40% Gel 15 Gram(s) Oral once PRN Blood Glucose LESS THAN 70 milliGRAM(s)/deciliter  glucagon  Injectable 1 milliGRAM(s) IntraMuscular once PRN Glucose LESS THAN 70 milligrams/deciliter  simethicone 80 milliGRAM(s) Chew every 6 hours PRN Indigestion      CAPILLARY BLOOD GLUCOSE      POCT Blood Glucose.: 129 mg/dL (21 Oct 2020 12:30)  POCT Blood Glucose.: 200 mg/dL (21 Oct 2020 10:05)  POCT Blood Glucose.: 96 mg/dL (21 Oct 2020 08:19)  POCT Blood Glucose.: 217 mg/dL (20 Oct 2020 23:54)  POCT Blood Glucose.: 445 mg/dL (20 Oct 2020 22:26)  POCT Blood Glucose.: 96 mg/dL (20 Oct 2020 17:42)  POCT Blood Glucose.: 114 mg/dL (20 Oct 2020 13:45)    I&O's Summary      PHYSICAL EXAM:  Vital Signs Last 24 Hrs  T(C): 36.5 (21 Oct 2020 12:43), Max: 37.1 (20 Oct 2020 14:57)  T(F): 97.7 (21 Oct 2020 12:43), Max: 98.7 (20 Oct 2020 14:57)  HR: 73 (21 Oct 2020 12:43) (65 - 80)  BP: 129/61 (21 Oct 2020 12:43) (120/58 - 149/74)  BP(mean): --  RR: 19 (21 Oct 2020 12:43) (17 - 24)  SpO2: 99% (21 Oct 2020 12:43) (94% - 100%)    CONSTITUTIONAL: NAD, well-developed, well-groomed  EYES: Conjunctiva and sclera clear  RESPIRATORY: Normal respiratory effort; lungs are clear to auscultation bilaterally  CARDIOVASCULAR: Regular rate and rhythm, normal S1 and S2, no murmur/rub/gallop; no LE edema  ABDOMEN: Nontender to palpation, normoactive bowel sounds, no rebound/guarding  MUSCULOSKELETAL: No clubbing or cyanosis of digits  PSYCH: A+O to person, place, and time; affect appropriate  NEUROLOGY: No focal deficits  SKIN: No rashes; no palpable lesions    LABS:                        11.9   6.54  )-----------( 252      ( 21 Oct 2020 05:42 )             37.9     10-21    142  |  104  |  8   ----------------------------<  92  4.0   |  28  |  0.58    Ca    9.8      21 Oct 2020 05:42  Phos  4.6     10-21  Mg     1.9     10-21                  RADIOLOGY & ADDITIONAL TESTS:  Results Reviewed:   Imaging Personally Reviewed:  Electrocardiogram Personally Reviewed:    COORDINATION OF CARE:  Care Discussed with Consultants/Other Providers [Y/N]:  Prior or Outpatient Records Reviewed [Y/N]:

## 2023-08-10 NOTE — ED ADULT NURSE NOTE - AS TEMP SITE
oral Hatchet Flap Text: The defect edges were debeveled with a #15 scalpel blade.  Given the location of the defect, shape of the defect and the proximity to free margins a hatchet flap was deemed most appropriate.  Using a sterile surgical marker, an appropriate hatchet flap was drawn incorporating the defect and placing the expected incisions within the relaxed skin tension lines where possible.    The area thus outlined was incised deep to adipose tissue with a #15 scalpel blade.  The skin margins were undermined to an appropriate distance in all directions utilizing iris scissors.

## 2023-10-26 NOTE — CONSULT NOTE ADULT - PROBLEM SELECTOR RECOMMENDATION 3
Subjective:     Tiffany Carpenter is a 14 y.o. male who is brought in for this well child visit. History provided by: mother    Current Issues:  Current concerns: Amber Zamora has 2 areas of raised, reddened areas right upper abdomen and left lower chest wall concerning for ringworm. Mom has been putting an antifungal cream to areas with some improvement over the last 2 days    Well Child Assessment:  History was provided by the mother. Amber Zamora lives with his mother, father and sister. Nutrition  Types of intake include cereals, cow's milk, eggs, fruits, vegetables and meats (primarily drinks milk and water). Dental  The patient has a dental home. The patient brushes teeth regularly. The patient does not floss regularly. Last dental exam was 6-12 months ago. Elimination  Elimination problems do not include diarrhea. (no issue)   Behavioral  Disciplinary methods include consistency among caregivers. Sleep  Average sleep duration is 9 hours. The patient does not snore. There are no sleep problems. Safety  There is no smoking in the home. Home has working smoke alarms? yes. Home has working carbon monoxide alarms? yes. There is no gun in home. School  Current grade level is 6th. Current school district is [employer]. There are no signs of learning disabilities. Child is doing well in school. Screening  There are no risk factors for hearing loss. There are no risk factors for anemia. There are no risk factors for dyslipidemia. There are no risk factors for tuberculosis. There are no risk factors for vision problems. There are no risk factors related to diet. There are no risk factors at school. There are no risk factors for sexually transmitted infections. There are no risk factors related to alcohol. There are no risk factors related to relationships. There are no risk factors related to friends or family. There are no risk factors related to emotions. There are no risk factors related to drugs.  There are no risk factors related to personal safety. There are no risk factors related to tobacco. There are no risk factors related to special circumstances. Social  The caregiver enjoys the child. After school, the child is at home with a parent. Sibling interactions are good. The child spends 90 minutes in front of a screen (tv or computer) per day. The following portions of the patient's history were reviewed and updated as appropriate: allergies, current medications, past family history, past medical history, past social history, past surgical history, and problem list.          Objective:       Vitals:    10/26/23 1458   BP: (!) 102/60   BP Location: Left arm   Patient Position: Sitting   Pulse: 66   Resp: 18   SpO2: 98%   Weight: 55.2 kg (121 lb 9.6 oz)   Height: 5' 1.02" (1.55 m)     Growth parameters are noted and are appropriate for age. Wt Readings from Last 1 Encounters:   10/26/23 55.2 kg (121 lb 9.6 oz) (92 %, Z= 1.38)*     * Growth percentiles are based on CDC (Boys, 2-20 Years) data. Ht Readings from Last 1 Encounters:   10/26/23 5' 1.02" (1.55 m) (78 %, Z= 0.78)*     * Growth percentiles are based on CDC (Boys, 2-20 Years) data. Body mass index is 22.96 kg/m². Vitals:    10/26/23 1458   BP: (!) 102/60   BP Location: Left arm   Patient Position: Sitting   Pulse: 66   Resp: 18   SpO2: 98%   Weight: 55.2 kg (121 lb 9.6 oz)   Height: 5' 1.02" (1.55 m)       No results found. Physical Exam  Vitals and nursing note reviewed. Exam conducted with a chaperone present. Constitutional:       General: He is active. He is not in acute distress. Appearance: Normal appearance. He is well-developed. HENT:      Head: Normocephalic and atraumatic. Right Ear: Tympanic membrane, ear canal and external ear normal.      Left Ear: Tympanic membrane, ear canal and external ear normal.      Nose: Nose normal. No congestion or rhinorrhea.       Mouth/Throat:      Mouth: Mucous membranes are moist. Pharynx: Oropharynx is clear. Eyes:      General: Visual tracking is normal.      Extraocular Movements: Extraocular movements intact. Conjunctiva/sclera: Conjunctivae normal.      Pupils: Pupils are equal, round, and reactive to light. Funduscopic exam:     Right eye: No papilledema. Left eye: No papilledema. Cardiovascular:      Rate and Rhythm: Normal rate and regular rhythm. Pulses: Normal pulses. Pulses are strong. Heart sounds: Normal heart sounds. No murmur heard. Pulmonary:      Effort: Pulmonary effort is normal.      Breath sounds: Normal breath sounds and air entry. No wheezing, rhonchi or rales. Abdominal:      General: Abdomen is flat. Bowel sounds are normal. There is no distension. Palpations: Abdomen is soft. Tenderness: There is no abdominal tenderness. Genitourinary:     Testes: Cremasteric reflex is present. Musculoskeletal:         General: No deformity. Normal range of motion. Cervical back: Normal range of motion and neck supple. Lymphadenopathy:      Cervical: No cervical adenopathy. Skin:     General: Skin is warm and dry. Capillary Refill: Capillary refill takes less than 2 seconds. Coloration: Skin is not jaundiced. Findings: No rash. Neurological:      General: No focal deficit present. Mental Status: He is alert and oriented for age. Deep Tendon Reflexes: Reflexes are normal and symmetric. Psychiatric:         Mood and Affect: Mood normal.         Behavior: Behavior normal.         Thought Content: Thought content normal.         Judgment: Judgment normal.       Review of Systems   Constitutional:  Negative for activity change, chills and fever. HENT:  Negative for ear pain and sore throat. Eyes:  Positive for visual disturbance. Negative for pain. Needs eye exam   Respiratory:  Negative for snoring, cough and shortness of breath. Cardiovascular:  Negative for chest pain and palpitations. Gastrointestinal:  Negative for abdominal pain, diarrhea, nausea and vomiting. Genitourinary:  Negative for dysuria and hematuria. Musculoskeletal:  Negative for back pain and gait problem. Skin:  Negative for color change and rash. Neurological:  Negative for seizures and syncope. Psychiatric/Behavioral: Negative. Negative for sleep disturbance. All other systems reviewed and are negative. Assessment:     Well adolescent. 1. Encounter for immunization  -     TDAP VACCINE GREATER THAN OR EQUAL TO 8YO IM  -     MENINGOCOCCAL ACYW-135 TT CONJUGATE  -     HPV VACCINE 9 VALENT IM  -     influenza vaccine, quadrivalent, 0.5 mL, preservative-free, for adult and pediatric patients 6 mos+ ([place], [address] [facility], [address] [facility], [place])        Plan:         1. Anticipatory guidance discussed. Specific topics reviewed: bicycle helmets, importance of regular dental care, importance of regular exercise, importance of varied diet, and seat belts. Nutrition and Exercise Counseling: The patient's Body mass index is 22.96 kg/m². This is 93 %ile (Z= 1.44) based on CDC (Boys, 2-20 Years) BMI-for-age based on BMI available as of 10/26/2023. Nutrition counseling provided:  Avoid juice/sugary drinks. Anticipatory guidance for nutrition given and counseled on healthy eating habits. 5 servings of fruits/vegetables. Exercise counseling provided:  Anticipatory guidance and counseling on exercise and physical activity given. Reduce screen time to less than 2 hours per day. 1 hour of aerobic exercise daily. 2. Development: appropriate for age    1. Immunizations today: per orders. Vaccine Counseling: Discussed with: Ped parent/guardian: mother. The benefits, contraindication and side effects for the following vaccines were reviewed: Immunization component list: Tetanus, pertussis, Meningococcal, and influenza. 4. Follow-up visit in 1 year for next well child visit, or sooner as needed. -Agree with statin

## 2023-12-04 NOTE — PATIENT PROFILE ADULT - NSPROPTRIGHTBILLOFRIGHTS_GEN_A_NUR
Detail Level: Detailed Size Of Lesion In Cm (Optional): 0.7 X Size Of Lesion In Cm (Optional): 0.5 patient

## 2024-03-12 NOTE — H&P ADULT - PROBLEM/PLAN-5
[NS_DeliveryAttending1_OBGYN_ALL_OB_FT:[id number]],[NS_DeliveryAssist1_OBGYN_ALL_OB_FT:[id number]],[NS_DeliveryRN_OBGYN_ALL_OB_FT:[id number]]
DISPLAY PLAN FREE TEXT

## 2024-04-23 NOTE — PATIENT PROFILE ADULT - PATIENT/CAREGIVER OFFERED  INTERPRETER SERVICES
yes Quality 47: Advance Care Plan: Advance care planning not documented, reason not otherwise specified. Detail Level: Detailed Quality 226: Preventive Care And Screening: Tobacco Use: Screening And Cessation Intervention: Patient screened for tobacco use and is an ex/non-smoker

## 2024-10-02 NOTE — ED PROVIDER NOTE - CADM POA PRESS ULCER
Patient came in for HD flu vaccine and Covid vaccine. Allergies reviewed. HD flu vaccine administered on left deltoid and Covid vaccine was administered on right deltoid. No adverse reactions noted.    No
